# Patient Record
Sex: FEMALE | Race: WHITE | NOT HISPANIC OR LATINO | Employment: FULL TIME | URBAN - METROPOLITAN AREA
[De-identification: names, ages, dates, MRNs, and addresses within clinical notes are randomized per-mention and may not be internally consistent; named-entity substitution may affect disease eponyms.]

---

## 2017-01-05 ENCOUNTER — ALLSCRIPTS OFFICE VISIT (OUTPATIENT)
Dept: OTHER | Facility: OTHER | Age: 22
End: 2017-01-05

## 2017-01-06 ENCOUNTER — TRANSCRIBE ORDERS (OUTPATIENT)
Dept: ADMINISTRATIVE | Age: 22
End: 2017-01-06

## 2017-01-06 ENCOUNTER — APPOINTMENT (OUTPATIENT)
Dept: LAB | Age: 22
End: 2017-01-06
Attending: PREVENTIVE MEDICINE

## 2017-01-06 DIAGNOSIS — Z02.1 PRE-EMPLOYMENT DRUG SCREENING: Primary | ICD-10-CM

## 2017-01-06 DIAGNOSIS — Z02.1 PRE-EMPLOYMENT DRUG SCREENING: ICD-10-CM

## 2017-01-06 PROCEDURE — 36415 COLL VENOUS BLD VENIPUNCTURE: CPT

## 2017-01-06 PROCEDURE — 86803 HEPATITIS C AB TEST: CPT

## 2017-01-06 PROCEDURE — 86706 HEP B SURFACE ANTIBODY: CPT

## 2017-01-08 LAB
HBV SURFACE AB SER-ACNC: <3.1 MIU/ML
HCV AB SER QL: NORMAL

## 2017-01-12 ENCOUNTER — ALLSCRIPTS OFFICE VISIT (OUTPATIENT)
Dept: OTHER | Facility: OTHER | Age: 22
End: 2017-01-12

## 2017-02-02 ENCOUNTER — ALLSCRIPTS OFFICE VISIT (OUTPATIENT)
Dept: OTHER | Facility: OTHER | Age: 22
End: 2017-02-02

## 2017-02-02 DIAGNOSIS — N93.9 ABNORMAL UTERINE AND VAGINAL BLEEDING, UNSPECIFIED: ICD-10-CM

## 2017-02-02 LAB — HCG, QUALITATIVE (HISTORICAL): NEGATIVE

## 2017-03-21 ENCOUNTER — ALLSCRIPTS OFFICE VISIT (OUTPATIENT)
Dept: OTHER | Facility: OTHER | Age: 22
End: 2017-03-21

## 2017-04-03 ENCOUNTER — ALLSCRIPTS OFFICE VISIT (OUTPATIENT)
Dept: OTHER | Facility: OTHER | Age: 22
End: 2017-04-03

## 2017-04-03 DIAGNOSIS — N92.6 IRREGULAR MENSTRUATION: ICD-10-CM

## 2017-04-03 PROCEDURE — 87591 N.GONORRHOEAE DNA AMP PROB: CPT | Performed by: PHYSICIAN ASSISTANT

## 2017-04-03 PROCEDURE — 87491 CHLMYD TRACH DNA AMP PROBE: CPT | Performed by: PHYSICIAN ASSISTANT

## 2017-04-04 ENCOUNTER — LAB REQUISITION (OUTPATIENT)
Dept: LAB | Facility: HOSPITAL | Age: 22
End: 2017-04-04
Payer: COMMERCIAL

## 2017-04-04 DIAGNOSIS — Z11.3 ENCOUNTER FOR SCREENING FOR INFECTIONS WITH PREDOMINANTLY SEXUAL MODE OF TRANSMISSION: ICD-10-CM

## 2017-04-05 LAB
CHLAMYDIA DNA CVX QL NAA+PROBE: NORMAL
N GONORRHOEA DNA GENITAL QL NAA+PROBE: NORMAL

## 2017-07-31 ENCOUNTER — ALLSCRIPTS OFFICE VISIT (OUTPATIENT)
Dept: OTHER | Facility: OTHER | Age: 22
End: 2017-07-31

## 2017-07-31 DIAGNOSIS — N92.6 IRREGULAR MENSTRUATION: ICD-10-CM

## 2017-10-30 ENCOUNTER — ALLSCRIPTS OFFICE VISIT (OUTPATIENT)
Dept: OTHER | Facility: OTHER | Age: 22
End: 2017-10-30

## 2017-11-13 ENCOUNTER — ALLSCRIPTS OFFICE VISIT (OUTPATIENT)
Dept: OTHER | Facility: OTHER | Age: 22
End: 2017-11-13

## 2017-12-26 ENCOUNTER — GENERIC CONVERSION - ENCOUNTER (OUTPATIENT)
Dept: OTHER | Facility: OTHER | Age: 22
End: 2017-12-26

## 2017-12-27 ENCOUNTER — TRANSCRIBE ORDERS (OUTPATIENT)
Dept: LAB | Facility: CLINIC | Age: 22
End: 2017-12-27

## 2017-12-27 ENCOUNTER — ALLSCRIPTS OFFICE VISIT (OUTPATIENT)
Dept: OTHER | Facility: OTHER | Age: 22
End: 2017-12-27

## 2017-12-27 ENCOUNTER — APPOINTMENT (OUTPATIENT)
Dept: LAB | Facility: CLINIC | Age: 22
End: 2017-12-27
Payer: COMMERCIAL

## 2017-12-27 DIAGNOSIS — Z00.00 ROUTINE GENERAL MEDICAL EXAMINATION AT A HEALTH CARE FACILITY: ICD-10-CM

## 2017-12-27 DIAGNOSIS — F41.8 DEPRESSION WITH ANXIETY: Primary | ICD-10-CM

## 2017-12-27 PROCEDURE — 36415 COLL VENOUS BLD VENIPUNCTURE: CPT

## 2017-12-27 PROCEDURE — 86480 TB TEST CELL IMMUN MEASURE: CPT

## 2017-12-29 LAB
ANNOTATION COMMENT IMP: NORMAL
GAMMA INTERFERON BACKGROUND BLD IA-ACNC: 0.08 IU/ML
M TB IFN-G BLD-IMP: NEGATIVE
M TB IFN-G CD4+ BCKGRND COR BLD-ACNC: 0.01 IU/ML
M TB IFN-G CD4+ T-CELLS BLD-ACNC: 0.09 IU/ML
MITOGEN IGNF BLD-ACNC: 8.82 IU/ML
QUANTIFERON-TB GOLD IN TUBE: NORMAL
SERVICE CMNT-IMP: NORMAL

## 2018-01-12 VITALS
WEIGHT: 142 LBS | HEART RATE: 64 BPM | RESPIRATION RATE: 16 BRPM | HEIGHT: 65 IN | SYSTOLIC BLOOD PRESSURE: 112 MMHG | BODY MASS INDEX: 23.66 KG/M2 | DIASTOLIC BLOOD PRESSURE: 80 MMHG

## 2018-01-12 NOTE — PROGRESS NOTES
Assessment    1  Encounter for gynecological examination without abnormal finding (V72 31) (Z01 419)    Plan  Encounter for gynecological examination without abnormal finding    · Follow-up visit in 1 year Evaluation and Treatment  Follow-up  Status: Complete  Done:  30Oct2017   Ordered; For: Encounter for gynecological examination without abnormal finding; Ordered By: Rachael Núñez Performed:  Due: 79KVI0982; Last Updated By: Josh Abraham; 10/30/2017 1:39:35 PM  Irregular bleeding    · Lo Loestrin Fe 1 MG-10 MCG / 10 MCG Oral Tablet; One po daily   Rx By: Rachael Núñez; Dispense: 90 Days ; #:90 Tablet; Refill: 3; For: Irregular bleeding; DREW = N; Verified Transmission to [facility]/PHARMACY# [id number]; Last Updated By: System, [employer]; 10/30/2017 1:33:21 PM    Discussion/Summary  healthy adult female the risks and benefits of cervical cancer screening were discussed cervical cancer screening is not indicated Breast cancer screening: the risks and benefits of breast cancer screening were discussed and monthly self breast exam was advised  Advice and education were given regarding weight bearing exercise, calcium supplements and vitamin D supplements  Chief Complaint  Pt is here for her yearly exam/ pill check, she has no complaints  History of Present Illness  HPI: 23year old white female here for yearly exam and pill check  Back in July she was seen for irregular bleeding; her Nexplanon was removed and she was started on Lo Loestrin  She has had very regular, light bleeding since that time  She is having some difficulty remembering her pills on weekends and she is going to work on this  If she is still not doing well, we did discuss the options of NuvaRing or OrthoEvra patches  GYN HM, Adult Female [facility]: The patient is being seen for a health maintenance evaluation  The last health maintenance visit was 1 year(s) ago  General Health: The patient's health since the last visit is described as good  Lifestyle:  She consumes a diverse and healthy diet  She does not have any weight concerns  She exercises regularly  She exercises less than three times a week  She does not use tobacco  She consumes alcohol  She reports occasional alcohol use  She denies drug use  Reproductive health: the patient is premenopausal   she reports no menstrual problems  she uses contraception  For contraception, she uses oral contraception pills  she is sexually active  she denies prior pregnancies  Screening: Cervical cancer screening includes no previous pap smear and no previous human papilloma virus screening  Breast cancer screening includes no previous mammogram  Colorectal cancer screening includes no previous colonoscopy  Active Problems    1  Depression with anxiety (300 4) (F41 8)   2  Insomnia (780 52) (G47 00)   3   Irregular bleeding (626 4) (N92 6)    Past Medical History    · History of Allergic rhinitis due to pollen (477 0) (J30 1)   · History of Birth control counseling (V25 09) (Z30 09)   · History of Contraception management (V25 9) (Z30 9)   · History of Depression (311) (F32 9)   · History of Evaluation for contraceptive implant (V25 02) (Z30 017)   · History of Globus sensation (306 4) (F45 8)   · History of acute pharyngitis (V12 69) (Z87 09)   · History of allergic reaction (V15 09) (Z88 9)   · History of allergy (V15 09) (Z88 9)   · History of dysmenorrhea (V13 29) (Z87 42)   · History of epistaxis (V12 69) (Z29 341)   · History of influenza (V12 09) (Z87 09)   · History of low back pain (V13 59) (Z87 39)   · History of Insertion of Nexplanon (V25 5) (Z30 017)   · History of No Recent Change In Medical History   · History of Right wrist sprain (842 00) (S63 501A)   · History of Stretch marks (701 3) (L90 6)   · History of Weight gain (783 1) (R63 5)    Surgical History    · Denied: History Of Prior Surgery    Family History  Mother    · Family history of Anemia (V18 2)   · Family history of Idiopathic Thrombocytopenic Purpura  Father    · Family history of Alcohol Abuse  Maternal Grandfather    · Family history of Esophageal Cancer (V16 0)  Paternal Grandfather    · Family history of Multiple Sclerosis    Social History    · Caffeine Use   · Daily Coffee Consumption (2  Cups/Day)   · Never A Smoker   · Never Drank Alcohol   · Never Used Drugs    Current Meds   1  Claritin 10 MG Oral Capsule; Take one tablet daily; Therapy: 49SSY3120 to (Evaluate:31Fey7811)  Requested for: 28Apr2017; Last   Rx:28Apr2017 Ordered   2  Lo Loestrin Fe 1 MG-10 MCG / 10 MCG Oral Tablet; One po daily; Therapy: 56MCR9279 to (Gabby Million)  Requested for: 23Oct2017; Last   Rx:23Oct2017 Ordered    Allergies    1  No Known Drug Allergies    2  Pollen    Vitals   Recorded: 51HRO2263 02:46YG   Systolic 114, LUE, Sitting   Diastolic 82, LUE, Sitting   Height 5 ft 5 in   Weight 146 lb    BMI Calculated 24 3   BSA Calculated 1 73   LMP 13Oct2017     Physical Exam    Constitutional   General appearance: No acute distress, well appearing and well nourished  Neck   Neck: Normal, supple, trachea midline, no masses  Genitourinary   External genitalia: Normal and no lesions appreciated  Vagina: Normal, no lesions or dryness appreciated  Urethra: Normal     Urethral meatus: Normal     Bladder: Normal, soft, non-tender and no prolapse or masses appreciated  Cervix: Normal, no palpable masses  Uterus: Normal, non-tender, not enlarged, and no palpable masses  Adnexa/parametria: Normal, non-tender and no fullness or masses appreciated  Chest   Breasts: Normal and no dimpling or skin changes noted  Abdomen   Abdomen: Normal, non-tender, and no organomegaly noted         Signatures  Electronically signed by : Saurabh Godwin, [facility]; Oct 30 2017  1:31PM EST                       (Author)

## 2018-01-13 VITALS
SYSTOLIC BLOOD PRESSURE: 127 MMHG | BODY MASS INDEX: 26.66 KG/M2 | HEART RATE: 94 BPM | WEIGHT: 160 LBS | HEIGHT: 65 IN | DIASTOLIC BLOOD PRESSURE: 83 MMHG

## 2018-01-13 VITALS
DIASTOLIC BLOOD PRESSURE: 78 MMHG | HEIGHT: 65 IN | WEIGHT: 158 LBS | SYSTOLIC BLOOD PRESSURE: 122 MMHG | BODY MASS INDEX: 26.33 KG/M2

## 2018-01-13 VITALS
HEART RATE: 80 BPM | BODY MASS INDEX: 27.49 KG/M2 | RESPIRATION RATE: 18 BRPM | DIASTOLIC BLOOD PRESSURE: 70 MMHG | HEIGHT: 65 IN | SYSTOLIC BLOOD PRESSURE: 112 MMHG | WEIGHT: 165 LBS

## 2018-01-13 VITALS
WEIGHT: 159.5 LBS | SYSTOLIC BLOOD PRESSURE: 112 MMHG | RESPIRATION RATE: 16 BRPM | HEIGHT: 65 IN | DIASTOLIC BLOOD PRESSURE: 72 MMHG | HEART RATE: 68 BPM | BODY MASS INDEX: 26.57 KG/M2

## 2018-01-13 VITALS — BODY MASS INDEX: 26.09 KG/M2 | WEIGHT: 152 LBS | SYSTOLIC BLOOD PRESSURE: 120 MMHG | DIASTOLIC BLOOD PRESSURE: 78 MMHG

## 2018-01-14 VITALS
WEIGHT: 146 LBS | DIASTOLIC BLOOD PRESSURE: 82 MMHG | SYSTOLIC BLOOD PRESSURE: 140 MMHG | BODY MASS INDEX: 24.32 KG/M2 | HEIGHT: 65 IN

## 2018-01-14 NOTE — PROGRESS NOTES
Assessment    1  Depression with anxiety (300 4) (F41 8)    Plan  Depression with anxiety    · FLUoxetine HCl - 20 MG Oral Capsule (PROzac); TAKE 1 CAPSULE EVERY DAY   · Follow-up visit in 1 month Evaluation and Treatment  Follow-up  Status: Hold For -  Scheduling  Requested for: 51JKO1764    Chief Complaint  Patient here to discuss changing here Lexapro prescription      History of Present Illness  STOPPED LEXAPRO DUE TO FATIGUE   The patient is being seen for a routine clinic follow-up of depression  Symptoms:  depressed mood, crying spells, fatigue and loss of interest in activities, but no irritability, no poor concentration, no poor school performance, no school absenteeism, no guilt, no psychomotor retardation, no agitation, no anger, no appetite change, no weight loss, no insufficient weight gain, no weight gain, no poor sleep, no hypersomnia, no headaches, no parent-child conflict and no sibling conflict  Review of Systems    Constitutional: No complaints of fever or chills, feels well, no tiredness, no recent weight gain or loss  Eyes: No complaints of eye pain, no discharge, no eyesight problems, eyes do not itch, no red or dry eyes  ENT: no complaints of nasal discharge, no hoarseness, no earache, no nosebleeds, no loss of hearing, no sore throat  Cardiovascular: No complaints of chest pain, no palpitations, normal heart rate, no lower extremity edema  Respiratory: No complaints of cough, no shortness of breath, no wheezing, no leg claudication  Gastrointestinal: No complaints of abdominal pain, no nausea or vomiting, no constipation, no diarrhea or bloody stools  Genitourinary: No complaints of incontinence, no pelvic pain, no dysuria or dysmenorrhea, no abnormal vaginal bleeding or vaginal discharge  Musculoskeletal: No complaints of limb swelling or limb pain, no myalgias, no joint swelling or joint stiffness     Integumentary: No complaints of skin rash, no skin lesions or wounds, no itching, no breast pain, no breast lump  Neurological: No complaints of headache, no numbness or tingling, no confusion, no dizziness, no limb weakness, no convulsions or fainting, no difficulty walking  Psychiatric: as noted in HPI  Endocrine: No complaints of feeling weak, no muscle weakness, no deepening of voice, no hot flashes or proptosis  Hematologic/Lymphatic: No complaints of swollen glands, no neck swollen glands, does not bleed or bruise easily  Active Problems    1  Birth control counseling (V25 09) (Z30 9)   2  Contraception management (V25 9) (Z30 9)   3  Depression with anxiety (300 4) (F41 8)   4  Dysmenorrhea (625 3) (N94 6)   5  Encounter for contraceptive surveillance (V25 40) (Z30 40)   6  Encounter for management and injection of depo-Provera (V25 49) (Z30 49)   7  Evaluation for contraceptive implant (V25 02) (Z30 018)   8  History of allergy (V15 09) (Z88 9)   9  Need for HPV vaccination (V04 89) (Z23)   10  Need for influenza vaccination (V04 81) (Z23)   11  Stretch marks (701 3) (L90 6)    Past Medical History    1  History of Depression (311) (F32 9)   2  History of No Recent Change In Medical History    Surgical History    1  Denied: History Of Prior Surgery    Family History    1  Family history of Anemia (V18 2)   2  Family history of Idiopathic Thrombocytopenic Purpura    3  Family history of Alcohol Abuse    4  Family history of Esophageal Cancer (V16 0)    5  Family history of Multiple Sclerosis    Social History    · Caffeine Use   · Daily Coffee Consumption (2  Cups/Day)   · Never A Smoker   · Never Drank Alcohol   · Never Used Drugs    Current Meds   1  Escitalopram Oxalate 10 MG Oral Tablet; TAKE ONE TABLET BY MOUTH EVERY DAY; Therapy: 90GMP3637 to (Evaluate:34Lih7761)  Requested for: 01HMU3443; Last   Rx:16Nov2015 Ordered    Allergies    1  No Known Drug Allergies    2   Pollen    Vitals  Vital Signs [Data Includes: Current Encounter]    Recorded: 49LQU4395 11: 29AM   Heart Rate 80   Respiration 18   Systolic 869   Diastolic 70   Height 5 ft 5 in   2-20 Stature Percentile 61 %   Weight 156 lb    2-20 Weight Percentile 87 %   BMI Calculated 25 96   BMI Percentile 85 %   BSA Calculated 1 78     Physical Exam    Constitutional - General appearance: No acute distress, well appearing and well nourished  Pulmonary - Respiratory effort: Normal respiratory rate and rhythm, no increased work of breathing  Auscultation of lungs: Clear bilaterally  Cardiovascular - Pedal pulses: Normal, 2+ bilaterally  Examination of extremities for edema and/or varicosities: Normal    Abdomen - Abdomen: Normal bowel sounds, soft, non-tender, no masses  Liver and spleen: No hepatomegaly or splenomegaly     Psychiatric - Orientation to person, place, and time: Normal  Mood and affect: Normal       Signatures   Electronically signed by : Brianne Lan DO; Jan 22 2016 11:47AM EST                       (Author)

## 2018-01-19 ENCOUNTER — GENERIC CONVERSION - ENCOUNTER (OUTPATIENT)
Dept: OTHER | Facility: OTHER | Age: 23
End: 2018-01-19

## 2018-01-22 VITALS
HEART RATE: 68 BPM | SYSTOLIC BLOOD PRESSURE: 118 MMHG | RESPIRATION RATE: 16 BRPM | BODY MASS INDEX: 26.37 KG/M2 | HEIGHT: 65 IN | DIASTOLIC BLOOD PRESSURE: 70 MMHG | WEIGHT: 158.25 LBS

## 2018-01-23 VITALS
DIASTOLIC BLOOD PRESSURE: 70 MMHG | BODY MASS INDEX: 23.06 KG/M2 | RESPIRATION RATE: 16 BRPM | SYSTOLIC BLOOD PRESSURE: 100 MMHG | HEIGHT: 65 IN | HEART RATE: 64 BPM | WEIGHT: 138.38 LBS

## 2018-01-23 NOTE — MISCELLANEOUS
Message   Recorded as Task   Date: 12/20/2017 12:57 PM, Created By: Sandy Ravi   Task Name: Call Back   Assigned To: Vanessa Martin   Regarding Patient: Emely Badillo, Status: In Progress   Comment:    Lorena Hoff - 20 Dec 2017 12:57 PM     TASK CREATED  PT CALLED C/O SPOTTING X 1 WK  STARTED AS A NORMAL PERIOD ABOUT A WEEK AFTER HER REGULAR PERIOPD  STILL GOING ON  MISSED 2 PILLS  SHE DID START A NEW MEDICATION- ESCITALOPRAM 10MG 1 QD  NO NEW PARTNERS  [facility] Dec 2017 1:19 PM     TASK IN PROGRESS   Jacquie Thomas - 20 Dec 2017 1:19 PM     TASK EDITED  [facility] for pt to cb       ext: [id number]   Jacquie Thomas - 21 Dec 2017 9:39 AM     TASK EDITED  [facility] for pt to cb     ext: [id number]   Jacquie Thomas - 22 Dec 2017 11:09 AM     TASK EDITED  [facility] for pt to cb       ext: [place] [id number] 26 Dec 2017 7:30 AM     TASK EDITED  [facility] for pt to cb   4th call       ext: [phone]   Jacquie Thomas - 26 Dec 2017 9:51 AM     TASK EDITED  Patient returned call - she was on the Nexplanon for about a year - switched out to pill form - is now on the Lo Loestrin - states she has been on this for 6 months - currently in 6th pack  She had some breakthrough bleeding when she first started using it, but got regulated  This current pack - she had break through bleeding in 2nd week of pack - was spotting  No pain  She is due for her period next week  Did miss 2 pills when starting this current pack - just doubled up on it - has not missed any since  She has recently been put on a new medication Escitalopram 10mg  Patient wants to know if this could be the cause of the irregular bleeding  Please advise  Thanks! Bebe Cavazos - 26 Dec 2017 10:07 AM     TASK REPLIED TO: Previously Assigned To Bebe Cavazos  Escitalopram should not be causing irregular bleeding    If she missed two pills/took them late this pack, this would explain her irregular bleeding   I would observe next month and call if it is persistent    If she is having trouble remembering pills maybe should consider switch to patches/ring   Jacquie Thomas - 26 Dec 2017 10:07 AM     TASK IN PROGRESS   Jacquie Thomas - 26 Dec 2017 10:09 AM     TASK EDITED  Called pt back - explained to her that the Escitalopram should not counteract the OCP per W87  Advised her per W87 to observe next month - if it happens again to call back  Active Problems    1  Depression with anxiety (300 4) (F41 8)   2  Encounter for gynecological examination without abnormal finding (V72 31) (Z01 419)   3  Insomnia (780 52) (G47 00)   4  Irregular bleeding (626 4) (N92 6)    Current Meds   1  Claritin 10 MG Oral Capsule; Take one tablet daily; Therapy: 61MQD4620 to (Evaluate:72Uxx2042)  Requested for: 28Apr2017; Last   Rx:28Apr2017 Ordered   2  Escitalopram Oxalate 10 MG Oral Tablet; TAKE 1 TABLET DAILY; Therapy: 20OFO1976 to (Evaluate:39Lam7928)  Requested for: 20PWY1397; Last   Rx:13Nov2017 Ordered   3  Lo Loestrin Fe 1 MG-10 MCG / 10 MCG Oral Tablet; One po daily; Therapy: 71AOR4133 to ([phone])  Requested for: 29VZH3556; Last   Rx:94Ibd7019 Ordered    Allergies    1  No Known Drug Allergies    2   Pollen    Signatures   Electronically signed by : Lilian Glez, ; Dec 26 2017 10:10AM EST                       (Author)

## 2018-01-23 NOTE — RESULT NOTES
Verified Results  (1) QUANTIFERON - TB GOLD 25Ssu1980 11:22AM [address], [address]     Test Name Result Flag Reference   QUANTIFERON TB GOLD      Specimen incubated at [place], [place]    Performed at:  [address] [place] [address]  [id number]  : Dank Gonzales MD, Phone:  [phone]   QUANTIFERON TB GOLD Negative  Negative   QUANTIFERON CRITERIA Comment     To be considered positive a specimen should have a TB Ag minus Nil  value greater than or equal to 0 35 IU/mL and in addition the TB Ag  minus Nil value must be greater than or equal to 25% of the Nil  value  There may be insufficient information in these values to  differentiate between some negative and some indeterminate test  values  QUANTIFERON TB AG VALUE 0 09 IU/mL     QUANTIFERON NIL VALUE 0 08 IU/mL     QUANTIFERON MITOGEN VALUE 8 82 IU/mL     QFT TB AG MINUS NIL VALUE 0 01 IU/mL     QFT INTERPRETATION Comment     The QuantiFERON TB Gold (in Tube) assay is intended for use as an aid  in the diagnosis of TB infection  Negative results suggest that there  is no TB infection  In patients with high suspicion of exposure, a  negative test should be repeated  A positive test indicates infection  with Mycobacterium tuberculosis  Among individuals without  tuberculosis infection, a positive test may be due to exposure to  [place], MICHELE boggs or M  marieugenioum  On the Internet, go to  cdc gov/tb for further details    Performed at:  [employer]  [id number]  : Dank Gonzales MD, Phone:  [phone]

## 2018-01-23 NOTE — PROGRESS NOTES
Assessment   1  Encounter for preventive health examination (V70 0) (Z00 00)  2  Depression with anxiety (300 4) (F41 8)    Plan  Depression with anxiety, Health Maintenance    · (1) QUANTIFERON - TB GOLD; Status:Active; Requested for:59Flt6637;    · Follow-up visit in 3 months Evaluation and Treatment  Follow-up  Status: Complete   Done: 71VVK1216  Need for influenza vaccination    · Administered: Fluzone Quadrivalent 0 5 ML Intramuscular Suspension Prefilled Syringe    Discussion/Summary  health maintenance visit healthy adult female Currently, she eats a healthy diet and has an adequate exercise regimen  the risks and benefits of cervical cancer screening were discussed next cervical cancer screening is due Next year  cervical cancer screening is managed by OB/GYN  Breast cancer screening: the risks and benefits of breast cancer screening were discussed, monthly self breast exam was advised and breast cancer screening is managed by OB/GYN  Colorectal cancer screening: colorectal cancer screening is not indicated  Osteoporosis screening: bone mineral density testing is not indicated  Screening lab work includes [facility]  The risks and benefits of immunizations were discussed ,  immunizations are needed2  and Flu Vaccine and Adacel 2  given IM today, and tolerated well  2   Advice and education were given regarding nutrition, aerobic exercise and  Mood is well controlled on present management at this time  1   Patient discussion: discussed with the patient, [place] is stable on exam  She is to f/u in 3 months, or sooner PRN  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  The patient was counseled regarding instructions for management, impressions, importance of compliance with treatment  The treatment plan was reviewed with the patient/guardian  The patient/guardian understands and agrees with the treatment plan       1 Amended By: Liliana Jalloh;  Dec 28 2017 8:02 AM EST   2 Amended By: Gayle Rudolph; Dec 28 2017 8:03 AM EST    Chief Complaint  PT is being seen today for a HM visit  History of Present Illness  HM, Adult Female: The patient is being seen for a health maintenance evaluation  The last health maintenance visit was 1 year(s) ago  General Health: The patient's health since the last visit is described as good   We discussed - PAP smear due next year  She has regular dental visits  She denies vision problems  She denies hearing loss  Immunizations status: not up to date   Adacel  Lifestyle:  She consumes a diverse and healthy diet  She does not have any weight concerns  She exercises regularly  She does not use tobacco  She denies alcohol use  She denies drug use  Reproductive health:  she reports normal menses  she is sexually active  Screening: cancer screening reviewed and current  metabolic screening reviewed and current  risk screening reviewed and current  Additional History:  No CP/SOB  No abd pain , No new breast lumps  Menses regular  Mood well controlled on present management  No HI/SI  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  HPI: As above  Review of Systems    Constitutional: as noted in HPI  Eyes: as noted in HPI    ENT: as noted in HPI  Cardiovascular: as noted in HPI  Respiratory: as noted in HPI  Gastrointestinal: as noted in HPI  Genitourinary: as noted in HPI  Psychiatric: as noted in HPI  Active Problems   1  Depression with anxiety (300 4) (F41 8)  2  Encounter for gynecological examination without abnormal finding (V72 31) (Z01 419)  3  Insomnia (780 52) (G47 00)  4  Irregular bleeding (626 4) (N92 6)  5   Need for influenza vaccination (V04 81) (Z23)    Past Medical History    · History of Allergic rhinitis due to pollen (477 0) (J30 1)   · History of Birth control counseling (V25 09) (Z30 09)   · History of Contraception management (V25 9) (Z30 9)   · History of Depression (311) (F32 9)   · History of Evaluation for contraceptive implant (V25 02) (Z30 017)   · History of Globus sensation (306 4) (F45 8)   · History of acute pharyngitis (V12 69) (Z87 09)   · History of allergic reaction (V15 09) (Z88 9)   · History of allergy (V15 09) (Z88 9)   · History of dysmenorrhea (V13 29) (Z87 42)   · History of epistaxis (V12 69) (J25 971)   · History of influenza (V12 09) (Z87 09)   · History of low back pain (V13 59) (Z87 39)   · History of Insertion of Nexplanon (V25 5) (Z30 017)   · History of No Recent Change In Medical History   · History of Right wrist sprain (842 00) (S63 501A)   · History of Stretch marks (701 3) (L90 6)   · History of Weight gain (783 1) (R63 5)    Surgical History    · Denied: History Of Prior Surgery    Family History  Mother    · Family history of Anemia (V18 2)   · Family history of Idiopathic Thrombocytopenic Purpura  Father    · Family history of Alcohol Abuse  Maternal Grandfather    · Family history of Esophageal Cancer (V16 0)  Paternal Grandfather    · Family history of Multiple Sclerosis    Social History    · Caffeine Use   · Daily Coffee Consumption (2  Cups/Day)   · Never A Smoker   · Never Drank Alcohol   · Never Used Drugs    Current Meds  1  Claritin 10 MG Oral Capsule; Take one tablet daily; Therapy: 23GFX2176 to (Evaluate:67Yvb3167)  Requested for: 80Rjy4230; Last   Rx:28Apr2017 Ordered  2  Escitalopram Oxalate 10 MG Oral Tablet; TAKE 1 TABLET DAILY; Therapy: 12AKY1769 to (Evaluate:24Ppv2652)  Requested for: 09XXB5668; Last   Rx:77Tfp3285 Ordered  3  Lo Loestrin Fe 1 MG-10 MCG / 10 MCG Oral Tablet; One po daily; Therapy: 27NZY8627 to (Katherine Starr)  Requested for: 45WJO1566; Last   Rx:85Cwh8362 Ordered    Allergies   1  No Known Drug Allergies   2   Pollen    Vitals   Recorded: 08FBR4911 10:46AM   Heart Rate 64   Respiration 16   Systolic 446   Diastolic 70   Height 5 ft 5 47 in   Weight 138 lb 6 oz   BMI Calculated 22 7   BSA Calculated 1 7     Physical Exam    Constitutional General appearance: No acute distress, well appearing and well nourished  NAD; VSS; pleasant  Head and Face   Head and face: Normal     Eyes   Conjunctiva and lids: No swelling, erythema or discharge  Ears, Nose, Mouth, and Throat   External inspection of ears and nose: Normal     Otoscopic examination: Tympanic membranes translucent with normal light reflex  Canals patent without erythema  Hearing: Normal     Lips, teeth, and gums: Normal, good dentition  Oropharynx: Normal with no erythema, edema, exudate or lesions  Neck   Neck: Supple, symmetric, trachea midline, no masses  Pulmonary   Respiratory effort: No increased work of breathing or signs of respiratory distress  Auscultation of lungs: Clear to auscultation  Cardiovascular   Auscultation of heart: Normal rate and rhythm, normal S1 and S2, no murmurs  Abdomen   Abdomen: Non-tender, no masses  Liver and spleen: No hepatomegaly or splenomegaly  Lymphatic   Palpation of lymph nodes in neck: No lymphadenopathy  Musculoskeletal   Gait and station: Normal   No scoliosis  Psychiatric   Judgment and insight: Normal     Orientation to person, place, and time: Normal     Recent and remote memory: Intact      Mood and affect: Normal        Future Appointments    Date/Time Provider Specialty Site   01/08/2018 06:15 PM Isacc Serrano DO Family Medicine [address] [facility]   03/27/2018 04:45 PM Yury Bright [address]     Signatures   Electronically signed by : Chelsie Duff DO; Dec 28 2017  8:03AM EST                       (Author)
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
## 2018-02-06 ENCOUNTER — TELEPHONE (OUTPATIENT)
Dept: FAMILY MEDICINE CLINIC | Facility: CLINIC | Age: 23
End: 2018-02-06

## 2018-02-12 ENCOUNTER — OFFICE VISIT (OUTPATIENT)
Dept: FAMILY MEDICINE CLINIC | Facility: CLINIC | Age: 23
End: 2018-02-12
Payer: COMMERCIAL

## 2018-02-12 VITALS
HEART RATE: 84 BPM | RESPIRATION RATE: 16 BRPM | BODY MASS INDEX: 22.76 KG/M2 | SYSTOLIC BLOOD PRESSURE: 110 MMHG | WEIGHT: 141.6 LBS | TEMPERATURE: 98.1 F | HEIGHT: 66 IN | DIASTOLIC BLOOD PRESSURE: 68 MMHG

## 2018-02-12 DIAGNOSIS — J31.2 SORE THROAT, CHRONIC: Primary | ICD-10-CM

## 2018-02-12 PROBLEM — N92.6 IRREGULAR BLEEDING: Status: ACTIVE | Noted: 2017-04-03

## 2018-02-12 PROBLEM — T75.3XXA MOTION SICKNESS: Status: ACTIVE | Noted: 2017-12-27

## 2018-02-12 PROCEDURE — 3008F BODY MASS INDEX DOCD: CPT | Performed by: NURSE PRACTITIONER

## 2018-02-12 PROCEDURE — 99213 OFFICE O/P EST LOW 20 MIN: CPT | Performed by: NURSE PRACTITIONER

## 2018-02-12 RX ORDER — LORATADINE 10 MG/1
1 CAPSULE, LIQUID FILLED ORAL DAILY
COMMUNITY
Start: 2016-05-16 | End: 2018-04-23 | Stop reason: SDUPTHER

## 2018-02-12 RX ORDER — ESCITALOPRAM OXALATE 10 MG/1
1 TABLET ORAL DAILY
COMMUNITY
Start: 2017-11-13 | End: 2018-02-22 | Stop reason: DRUGHIGH

## 2018-02-12 NOTE — PROGRESS NOTES
Assessment/Plan:    No problem-specific Assessment & Plan notes found for this encounter  Problem List Items Addressed This Visit     None      Visit Diagnoses     Sore throat, chronic    -  Primary    Relevant Orders    Ambulatory Referral to Otolaryngology            Subjective:      Patient ID: Esdras Acosta is a 23 y o  female  Here for c/o sore throat on and off for one year  Can get white spots on tonsils  Thought tonsil stones, no halitosis  No gerd symptoms  Had white area she took picture of, not there currently  Then thought pnd          Sore Throat    This is a chronic problem  The current episode started more than 1 year ago  The problem has been unchanged  Neither side of throat is experiencing more pain than the other  There has been no fever  The pain is at a severity of 3/10  The pain is mild  Pertinent negatives include no abdominal pain, congestion, coughing, diarrhea, drooling, ear discharge, ear pain, headaches, hoarse voice, plugged ear sensation, neck pain, shortness of breath, swollen glands, trouble swallowing or vomiting  She has had no exposure to strep or mono  She has tried nothing for the symptoms  The following portions of the patient's history were reviewed and updated as appropriate: allergies, current medications, past family history, past medical history, past social history, past surgical history and problem list     Review of Systems   Constitutional: Negative for activity change, appetite change, fatigue and fever  HENT: Positive for sore throat  Negative for congestion, drooling, ear discharge, ear pain, hoarse voice, mouth sores and trouble swallowing  Respiratory: Negative for cough and shortness of breath  Cardiovascular: Negative for chest pain and palpitations  Gastrointestinal: Negative for abdominal pain, diarrhea and vomiting  Musculoskeletal: Negative for neck pain  Skin: Negative for rash  Neurological: Negative for headaches  Psychiatric/Behavioral: Negative for behavioral problems  The patient is not nervous/anxious  Family History   Problem Relation Age of Onset    Other Mother      IDIOPATHIC THROMBOCYTOPENIC PURPURA    Alcohol abuse Father     Anemia Father     Esophageal cancer Maternal Grandfather     Multiple sclerosis Paternal Grandfather      Past Medical History:   Diagnosis Date    Allergic rhinitis due to pollen     LAST ASSESSED [id number]    Depression     Dysmenorrhea     LAST ASSESSED [id number]    Globus sensation     LAST ASSESSED [id number]    Wrist sprain     LAST ASSESSED [id number]     Social History     Social History    Marital status: Single     Spouse name: N/A    Number of children: N/A    Years of education: N/A     Occupational History    Not on file  Social History Main Topics    Smoking status: Never Smoker    Smokeless tobacco: Never Used    Alcohol use No    Drug use: No    Sexual activity: Yes     Partners: Male     Birth control/ protection: Pill      Comment: Lo loestrin     Other Topics Concern    Not on file     Social History Narrative    CAFFEINE USE    DAILY COFFEE           Current Outpatient Prescriptions:     escitalopram (LEXAPRO) 10 mg tablet, Take 1 tablet by mouth daily, Disp: , Rfl:     Loratadine (CLARITIN) 10 MG CAPS, Take 1 tablet by mouth daily, Disp: , Rfl:     Norethin-Eth Estrad-Fe Biphas (LO LOESTRIN FE) 1 MG-10 MCG / 10 MCG TABS, Take by mouth daily, Disp: , Rfl:   Allergies   Allergen Reactions    Pollen Extract      Vitals:    02/12/18 1416   BP: 110/68   Pulse: 84   Resp: 16   Temp: 98 1 °F (36 7 °C)   Weight: 64 2 kg (141 lb 9 6 oz)   Height: 5' 5 98" (1 676 m)     Objective:    Vitals:    02/12/18 1416   BP: 110/68   Pulse: 84   Resp: 16   Temp: 98 1 °F (36 7 °C)        Physical Exam   Constitutional: She is oriented to person, place, and time  She appears well-developed and well-nourished  HENT:   Head: Normocephalic and atraumatic     Right Ear: External ear normal    Left Ear: External ear normal    Mouth/Throat: Oropharynx is clear and moist    Eyes: Conjunctivae are normal    Neck: Normal range of motion  Neck supple  No thyromegaly present  Cardiovascular: Normal rate, regular rhythm, normal heart sounds and intact distal pulses  Pulmonary/Chest: Effort normal and breath sounds normal    Abdominal: Soft  Bowel sounds are normal    Musculoskeletal: Normal range of motion  Neurological: She is alert and oriented to person, place, and time  Skin: Skin is warm and dry  Psychiatric: She has a normal mood and affect  Her behavior is normal  Judgment and thought content normal    Nursing note and vitals reviewed

## 2018-02-19 ENCOUNTER — TELEPHONE (OUTPATIENT)
Dept: FAMILY MEDICINE CLINIC | Facility: CLINIC | Age: 23
End: 2018-02-19

## 2018-02-19 DIAGNOSIS — F41.9 ANXIETY: Primary | ICD-10-CM

## 2018-02-19 RX ORDER — LORAZEPAM 0.5 MG/1
0.5 TABLET ORAL EVERY 8 HOURS PRN
Qty: 30 TABLET | Refills: 1 | OUTPATIENT
Start: 2018-02-19 | End: 2018-12-07 | Stop reason: ALTCHOICE

## 2018-02-19 NOTE — TELEPHONE ENCOUNTER
Spoke to pt regarding Rx sent to pharmacy  Will call back to make appt this week with Dr Mateo Rivera

## 2018-02-19 NOTE — TELEPHONE ENCOUNTER
I will give a small script for PRN Lorazepam to calm her down  Cannot be mixed with alcohol, and she cannot take if pregnant  Causes sedation  Let's get her in in the next 1 - 2 days in f/u  Call if worsening symptoms  Not sure if this is related to the Escitalopram, simple panic attacks, etc     Thanks      Dr Steve Casillas

## 2018-02-22 ENCOUNTER — OFFICE VISIT (OUTPATIENT)
Dept: FAMILY MEDICINE CLINIC | Facility: CLINIC | Age: 23
End: 2018-02-22
Payer: COMMERCIAL

## 2018-02-22 VITALS
WEIGHT: 138.4 LBS | HEART RATE: 64 BPM | SYSTOLIC BLOOD PRESSURE: 108 MMHG | DIASTOLIC BLOOD PRESSURE: 78 MMHG | BODY MASS INDEX: 22.35 KG/M2

## 2018-02-22 DIAGNOSIS — Z77.21 HISTORY OF POTENTIALLY HAZARDOUS BODY FLUID EXPOSURE: ICD-10-CM

## 2018-02-22 DIAGNOSIS — F41.8 DEPRESSION WITH ANXIETY: Primary | Chronic | ICD-10-CM

## 2018-02-22 PROCEDURE — 99213 OFFICE O/P EST LOW 20 MIN: CPT | Performed by: FAMILY MEDICINE

## 2018-02-22 RX ORDER — ESCITALOPRAM OXALATE 20 MG/1
20 TABLET ORAL DAILY
Qty: 30 TABLET | Refills: 3 | Status: SHIPPED | OUTPATIENT
Start: 2018-02-22 | End: 2018-04-23 | Stop reason: SDUPTHER

## 2018-02-22 NOTE — PROGRESS NOTES
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 - labwork ordered  Orders:  -     Hepatitis B Immunity Panel; Future          Subjective:      Patient ID: Zuleika Trejo is a 23 y o  female  Had been doing much better on the Escitalopram   Able to be at home by herself in recent times, without being anxious  No issues with the meds  No HI/SI  Did though recently have increased stress - family stressors, 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, struggling), etc   Having right now due to the stressors more bad days than good  PRN Lorazepam has helped  She is thinking still about counseling          The following portions of the patient's history were reviewed and updated as appropriate: allergies, current medications, past family history, past social history, past surgical history and problem list     Past Medical History:   Diagnosis Date    Allergic rhinitis due to pollen     LAST ASSESSED [id number]    Depression     Dysmenorrhea     LAST ASSESSED [id number]    Globus sensation     LAST ASSESSED [id number]    Wrist sprain     LAST ASSESSED [id number]       Current Outpatient Prescriptions:     Loratadine (CLARITIN) 10 MG CAPS, Take 1 tablet by mouth daily, Disp: , Rfl:     LORazepam (ATIVAN) 0 5 mg tablet, Take 1 tablet (0 5 mg total) by mouth every 8 (eight) hours as needed for anxiety for up to 10 days, Disp: 30 tablet, Rfl: 1    Norethin-Eth Estrad-Fe Biphas (LO LOESTRIN FE) 1 MG-10 MCG / 10 MCG TABS, Take by mouth daily, Disp: , Rfl:     escitalopram (LEXAPRO) 20 mg tablet, Take 1 tablet (20 mg total) by mouth daily for 30 days, Disp: 30 tablet, Rfl: 3    Allergies   Allergen Reactions    Pollen Extract          Review of Systems   Constitutional: Negative for activity change  Psychiatric/Behavioral: Negative for dysphoric mood and suicidal ideas  The patient is nervous/anxious  Objective:      /78 (BP Location: Right arm, Patient Position: Sitting, Cuff Size: Standard)   Pulse 64   Wt 62 8 kg (138 lb 6 4 oz)   BMI 22 35 kg/m²          Physical Exam   Constitutional: She is oriented to person, place, and time  She appears well-developed and well-nourished  No distress  HENT:   Head: Normocephalic and atraumatic  Neurological: She is alert and oriented to person, place, and time  Skin: She is not diaphoretic  Psychiatric: She has a normal mood and affect  Her behavior is normal  Judgment and thought content normal    Nursing note and vitals reviewed

## 2018-03-01 ENCOUNTER — APPOINTMENT (OUTPATIENT)
Dept: LAB | Age: 23
End: 2018-03-01
Payer: COMMERCIAL

## 2018-03-01 ENCOUNTER — TRANSCRIBE ORDERS (OUTPATIENT)
Dept: ADMINISTRATIVE | Age: 23
End: 2018-03-01

## 2018-03-01 DIAGNOSIS — Z77.21 HISTORY OF POTENTIALLY HAZARDOUS BODY FLUID EXPOSURE: ICD-10-CM

## 2018-03-01 DIAGNOSIS — Z77.21 PERSONAL HISTORY OF EXPOSURE TO POTENTIALLY HAZARDOUS BODY FLUIDS: Primary | ICD-10-CM

## 2018-03-01 DIAGNOSIS — Z77.21 PERSONAL HISTORY OF EXPOSURE TO POTENTIALLY HAZARDOUS BODY FLUIDS: ICD-10-CM

## 2018-03-01 PROCEDURE — 86706 HEP B SURFACE ANTIBODY: CPT

## 2018-03-01 PROCEDURE — 86704 HEP B CORE ANTIBODY TOTAL: CPT

## 2018-03-01 PROCEDURE — 36415 COLL VENOUS BLD VENIPUNCTURE: CPT

## 2018-03-02 LAB
HBV CORE AB SER QL: NORMAL
HBV SURFACE AB SER-ACNC: <3.1 MIU/ML

## 2018-03-15 DIAGNOSIS — Z23 IMMUNIZATION DUE: Primary | ICD-10-CM

## 2018-04-16 ENCOUNTER — OFFICE VISIT (OUTPATIENT)
Dept: OBGYN CLINIC | Facility: MEDICAL CENTER | Age: 23
End: 2018-04-16
Payer: COMMERCIAL

## 2018-04-16 VITALS — DIASTOLIC BLOOD PRESSURE: 80 MMHG | BODY MASS INDEX: 23.74 KG/M2 | SYSTOLIC BLOOD PRESSURE: 120 MMHG | WEIGHT: 147 LBS

## 2018-04-16 DIAGNOSIS — N92.6 IRREGULAR BLEEDING: ICD-10-CM

## 2018-04-16 PROCEDURE — 87591 N.GONORRHOEAE DNA AMP PROB: CPT | Performed by: PHYSICIAN ASSISTANT

## 2018-04-16 PROCEDURE — 87491 CHLMYD TRACH DNA AMP PROBE: CPT | Performed by: PHYSICIAN ASSISTANT

## 2018-04-16 PROCEDURE — 99213 OFFICE O/P EST LOW 20 MIN: CPT | Performed by: PHYSICIAN ASSISTANT

## 2018-04-16 RX ORDER — ESCITALOPRAM OXALATE 10 MG/1
10 TABLET ORAL DAILY
Refills: 5 | COMMUNITY
Start: 2018-03-17 | End: 2018-12-07 | Stop reason: ALTCHOICE

## 2018-04-16 RX ORDER — OMEPRAZOLE 40 MG/1
40 CAPSULE, DELAYED RELEASE ORAL DAILY
Refills: 2 | COMMUNITY
Start: 2018-03-30 | End: 2018-12-07 | Stop reason: ALTCHOICE

## 2018-04-16 NOTE — PROGRESS NOTES
[address] [facility]  1995    S:23year old white female here for irregular bleeding  We removed her Nexplanon back in July and she was placed on Lo Loestrin  She is having irregular bleeding with this - she is taking her pills every day on time  She can have a bleed during her placebo pills or 2 weeks into a pack  Her bleed will last 7 days and is very light  She is frustrated with this  She is sexually active, no new partner  Social History     Social History    Marital status: Single     Spouse name: N/A    Number of children: N/A    Years of education: N/A     Social History Main Topics    Smoking status: Never Smoker    Smokeless tobacco: Never Used    Alcohol use No    Drug use: No    Sexual activity: Yes     Partners: Male     Birth control/ protection: Pill      Comment: Lo loestrin     Other Topics Concern    None     Social History Narrative    CAFFEINE USE    DAILY COFFEE         Family History   Problem Relation Age of Onset    Other Mother      IDIOPATHIC THROMBOCYTOPENIC PURPURA    Alcohol abuse Father     Anemia Father     Esophageal cancer Maternal Grandfather     Bone cancer Maternal Grandfather     Multiple sclerosis Paternal Grandfather      O:  She is in no distress, appears well    A/P:  Irregular bleeding  Check Gc/chlamydia, TSH, free T4  Switch to [place], call in 2 months with status update

## 2018-04-18 LAB
CHLAMYDIA DNA CVX QL NAA+PROBE: NORMAL
N GONORRHOEA DNA GENITAL QL NAA+PROBE: NORMAL

## 2018-04-19 ENCOUNTER — OFFICE VISIT (OUTPATIENT)
Dept: FAMILY MEDICINE CLINIC | Facility: CLINIC | Age: 23
End: 2018-04-19
Payer: COMMERCIAL

## 2018-04-19 VITALS
DIASTOLIC BLOOD PRESSURE: 70 MMHG | HEART RATE: 68 BPM | SYSTOLIC BLOOD PRESSURE: 120 MMHG | WEIGHT: 147.8 LBS | BODY MASS INDEX: 23.87 KG/M2 | RESPIRATION RATE: 12 BRPM

## 2018-04-19 DIAGNOSIS — F41.8 DEPRESSION WITH ANXIETY: Primary | Chronic | ICD-10-CM

## 2018-04-19 PROCEDURE — 99213 OFFICE O/P EST LOW 20 MIN: CPT | Performed by: FAMILY MEDICINE

## 2018-04-19 RX ORDER — BUPROPION HYDROCHLORIDE 75 MG/1
75 TABLET ORAL 2 TIMES DAILY
Qty: 60 TABLET | Refills: 2 | Status: SHIPPED | OUTPATIENT
Start: 2018-04-19 | End: 2018-06-12 | Stop reason: SDUPTHER
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
## 2018-04-19 NOTE — ASSESSMENT & PLAN NOTE
Robert is struggling right now with her multiple stressors  She is to f/u here in 2 months, or sooner PRN  We discussed numerous options for her treatment (psychiatry consult, addition of Abilify, addition of Wellbutrin, potential risks / side effects of any added meds, etc)  For now, she will continue the Escitalopram at 20mg Qd, and add Bupropion / Wellbutrin  She was also referred to Mr Dona Armstrong LCSW, of Behavioral Health, to begin counseling

## 2018-04-23 DIAGNOSIS — F41.8 DEPRESSION WITH ANXIETY: Chronic | ICD-10-CM

## 2018-04-23 DIAGNOSIS — J30.1 ALLERGIC RHINITIS DUE TO POLLEN, UNSPECIFIED SEASONALITY: Primary | ICD-10-CM

## 2018-04-23 RX ORDER — LORATADINE 10 MG/1
10 CAPSULE, LIQUID FILLED ORAL DAILY
Qty: 90 EACH | Refills: 3 | Status: SHIPPED | OUTPATIENT
Start: 2018-04-23 | End: 2019-09-09 | Stop reason: ALTCHOICE

## 2018-04-23 RX ORDER — ESCITALOPRAM OXALATE 20 MG/1
20 TABLET ORAL DAILY
Qty: 90 TABLET | Refills: 1 | Status: SHIPPED | OUTPATIENT
Start: 2018-04-23 | End: 2018-11-02 | Stop reason: SDUPTHER

## 2018-05-01 ENCOUNTER — OFFICE VISIT (OUTPATIENT)
Dept: BEHAVIORAL/MENTAL HEALTH CLINIC | Facility: CLINIC | Age: 23
End: 2018-05-01
Payer: COMMERCIAL

## 2018-05-01 DIAGNOSIS — F41.8 DEPRESSION WITH ANXIETY: Primary | ICD-10-CM

## 2018-05-01 PROCEDURE — 90834 PSYTX W PT 45 MINUTES: CPT | Performed by: SOCIAL WORKER

## 2018-05-02 NOTE — PATIENT INSTRUCTIONS
Provided supportive therapy  Reviewed coping strategies for depression and anxiety  Reviewed appropriate boundaries, expectations and communication within health relationships  Lacking in relationship with  Fiance  Recommended couples counseling moving forward  Offered referral for this  Will assess her status in one week

## 2018-05-02 NOTE — PSYCH
Assessment/Plan:      There are no diagnoses linked to this encounter  Subjective:     Patient ID: Taya Rodriguez is a 23 y o  female  [facility] continues to struggle with depression and anxiety  Has long history of family dysfunction  However, stress in her relationship with her fiance has become primary stressor  Her mood issues have made this difficult to manage  Has hx of SI  Denies at present  Tearful at times during session  She is verbal, cooperative and well oriented  Review of Systems   Psychiatric/Behavioral: Positive for dysphoric mood  The patient is nervous/anxious  Objective:     Physical Exam   Psychiatric: Her speech is normal and behavior is normal  Judgment and thought content normal  Her mood appears anxious  Cognition and memory are normal  She exhibits a depressed mood  Denies any SI  Has limited supports given her family dysfunction

## 2018-06-12 DIAGNOSIS — F41.8 DEPRESSION WITH ANXIETY: Chronic | ICD-10-CM

## 2018-06-12 RX ORDER — BUPROPION HYDROCHLORIDE 75 MG/1
75 TABLET ORAL 2 TIMES DAILY
Qty: 180 TABLET | Refills: 1 | Status: SHIPPED | OUTPATIENT
Start: 2018-06-12 | End: 2018-12-08 | Stop reason: ALTCHOICE

## 2018-11-02 DIAGNOSIS — F41.8 DEPRESSION WITH ANXIETY: Chronic | ICD-10-CM

## 2018-11-02 RX ORDER — ESCITALOPRAM OXALATE 20 MG/1
20 TABLET ORAL DAILY
Qty: 90 TABLET | Refills: 1 | Status: SHIPPED | OUTPATIENT
Start: 2018-11-02 | End: 2018-12-12 | Stop reason: SDUPTHER

## 2018-11-15 ENCOUNTER — TELEPHONE (OUTPATIENT)
Dept: FAMILY MEDICINE CLINIC | Facility: CLINIC | Age: 23
End: 2018-11-15

## 2018-11-16 NOTE — TELEPHONE ENCOUNTER
Letter was created for the pt, and routed to Tashi Wick to be printed out, and stamped  Thanks    David

## 2018-12-07 ENCOUNTER — OFFICE VISIT (OUTPATIENT)
Dept: FAMILY MEDICINE CLINIC | Facility: CLINIC | Age: 23
End: 2018-12-07
Payer: COMMERCIAL

## 2018-12-07 VITALS
WEIGHT: 174 LBS | HEART RATE: 71 BPM | DIASTOLIC BLOOD PRESSURE: 76 MMHG | TEMPERATURE: 98 F | RESPIRATION RATE: 16 BRPM | BODY MASS INDEX: 28.1 KG/M2 | OXYGEN SATURATION: 98 % | SYSTOLIC BLOOD PRESSURE: 118 MMHG

## 2018-12-07 DIAGNOSIS — F41.8 DEPRESSION WITH ANXIETY: Primary | Chronic | ICD-10-CM

## 2018-12-07 PROCEDURE — 99213 OFFICE O/P EST LOW 20 MIN: CPT | Performed by: FAMILY MEDICINE

## 2018-12-07 RX ORDER — ARIPIPRAZOLE 2 MG/1
2 TABLET ORAL DAILY
Qty: 30 TABLET | Refills: 2 | Status: SHIPPED | OUTPATIENT
Start: 2018-12-07 | End: 2019-01-07 | Stop reason: SDUPTHER

## 2018-12-07 NOTE — PROGRESS NOTES
Assessment/Plan:    No problem-specific Assessment & Plan notes found for this encounter  Diagnoses and all orders for this visit:    Depression with anxiety  Comments:  Pt is stable on exam  She will stay on Escitalopram - we discussed a taper off of Bupropion over next 3 weeks, and we will add low dose Abilify at this time  Orders:  -     ARIPiprazole (ABILIFY) 2 mg tablet; Take 1 tablet (2 mg total) by mouth daily          Subjective:      Patient ID: Sherlyn Rivas is a 26 y o  female  Despite being both on Lexapro, and now Wellbutrin, [facility] has continued to struggle with her depression  We discussed treatment options (incl Psychiatry referral) at length today  We also discussed the potential R/SE of adding a medication like Abilify as well  She has no reported hx of manic episodes  Pt is not pregnant  No HI/SI  The following portions of the patient's history were reviewed and updated as appropriate: allergies, current medications, past medical history, past social history, past surgical history and problem list     Past Medical History:   Diagnosis Date    Allergic rhinitis due to pollen     LAST ASSESSED [id number]    Depression     Dysmenorrhea     LAST ASSESSED [id number]    Globus sensation     LAST ASSESSED [id number]    Wrist sprain     LAST ASSESSED [id number]       Review of Systems   Constitutional: Negative for activity change  Psychiatric/Behavioral: Positive for dysphoric mood  Negative for suicidal ideas  The patient is not nervous/anxious  Objective:      /76 (BP Location: Left arm, Patient Position: Sitting, Cuff Size: Standard)   Pulse 71   Temp 98 °F (36 7 °C) (Tympanic)   Resp 16   Wt 78 9 kg (174 lb)   SpO2 98%   BMI 28 10 kg/m²          Physical Exam   Constitutional: She is oriented to person, place, and time  She appears well-developed and well-nourished  No distress  HENT:   Head: Normocephalic and atraumatic     Neurological: She is alert and oriented to person, place, and time  Skin: She is not diaphoretic  Psychiatric: She has a normal mood and affect  Her behavior is normal  Judgment and thought content normal    Nursing note and vitals reviewed

## 2018-12-12 DIAGNOSIS — F41.8 DEPRESSION WITH ANXIETY: Chronic | ICD-10-CM

## 2018-12-12 DIAGNOSIS — N92.6 IRREGULAR BLEEDING: Primary | ICD-10-CM

## 2018-12-12 DIAGNOSIS — N92.6 IRREGULAR BLEEDING: ICD-10-CM

## 2018-12-12 RX ORDER — ESCITALOPRAM OXALATE 20 MG/1
20 TABLET ORAL DAILY
Qty: 90 TABLET | Refills: 1 | Status: SHIPPED | OUTPATIENT
Start: 2018-12-12 | End: 2019-03-11 | Stop reason: SDUPTHER

## 2018-12-12 RX ORDER — NORGESTREL-ETHINYL ESTRADIOL 0.3-0.03MG
TABLET ORAL
Qty: 84 TABLET | Refills: 2 | OUTPATIENT
Start: 2018-12-12

## 2018-12-12 NOTE — TELEPHONE ENCOUNTER
From: Brenda Jones  Sent: 12/12/2018 10:05 AM EST  Subject: Medication Renewal Request    Brenda Jones would like a refill of the following medications:     escitalopram (LEXAPRO) 20 mg tablet [Efren Marcos DO]    Preferred pharmacy: [facility]/PHARMACY #[id number] : [id number]
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
## 2019-01-07 DIAGNOSIS — F41.8 DEPRESSION WITH ANXIETY: Chronic | ICD-10-CM

## 2019-01-07 RX ORDER — ARIPIPRAZOLE 2 MG/1
2 TABLET ORAL DAILY
Qty: 90 TABLET | Refills: 0 | Status: SHIPPED | OUTPATIENT
Start: 2019-01-07 | End: 2019-03-18 | Stop reason: ALTCHOICE

## 2019-02-08 DIAGNOSIS — N92.6 IRREGULAR BLEEDING: Primary | ICD-10-CM

## 2019-03-07 DIAGNOSIS — N92.6 IRREGULAR BLEEDING: ICD-10-CM

## 2019-03-07 RX ORDER — NORGESTREL-ETHINYL ESTRADIOL 0.3-0.03MG
TABLET ORAL
Qty: 28 TABLET | Refills: 0 | OUTPATIENT
Start: 2019-03-07

## 2019-03-11 DIAGNOSIS — F41.8 DEPRESSION WITH ANXIETY: Chronic | ICD-10-CM

## 2019-03-11 RX ORDER — ESCITALOPRAM OXALATE 20 MG/1
20 TABLET ORAL DAILY
Qty: 90 TABLET | Refills: 1 | Status: SHIPPED | OUTPATIENT
Start: 2019-03-11 | End: 2019-09-09 | Stop reason: ALTCHOICE

## 2019-03-18 ENCOUNTER — ANNUAL EXAM (OUTPATIENT)
Dept: OBGYN CLINIC | Facility: MEDICAL CENTER | Age: 24
End: 2019-03-18
Payer: COMMERCIAL

## 2019-03-18 VITALS
SYSTOLIC BLOOD PRESSURE: 118 MMHG | WEIGHT: 181 LBS | HEIGHT: 65 IN | DIASTOLIC BLOOD PRESSURE: 72 MMHG | BODY MASS INDEX: 30.16 KG/M2

## 2019-03-18 DIAGNOSIS — N92.6 IRREGULAR BLEEDING: ICD-10-CM

## 2019-03-18 DIAGNOSIS — Z01.419 ENCNTR FOR GYN EXAM (GENERAL) (ROUTINE) W/O ABN FINDINGS: ICD-10-CM

## 2019-03-18 PROBLEM — Z77.21 HISTORY OF POTENTIALLY HAZARDOUS BODY FLUID EXPOSURE: Status: RESOLVED | Noted: 2018-02-22 | Resolved: 2019-03-18

## 2019-03-18 PROCEDURE — G0145 SCR C/V CYTO,THINLAYER,RESCR: HCPCS | Performed by: PHYSICIAN ASSISTANT

## 2019-03-18 PROCEDURE — 99395 PREV VISIT EST AGE 18-39: CPT | Performed by: PHYSICIAN ASSISTANT

## 2019-03-18 NOTE — PROGRESS NOTES
Kait Self  1995    CC:  Yearly exam    S:  26 y o  female here for yearly exam  Her cycles are regular, not heavy or crampy  She is sexually active  She uses Cryselle  for contraception  She does not request STD testing today  She is [place]       Last Pap never    Current Outpatient Medications:     escitalopram (LEXAPRO) 20 mg tablet, Take 1 tablet (20 mg total) by mouth daily for 90 days, Disp: 90 tablet, Rfl: 1    Loratadine (CLARITIN) 10 MG CAPS, Take 1 capsule (10 mg total) by mouth daily, Disp: 90 each, Rfl: 3    norgestrel-ethinyl estradiol (LO/OVRAL) 0 3 mg-30 mcg per tablet, Take 1 tablet by mouth daily (Patient not taking: Reported on 3/18/2019), Disp: 28 tablet, Rfl: 0  Social History     Socioeconomic History    Marital status: Single     Spouse name: Not on file    Number of children: Not on file    Years of education: Not on file    Highest education level: Not on file   Occupational History    Not on file   Social Needs    Financial resource strain: Not on file    Food insecurity:     Worry: Not on file     Inability: Not on file    Transportation needs:     Medical: Not on file     Non-medical: Not on file   Tobacco Use    Smoking status: Never Smoker    Smokeless tobacco: Never Used   Substance and Sexual Activity    Alcohol use: Yes     Frequency: 2-4 times a month     Drinks per session: 1 or 2     Binge frequency: Never    Drug use: No    Sexual activity: Yes     Partners: Male     Birth control/protection: Condom Male     Comment: Lo loestrin   Lifestyle    Physical activity:     Days per week: Not on file     Minutes per session: Not on file    Stress: Not on file   Relationships    Social connections:     Talks on phone: Not on file     Gets together: Not on file     Attends Worship service: Not on file     Active member of club or organization: Not on file     Attends meetings of clubs or organizations: Not on file     Relationship status: Not on file    Intimate partner violence:     Fear of current or ex partner: Not on file     Emotionally abused: Not on file     Physically abused: Not on file     Forced sexual activity: Not on file   Other Topics Concern    Not on file   Social History Narrative    CAFFEINE USE    DAILY COFFEE     Family History   Problem Relation Age of Onset    Other Mother         IDIOPATHIC THROMBOCYTOPENIC PURPURA    Alcohol abuse Father     Anemia Father     Esophageal cancer Maternal Grandfather     Bone cancer Maternal Grandfather     Multiple sclerosis Paternal Grandfather      Past Medical History:   Diagnosis Date    Allergic rhinitis due to pollen     LAST ASSESSED [id number]    Depression     Dysmenorrhea     LAST ASSESSED [id number]    Globus sensation     LAST ASSESSED [id number]    Wrist sprain     LAST ASSESSED [id number]         O:  Blood pressure 118/72, height 5' 5 35" (1 66 m), weight 82 1 kg (181 lb), last menstrual period 02/21/2019  Patient appears well and is not in distress  Neck is supple without masses  Breasts are symmetrical without mass, tenderness, nipple discharge, skin changes or adenopathy  Abdomen is soft and nontender without masses  External genitals are normal without lesions or rashes  Vagina is normal without discharge or bleeding  Cervix is normal without discharge or lesion  Uterus is normal, mobile, nontender without palpable mass  Adnexa are normal, nontender, without palpable mass  A:  Yearly exam      P:   Pap with reflex HPV today   Cryselle sent to pharmacy    RTO one year for yearly exam or sooner as needed

## 2019-03-21 LAB
LAB AP GYN PRIMARY INTERPRETATION: NORMAL
Lab: NORMAL

## 2019-04-08 DIAGNOSIS — F41.8 DEPRESSION WITH ANXIETY: Chronic | ICD-10-CM

## 2019-04-08 RX ORDER — ARIPIPRAZOLE 2 MG/1
TABLET ORAL
Qty: 90 TABLET | Refills: 0 | Status: SHIPPED | OUTPATIENT
Start: 2019-04-08 | End: 2019-07-04 | Stop reason: SDUPTHER

## 2019-07-04 DIAGNOSIS — F41.8 DEPRESSION WITH ANXIETY: Chronic | ICD-10-CM

## 2019-07-04 RX ORDER — ARIPIPRAZOLE 2 MG/1
TABLET ORAL
Qty: 90 TABLET | Refills: 1 | Status: SHIPPED | OUTPATIENT
Start: 2019-07-04 | End: 2019-09-09 | Stop reason: ALTCHOICE

## 2019-09-09 ENCOUNTER — OFFICE VISIT (OUTPATIENT)
Dept: FAMILY MEDICINE CLINIC | Facility: CLINIC | Age: 24
End: 2019-09-09
Payer: COMMERCIAL

## 2019-09-09 VITALS
TEMPERATURE: 98.4 F | HEIGHT: 66 IN | RESPIRATION RATE: 16 BRPM | WEIGHT: 173.4 LBS | BODY MASS INDEX: 27.87 KG/M2 | DIASTOLIC BLOOD PRESSURE: 94 MMHG | SYSTOLIC BLOOD PRESSURE: 134 MMHG | OXYGEN SATURATION: 98 % | HEART RATE: 67 BPM

## 2019-09-09 DIAGNOSIS — F41.8 DEPRESSION WITH ANXIETY: ICD-10-CM

## 2019-09-09 DIAGNOSIS — R63.5 WEIGHT GAIN: Primary | ICD-10-CM

## 2019-09-09 DIAGNOSIS — Z13.1 SCREENING FOR DIABETES MELLITUS: ICD-10-CM

## 2019-09-09 PROCEDURE — 3008F BODY MASS INDEX DOCD: CPT | Performed by: FAMILY MEDICINE

## 2019-09-09 PROCEDURE — 99214 OFFICE O/P EST MOD 30 MIN: CPT | Performed by: FAMILY MEDICINE

## 2019-09-09 NOTE — PROGRESS NOTES
Assessment/Plan:    No problem-specific Assessment & Plan notes found for this encounter  Diagnoses and all orders for this visit:    Weight gain  Comments:  Pt stable; unclear etiology for weight gain  Will check labs at this time with TSH incl  Pt to work on less carbs/salt in her diet & exercise  Nutrition consult  Orders:  -     CBC and differential; Future  -     TSH, 3rd generation with Free T4 reflex; Future  -     Ambulatory referral to Nutrition Services; Future    Depression with anxiety  Comments:  Stable at this time  Pt off all meds currently  Screening for diabetes mellitus  -     Lipid Panel with Direct LDL reflex; Future  -     Comprehensive metabolic panel; Future          Subjective:      Patient ID: Yaakov Giang is a 27 y o  female  [facility] presents in f/u today  She reports that she has gained weight consistently over the last 3 years - she is comparing herself in the three summers at home  Right foot fracture did not help her in recent months - limiting her exercise  Even periods where she was eating healthy, and exercising - no weight changes  Even binged and vomited at times  She has been off Escitalopram and Abilify for some time now  Her mother struggles also with weight          The following portions of the patient's history were reviewed and updated as appropriate: allergies, current medications, past family history, past social history, past surgical history and problem list     Past Medical History:   Diagnosis Date    Allergic rhinitis due to pollen     LAST ASSESSED [id number]    Depression     Dysmenorrhea     LAST ASSESSED [id number]    Globus sensation     LAST ASSESSED 57VXY3689    Wrist sprain     LAST ASSESSED 82SSX4660       Current Outpatient Medications:     norgestrel-ethinyl estradiol (LO/OVRAL) 0 3 mg-30 mcg per tablet, Take 1 tablet by mouth daily, Disp: 90 tablet, Rfl: 3    Allergies   Allergen Reactions    Fruit C [Ascorbate]      Fruit- stomach pains     Pollen Extract Allergic Rhinitis     Social History     Tobacco Use    Smoking status: Never Smoker    Smokeless tobacco: Never Used   Substance Use Topics    Alcohol use: Yes     Frequency: 2-4 times a month     Drinks per session: 1 or 2     Binge frequency: Never    Drug use: No     Family History   Problem Relation Age of Onset    Other Mother         IDIOPATHIC THROMBOCYTOPENIC PURPURA    Alcohol abuse Father     Anemia Father     Esophageal cancer Maternal Grandfather     Bone cancer Maternal Grandfather     Multiple sclerosis Paternal Grandfather        Review of Systems   Constitutional: Negative for activity change  +Weight gain  Respiratory: Negative for shortness of breath  Cardiovascular: Negative for chest pain  Gastrointestinal: Negative for blood in stool, constipation and diarrhea  Endocrine:        No hair loss  Genitourinary: Negative for menstrual problem  On OCP  Psychiatric/Behavioral: Negative for dysphoric mood  The patient is not nervous/anxious  The only stressful thing is her weight  No HI/SI  Objective:      /94 (BP Location: Left arm, Patient Position: Sitting, Cuff Size: Standard)   Pulse 67   Temp 98 4 °F (36 9 °C)   Resp 16   Ht 5' 5 87" (1 673 m)   Wt 78 7 kg (173 lb 6 4 oz)   SpO2 98%   BMI 28 10 kg/m²        Physical Exam   Constitutional: She is oriented to person, place, and time  She appears well-developed and well-nourished  No distress  HENT:   Head: Normocephalic and atraumatic  Eyes: Conjunctivae are normal    Neck: Normal range of motion  Neck supple  No thyromegaly present  Cardiovascular: Normal rate, regular rhythm and normal heart sounds  Exam reveals no gallop and no friction rub  No murmur heard  Pulmonary/Chest: Effort normal and breath sounds normal  No stridor  No respiratory distress  She has no wheezes  She has no rales     Lymphadenopathy:     She has no cervical adenopathy  Neurological: She is alert and oriented to person, place, and time  Skin: She is not diaphoretic  Psychiatric: She has a normal mood and affect  Her behavior is normal  Judgment and thought content normal    Pt tearful occasionally, when discussing her weight  Nursing note and vitals reviewed

## 2019-09-30 DIAGNOSIS — T75.3XXD MOTION SICKNESS, SUBSEQUENT ENCOUNTER: Primary | ICD-10-CM

## 2019-09-30 DIAGNOSIS — N92.6 IRREGULAR BLEEDING: ICD-10-CM

## 2019-09-30 RX ORDER — SCOLOPAMINE TRANSDERMAL SYSTEM 1 MG/1
1 PATCH, EXTENDED RELEASE TRANSDERMAL
Qty: 8 PATCH | Refills: 0 | Status: SHIPPED | OUTPATIENT
Start: 2019-09-30 | End: 2020-03-02 | Stop reason: ALTCHOICE

## 2019-12-30 DIAGNOSIS — N92.6 IRREGULAR BLEEDING: ICD-10-CM

## 2019-12-30 RX ORDER — NORGESTREL-ETHINYL ESTRADIOL 0.3-0.03MG
TABLET ORAL
Qty: 84 TABLET | Refills: 1 | Status: SHIPPED | OUTPATIENT
Start: 2019-12-30 | End: 2020-05-28 | Stop reason: SDUPTHER

## 2020-02-21 ENCOUNTER — TELEPHONE (OUTPATIENT)
Dept: OBGYN CLINIC | Facility: CLINIC | Age: 25
End: 2020-02-21

## 2020-02-21 NOTE — TELEPHONE ENCOUNTER
Patient called and said she would like to speak to someone because she is concerned about her irregular bleeding  Patient said she did not have a period for the last 2 months and then this month she got a period and has been bleeding for 2 weeks  Please advise

## 2020-02-21 NOTE — TELEPHONE ENCOUNTER
Returned pt call  Pt stated she has a normal period last week then is stopped for a day and now she is spotting, I advised pt she was placed on cryselle 12/30/2019 on 09/30/19 she was on LO/ovral even thou they have the same dosage and same hormones, they are from different manufacturers  Pt stated she took a hpt last month and it came back negative  Pt have to keep a menses calender, to give her body 4-6 months to regulate and if bleeding more than a saturated pad an hour to contact the office  Pt agreed with plan

## 2020-03-02 ENCOUNTER — OFFICE VISIT (OUTPATIENT)
Dept: OBGYN CLINIC | Facility: MEDICAL CENTER | Age: 25
End: 2020-03-02
Payer: COMMERCIAL

## 2020-03-02 VITALS — DIASTOLIC BLOOD PRESSURE: 70 MMHG | BODY MASS INDEX: 26.09 KG/M2 | WEIGHT: 161 LBS | SYSTOLIC BLOOD PRESSURE: 122 MMHG

## 2020-03-02 DIAGNOSIS — N92.6 IRREGULAR MENSES: Primary | ICD-10-CM

## 2020-03-02 PROCEDURE — 1036F TOBACCO NON-USER: CPT | Performed by: PHYSICIAN ASSISTANT

## 2020-03-02 PROCEDURE — 99213 OFFICE O/P EST LOW 20 MIN: CPT | Performed by: PHYSICIAN ASSISTANT

## 2020-03-02 NOTE — PROGRESS NOTES
Aaron Smith  1995    S:  27 y o  female here for a problem visit  She has been on Cryselle for years, and has been on the same generic for over a year  Recently she had no withdrawal bleeds for two months in a row, took negative pregnancy tests, then this past month had a longer than usual bleed  She took a negative home pregnancy test with this as well  She is concerned about premature menopause:   Mom stopped getting periods at age 32  Maternal grandmother stopped getting periods in her 35s  She and her  would like a pregnancy in the future but not soon, unless she learned that she was at risk for premature ovarian failure as well  We discussed checking her TSH and a day 5 FSH as well as referring her to [facility] for further evaluation - she is very interested in this        Past Medical History:   Diagnosis Date    Allergic rhinitis due to pollen     LAST ASSESSED [id number]    Depression     Dysmenorrhea     LAST ASSESSED 50DVH4692    Globus sensation     LAST ASSESSED [id number]    Wrist sprain     LAST ASSESSED [id number]     Family History   Problem Relation Age of Onset    Other Mother         IDIOPATHIC THROMBOCYTOPENIC PURPURA    Alcohol abuse Father     Anemia Father     Esophageal cancer Maternal Grandfather     Bone cancer Maternal Grandfather     Multiple sclerosis Paternal Grandfather      Social History     Socioeconomic History    Marital status: Single     Spouse name: None    Number of children: None    Years of education: None    Highest education level: None   Occupational History    None   Social Needs    Financial resource strain: None    Food insecurity:     Worry: None     Inability: None    Transportation needs:     Medical: None     Non-medical: None   Tobacco Use    Smoking status: Never Smoker    Smokeless tobacco: Never Used   Substance and Sexual Activity    Alcohol use: Yes     Frequency: 2-4 times a month     Drinks per session: 1 or 2 Binge frequency: Never    Drug use: No    Sexual activity: Yes     Partners: Male     Birth control/protection: Condom Male     Comment: Lo loestrin   Lifestyle    Physical activity:     Days per week: None     Minutes per session: None    Stress: None   Relationships    Social connections:     Talks on phone: None     Gets together: None     Attends Latter day service: None     Active member of club or organization: None     Attends meetings of clubs or organizations: None     Relationship status: None    Intimate partner violence:     Fear of current or ex partner: None     Emotionally abused: None     Physically abused: None     Forced sexual activity: None   Other Topics Concern    None   Social History Narrative    CAFFEINE USE    DAILY COFFEE       Review of Systems   Respiratory: Negative  Cardiovascular: Negative  Gastrointestinal: Negative for constipation and diarrhea  Genitourinary: Negative for difficulty urinating, pelvic pain, vaginal bleeding, vaginal discharge, itching or odor  O:  /70 (BP Location: Right arm, Patient Position: Sitting, Cuff Size: Standard)   Wt 73 kg (161 lb)   LMP 02/17/2020   BMI 26 09 kg/m²   She appears well and is in no distress  Abdomen is soft and nontender    A/P: Irregular menses  Check TSH, day 5 FSH   Referred to [facility])

## 2020-03-04 ENCOUNTER — TELEPHONE (OUTPATIENT)
Dept: OBGYN CLINIC | Facility: CLINIC | Age: 25
End: 2020-03-04

## 2020-03-04 DIAGNOSIS — Z31.41 FERTILITY TESTING: Primary | ICD-10-CM

## 2020-03-04 NOTE — TELEPHONE ENCOUNTER
Pt contacted and advised as directed  Pt asked if orders can be mailed to her and she will call her insurance to make sure they are covered before she goes to the [facility]'s lab to complete them on day 3 of her cycle  Orders placed and mailed to patient at home address on file

## 2020-03-04 NOTE — TELEPHONE ENCOUNTER
I cannot be sure her insurance will cover it  If she wishes to check it, please check FSH, LH, estradiol, progesterone, and AMH level on day 3 of her cycle  Thanks!

## 2020-03-04 NOTE — TELEPHONE ENCOUNTER
Pt last seen with  Jose Alfredo Zuleta 3/2 (family planning),  Pt would like to know if she can have her AMH level tested, this was not ordered by Jose Alfredo Zuleta,  pls call pt to advise [phone]

## 2020-03-14 ENCOUNTER — LAB (OUTPATIENT)
Dept: LAB | Age: 25
End: 2020-03-14
Payer: COMMERCIAL

## 2020-03-14 DIAGNOSIS — R63.5 WEIGHT GAIN: ICD-10-CM

## 2020-03-14 DIAGNOSIS — Z31.41 FERTILITY TESTING: ICD-10-CM

## 2020-03-14 DIAGNOSIS — Z13.1 SCREENING FOR DIABETES MELLITUS: ICD-10-CM

## 2020-03-14 DIAGNOSIS — N92.6 IRREGULAR MENSES: ICD-10-CM

## 2020-03-14 LAB
ALBUMIN SERPL BCP-MCNC: 4.3 G/DL (ref 3.5–5)
ALP SERPL-CCNC: 60 U/L (ref 46–116)
ALT SERPL W P-5'-P-CCNC: 35 U/L (ref 12–78)
ANION GAP SERPL CALCULATED.3IONS-SCNC: 7 MMOL/L (ref 4–13)
AST SERPL W P-5'-P-CCNC: 21 U/L (ref 5–45)
BASOPHILS # BLD AUTO: 0.02 THOUSANDS/ΜL (ref 0–0.1)
BASOPHILS NFR BLD AUTO: 0 % (ref 0–1)
BILIRUB SERPL-MCNC: 0.79 MG/DL (ref 0.2–1)
BUN SERPL-MCNC: 11 MG/DL (ref 5–25)
CALCIUM SERPL-MCNC: 9 MG/DL (ref 8.3–10.1)
CHLORIDE SERPL-SCNC: 105 MMOL/L (ref 100–108)
CHOLEST SERPL-MCNC: 167 MG/DL (ref 50–200)
CO2 SERPL-SCNC: 25 MMOL/L (ref 21–32)
CREAT SERPL-MCNC: 0.99 MG/DL (ref 0.6–1.3)
EOSINOPHIL # BLD AUTO: 0.07 THOUSAND/ΜL (ref 0–0.61)
EOSINOPHIL NFR BLD AUTO: 2 % (ref 0–6)
ERYTHROCYTE [DISTWIDTH] IN BLOOD BY AUTOMATED COUNT: 12.2 % (ref 11.6–15.1)
ESTRADIOL SERPL-MCNC: <11 PG/ML
FSH SERPL-ACNC: 5.8 MIU/ML
GFR SERPL CREATININE-BSD FRML MDRD: 81 ML/MIN/1.73SQ M
GLUCOSE P FAST SERPL-MCNC: 75 MG/DL (ref 65–99)
HCT VFR BLD AUTO: 47.4 % (ref 34.8–46.1)
HDLC SERPL-MCNC: 54 MG/DL
HGB BLD-MCNC: 15.7 G/DL (ref 11.5–15.4)
IMM GRANULOCYTES # BLD AUTO: 0.01 THOUSAND/UL (ref 0–0.2)
IMM GRANULOCYTES NFR BLD AUTO: 0 % (ref 0–2)
LDLC SERPL CALC-MCNC: 101 MG/DL (ref 0–100)
LH SERPL-ACNC: 2.4 MIU/ML
LYMPHOCYTES # BLD AUTO: 2.75 THOUSANDS/ΜL (ref 0.6–4.47)
LYMPHOCYTES NFR BLD AUTO: 58 % (ref 14–44)
MCH RBC QN AUTO: 30.4 PG (ref 26.8–34.3)
MCHC RBC AUTO-ENTMCNC: 33.1 G/DL (ref 31.4–37.4)
MCV RBC AUTO: 92 FL (ref 82–98)
MONOCYTES # BLD AUTO: 0.39 THOUSAND/ΜL (ref 0.17–1.22)
MONOCYTES NFR BLD AUTO: 8 % (ref 4–12)
NEUTROPHILS # BLD AUTO: 1.52 THOUSANDS/ΜL (ref 1.85–7.62)
NEUTS SEG NFR BLD AUTO: 32 % (ref 43–75)
NRBC BLD AUTO-RTO: 0 /100 WBCS
PLATELET # BLD AUTO: 262 THOUSANDS/UL (ref 149–390)
PMV BLD AUTO: 10.1 FL (ref 8.9–12.7)
POTASSIUM SERPL-SCNC: 4.1 MMOL/L (ref 3.5–5.3)
PROGEST SERPL-MCNC: 1.6 NG/ML
PROT SERPL-MCNC: 7.9 G/DL (ref 6.4–8.2)
RBC # BLD AUTO: 5.16 MILLION/UL (ref 3.81–5.12)
SODIUM SERPL-SCNC: 137 MMOL/L (ref 136–145)
TRIGL SERPL-MCNC: 61 MG/DL
TSH SERPL DL<=0.05 MIU/L-ACNC: 1.11 UIU/ML (ref 0.36–3.74)
WBC # BLD AUTO: 4.76 THOUSAND/UL (ref 4.31–10.16)

## 2020-03-14 PROCEDURE — 84144 ASSAY OF PROGESTERONE: CPT

## 2020-03-14 PROCEDURE — 84443 ASSAY THYROID STIM HORMONE: CPT

## 2020-03-14 PROCEDURE — 83002 ASSAY OF GONADOTROPIN (LH): CPT

## 2020-03-14 PROCEDURE — 36415 COLL VENOUS BLD VENIPUNCTURE: CPT

## 2020-03-14 PROCEDURE — 83001 ASSAY OF GONADOTROPIN (FSH): CPT

## 2020-03-14 PROCEDURE — 82670 ASSAY OF TOTAL ESTRADIOL: CPT

## 2020-03-14 PROCEDURE — 80053 COMPREHEN METABOLIC PANEL: CPT

## 2020-03-14 PROCEDURE — 80061 LIPID PANEL: CPT

## 2020-03-14 PROCEDURE — 85025 COMPLETE CBC W/AUTO DIFF WBC: CPT

## 2020-03-14 PROCEDURE — 83516 IMMUNOASSAY NONANTIBODY: CPT

## 2020-03-14 NOTE — RESULT ENCOUNTER NOTE
Please let the patient know that their bloodwork for us normal - glucose and cholesterol  Thanks     Dr Cheyenne Ramirez

## 2020-03-18 LAB — MIS SERPL-MCNC: 0.72 NG/ML

## 2020-05-28 DIAGNOSIS — N92.6 IRREGULAR BLEEDING: ICD-10-CM

## 2020-11-06 ENCOUNTER — TELEPHONE (OUTPATIENT)
Dept: OBGYN CLINIC | Facility: CLINIC | Age: 25
End: 2020-11-06

## 2020-11-06 DIAGNOSIS — N92.6 IRREGULAR BLEEDING: ICD-10-CM

## 2020-11-06 RX ORDER — NORGESTREL-ETHINYL ESTRADIOL 0.3-0.03MG
1 TABLET ORAL DAILY
Qty: 84 TABLET | Refills: 0 | Status: SHIPPED | OUTPATIENT
Start: 2020-11-06 | End: 2022-01-24 | Stop reason: SDUPTHER

## 2020-11-11 ENCOUNTER — OFFICE VISIT (OUTPATIENT)
Dept: FAMILY MEDICINE CLINIC | Facility: CLINIC | Age: 25
End: 2020-11-11
Payer: COMMERCIAL

## 2020-11-11 VITALS
TEMPERATURE: 97.9 F | BODY MASS INDEX: 23.63 KG/M2 | SYSTOLIC BLOOD PRESSURE: 118 MMHG | WEIGHT: 145.8 LBS | HEART RATE: 68 BPM | DIASTOLIC BLOOD PRESSURE: 60 MMHG | RESPIRATION RATE: 16 BRPM | OXYGEN SATURATION: 98 %

## 2020-11-11 DIAGNOSIS — F41.9 ANXIETY: Primary | ICD-10-CM

## 2020-11-11 PROCEDURE — 99213 OFFICE O/P EST LOW 20 MIN: CPT | Performed by: FAMILY MEDICINE

## 2020-11-11 RX ORDER — LORAZEPAM 0.5 MG/1
0.5 TABLET ORAL 2 TIMES DAILY PRN
Qty: 40 TABLET | Refills: 1 | Status: SHIPPED | OUTPATIENT
Start: 2020-11-11 | End: 2022-01-19 | Stop reason: ALTCHOICE

## 2020-11-11 RX ORDER — ESCITALOPRAM OXALATE 10 MG/1
10 TABLET ORAL DAILY
Qty: 90 TABLET | Refills: 1 | Status: SHIPPED | OUTPATIENT
Start: 2020-11-11 | End: 2022-01-19 | Stop reason: SDUPTHER

## 2021-10-25 ENCOUNTER — OFFICE VISIT (OUTPATIENT)
Dept: URGENT CARE | Age: 26
End: 2021-10-25
Payer: COMMERCIAL

## 2021-10-25 ENCOUNTER — APPOINTMENT (OUTPATIENT)
Dept: RADIOLOGY | Age: 26
End: 2021-10-25
Payer: COMMERCIAL

## 2021-10-25 VITALS — HEART RATE: 69 BPM | RESPIRATION RATE: 18 BRPM | TEMPERATURE: 98.6 F | OXYGEN SATURATION: 100 %

## 2021-10-25 DIAGNOSIS — Z11.52 ENCOUNTER FOR SCREENING FOR COVID-19: ICD-10-CM

## 2021-10-25 DIAGNOSIS — R06.02 SHORTNESS OF BREATH: Primary | ICD-10-CM

## 2021-10-25 DIAGNOSIS — J02.0 STREP PHARYNGITIS: ICD-10-CM

## 2021-10-25 DIAGNOSIS — R06.02 SHORTNESS OF BREATH: ICD-10-CM

## 2021-10-25 LAB — SARS-COV-2 RNA RESP QL NAA+PROBE: NEGATIVE

## 2021-10-25 PROCEDURE — U0005 INFEC AGEN DETEC AMPLI PROBE: HCPCS | Performed by: NURSE PRACTITIONER

## 2021-10-25 PROCEDURE — 71046 X-RAY EXAM CHEST 2 VIEWS: CPT

## 2021-10-25 PROCEDURE — G0382 LEV 3 HOSP TYPE B ED VISIT: HCPCS | Performed by: NURSE PRACTITIONER

## 2021-10-25 PROCEDURE — U0003 INFECTIOUS AGENT DETECTION BY NUCLEIC ACID (DNA OR RNA); SEVERE ACUTE RESPIRATORY SYNDROME CORONAVIRUS 2 (SARS-COV-2) (CORONAVIRUS DISEASE [COVID-19]), AMPLIFIED PROBE TECHNIQUE, MAKING USE OF HIGH THROUGHPUT TECHNOLOGIES AS DESCRIBED BY CMS-2020-01-R: HCPCS | Performed by: NURSE PRACTITIONER

## 2021-10-25 RX ORDER — AMOXICILLIN 500 MG/1
500 CAPSULE ORAL 2 TIMES DAILY
Qty: 14 CAPSULE | Refills: 0 | Status: SHIPPED | OUTPATIENT
Start: 2021-10-25 | End: 2021-11-01

## 2021-10-25 RX ORDER — PREDNISONE 20 MG/1
20 TABLET ORAL 2 TIMES DAILY WITH MEALS
Qty: 10 TABLET | Refills: 0 | Status: SHIPPED | OUTPATIENT
Start: 2021-10-25 | End: 2021-10-30

## 2022-01-19 ENCOUNTER — OFFICE VISIT (OUTPATIENT)
Dept: FAMILY MEDICINE CLINIC | Facility: CLINIC | Age: 27
End: 2022-01-19
Payer: COMMERCIAL

## 2022-01-19 VITALS
HEIGHT: 65 IN | SYSTOLIC BLOOD PRESSURE: 118 MMHG | HEART RATE: 68 BPM | WEIGHT: 157 LBS | RESPIRATION RATE: 12 BRPM | TEMPERATURE: 98.9 F | OXYGEN SATURATION: 99 % | DIASTOLIC BLOOD PRESSURE: 80 MMHG | BODY MASS INDEX: 26.16 KG/M2

## 2022-01-19 DIAGNOSIS — Z00.00 ANNUAL PHYSICAL EXAM: Primary | ICD-10-CM

## 2022-01-19 DIAGNOSIS — F41.8 DEPRESSION WITH ANXIETY: ICD-10-CM

## 2022-01-19 PROCEDURE — 99395 PREV VISIT EST AGE 18-39: CPT | Performed by: FAMILY MEDICINE

## 2022-01-19 RX ORDER — ESCITALOPRAM OXALATE 10 MG/1
10 TABLET ORAL DAILY
Qty: 90 TABLET | Refills: 1 | Status: SHIPPED | OUTPATIENT
Start: 2022-01-19 | End: 2022-04-25 | Stop reason: SDUPTHER

## 2022-01-19 NOTE — PROGRESS NOTES
[address]    NAME: Tamra Vazquez  AGE: 27 y o  SEX: female  : 1995     DATE: 2022     Assessment and Plan:     Problem List Items Addressed This Visit        Other    Depression with anxiety    Relevant Medications    escitalopram (LEXAPRO) 10 mg tablet      Other Visit Diagnoses     Annual physical exam    -  Primary    Pt appears well  She is to continue a lower carb diet, and to get back to regular exercise  BMI 26 0-26 9,adult        Diet and exercise as above  Immunizations and preventive care screenings were discussed with patient today  Appropriate education was printed on patient's after visit summary  Counseling:  Dental Health: discussed importance of regular tooth brushing, flossing, and dental visits  · Exercise: the importance of regular exercise/physical activity was discussed  Recommend exercise 3-5 times per week for at least 30 minutes  BMI Counseling: Body mass index is 26 13 kg/m²  The BMI is above normal  Nutrition recommendations include moderation in carbohydrate intake  Exercise recommendations include exercising 3-5 times per week  No pharmacotherapy was ordered  Patient referred to PCP  Rationale for BMI follow-up plan is due to patient being overweight or obese  Return in 3 months (on 2022) for Recheck  Chief Complaint:     Chief Complaint   Patient presents with    Physical Exam     patient being seen for physical       History of Present Illness:     Adult Annual Physical   Patient here for a comprehensive physical exam  The patient reports no problems  [place] is returning to the practice   working now for [employer]  PAP smear is UTD  Reviewed normal labs from 2021  Pt declines vaccination against COV-19 and Flu  Pt is not pregnant at this time  Diet and Physical Activity  · Diet/Nutrition: well balanced diet     · Exercise: 1-2 times a week on average  Depression Screening  PHQ-2/9 Depression Screening         General Health  · Sleep: sleeps poorly  · Hearing: normal - bilateral   · Vision: no vision problems  · Dental: regular dental visits  /GYN Health  · Last menstrual period: Menses regular  · Contraceptive method: oral contraceptives  · History of STDs?: no      Review of Systems:     Review of Systems   Constitutional: Negative for activity change  Respiratory: Negative for shortness of breath  Cardiovascular: Negative for chest pain  Gastrointestinal: Negative for abdominal pain  Genitourinary: Negative for menstrual problem  No new breast lumps on self-examination  Psychiatric/Behavioral: Positive for dysphoric mood  Negative for suicidal ideas  The patient is nervous/anxious  No HI/SI - off Lexapro for over a year now  Past Medical History:     Past Medical History:   Diagnosis Date    Allergic rhinitis due to pollen     LAST ASSESSED [id number]    Depression     Dysmenorrhea     LAST ASSESSED [id number]    Globus sensation     LAST ASSESSED [id number]    Wrist sprain     LAST ASSESSED [id number]      Past Surgical History:     Past Surgical History:   Procedure Laterality Date    NO PAST SURGERIES        Social History:     Social History     Socioeconomic History    Marital status: /Civil Union     Spouse name: None    Number of children: None    Years of education: None    Highest education level: None   Occupational History    None   Tobacco Use    Smoking status: Never Smoker    Smokeless tobacco: Never Used   Substance and Sexual Activity    Alcohol use:  Yes    Drug use: No    Sexual activity: Yes     Partners: Male     Birth control/protection: Condom Male     Comment: Lo loestrin   Other Topics Concern    None   Social History Narrative    CAFFEINE USE    DAILY COFFEE     Social Determinants of Health     Financial Resource Strain: Not on file   Food Insecurity: Not on file   Transportation Needs: Not on file   Physical Activity: Not on file   Stress: Not on file   Social Connections: Not on file   Intimate Partner Violence: Not on file   Housing Stability: Not on file      Family History:     Family History   Problem Relation Age of Onset    Other Mother         IDIOPATHIC THROMBOCYTOPENIC PURPURA    Alcohol abuse Father     Anemia Father     Esophageal cancer Maternal Grandfather     Bone cancer Maternal Grandfather     Multiple sclerosis Paternal Grandfather       Current Medications:     Current Outpatient Medications   Medication Sig Dispense Refill    norgestrel-ethinyl estradiol (Cryselle-28) 0 3 mg-30 mcg per tablet Take 1 tablet by mouth daily 84 tablet 0    escitalopram (LEXAPRO) 10 mg tablet Take 1 tablet (10 mg total) by mouth daily 90 tablet 1     No current facility-administered medications for this visit  Allergies: Allergies   Allergen Reactions    Fruit C [Ascorbate - Food Allergy]      Fruit- stomach pains     Pollen Extract Allergic Rhinitis      Physical Exam:     /80 (BP Location: Left arm, Patient Position: Sitting, Cuff Size: Standard)   Pulse 68   Temp 98 9 °F (37 2 °C) (Tympanic)   Resp 12   Ht 5' 5" (1 651 m)   Wt 71 2 kg (157 lb)   SpO2 99%   BMI 26 13 kg/m²     Physical Exam  Vitals and nursing note reviewed  Constitutional:       General: She is not in acute distress  Appearance: Normal appearance  She is not ill-appearing, toxic-appearing or diaphoretic  HENT:      Head: Normocephalic and atraumatic  Eyes:      General: No scleral icterus  Conjunctiva/sclera: Conjunctivae normal    Cardiovascular:      Rate and Rhythm: Normal rate and regular rhythm  Heart sounds: Normal heart sounds  No murmur heard  No friction rub  No gallop  Pulmonary:      Effort: Pulmonary effort is normal  No respiratory distress  Breath sounds: Normal breath sounds  No stridor   No wheezing, rhonchi or rales  Abdominal:      General: Abdomen is flat  Bowel sounds are normal  There is no distension  Palpations: Abdomen is soft  There is no mass  Tenderness: There is no abdominal tenderness  There is no guarding or rebound  Musculoskeletal:      Cervical back: Normal range of motion and neck supple  No rigidity or tenderness  Lymphadenopathy:      Cervical: No cervical adenopathy  Neurological:      Mental Status: She is alert and oriented to person, place, and time  Psychiatric:         Mood and Affect: Mood normal          Behavior: Behavior normal          Thought Content: Thought content normal          Judgment: Judgment normal           [facility] was seen today for physical exam     Diagnoses and all orders for this visit:    Annual physical exam  Comments:  Pt appears well  She is to continue a lower carb diet, and to get back to regular exercise  BMI 26 0-26 9,adult  Comments:  Diet and exercise as above  Depression with anxiety  Comments:  Pt stable - disc treatment at length today, potential R/SE, etc  Will restart pt on daily Escitalopram at this time  Precautions given  Orders:  -     escitalopram (LEXAPRO) 10 mg tablet;  Take 1 tablet (10 mg total) by mouth daily          Efren Jones, DO   [address] [facility]

## 2022-01-19 NOTE — PATIENT INSTRUCTIONS

## 2022-01-24 ENCOUNTER — OFFICE VISIT (OUTPATIENT)
Dept: OBGYN CLINIC | Facility: MEDICAL CENTER | Age: 27
End: 2022-01-24
Payer: COMMERCIAL

## 2022-01-24 VITALS — WEIGHT: 159.8 LBS | SYSTOLIC BLOOD PRESSURE: 120 MMHG | DIASTOLIC BLOOD PRESSURE: 62 MMHG | BODY MASS INDEX: 26.59 KG/M2

## 2022-01-24 DIAGNOSIS — N92.6 IRREGULAR BLEEDING: ICD-10-CM

## 2022-01-24 DIAGNOSIS — N91.4 SECONDARY OLIGOMENORRHEA: Primary | ICD-10-CM

## 2022-01-24 PROBLEM — F41.9 ANXIETY: Status: ACTIVE | Noted: 2021-05-03

## 2022-01-24 PROBLEM — Z83.49: Status: ACTIVE | Noted: 2021-01-29

## 2022-01-24 PROBLEM — F32.0 MILD MAJOR DEPRESSION (HCC): Status: ACTIVE | Noted: 2021-05-03

## 2022-01-24 PROBLEM — Z84.2: Status: ACTIVE | Noted: 2021-01-29

## 2022-01-24 PROCEDURE — 99213 OFFICE O/P EST LOW 20 MIN: CPT | Performed by: PHYSICIAN ASSISTANT

## 2022-01-24 RX ORDER — NORGESTREL-ETHINYL ESTRADIOL 0.3-0.03MG
1 TABLET ORAL DAILY
Qty: 84 TABLET | Refills: 0 | Status: SHIPPED | OUTPATIENT
Start: 2022-01-24 | End: 2022-03-21 | Stop reason: SDUPTHER

## 2022-01-24 NOTE — PROGRESS NOTES
Merry Tushar  1995    S:  27 y o  female here for follow-up of her family history of premature ovarian failure in her mother at age 32 and her maternal grandmother in her 35s  She was seen at [facility] last year due to an insurance issue; she had normal fragile X testing there  Two years ago I had recommended [facility] referral but she had difficulty getting an appointment  She would like a pregnancy in about two years  She is   She is currently on Cryselle for contraception  She gets a regular withdrawal bleed with this  She did run out of pills due to a pharmacy issue and was off it for two weeks; during this time she noticed a significant improvement in her libido  We discussed other options like a progesterone only pill, Nexplanon, or IUD  Depo would not be recommended (she had a poor experience with this in the past anyway)  Past Medical History:   Diagnosis Date    Allergic rhinitis due to pollen     LAST ASSESSED [id number]    Depression     Dysmenorrhea     LAST ASSESSED [id number]    Globus sensation     LAST ASSESSED [id number]    Wrist sprain     LAST ASSESSED [id number]     Family History   Problem Relation Age of Onset    Other Mother         IDIOPATHIC THROMBOCYTOPENIC PURPURA    Alcohol abuse Father     Anemia Father     Esophageal cancer Maternal Grandfather     Bone cancer Maternal Grandfather     Multiple sclerosis Paternal Grandfather      Social History     Socioeconomic History    Marital status: /Civil Union     Spouse name: None    Number of children: None    Years of education: None    Highest education level: None   Occupational History    None   Tobacco Use    Smoking status: Never Smoker    Smokeless tobacco: Never Used   Substance and Sexual Activity    Alcohol use:  Yes    Drug use: No    Sexual activity: Yes     Partners: Male     Birth control/protection: Condom Male     Comment: Lo loestrin   Other Topics Concern    None Social History Narrative    CAFFEINE USE    DAILY COFFEE     Social Determinants of Health     Financial Resource Strain: Not on file   Food Insecurity: Not on file   Transportation Needs: Not on file   Physical Activity: Not on file   Stress: Not on file   Social Connections: Not on file   Intimate Partner Violence: Not on file   Housing Stability: Not on file       Review of Systems   Respiratory: Negative  Cardiovascular: Negative  Gastrointestinal: Negative for constipation and diarrhea  Genitourinary: Negative for difficulty urinating, pelvic pain, vaginal bleeding, vaginal discharge, itching or odor  O:  /62 (BP Location: Right arm, Patient Position: Sitting, Cuff Size: Standard)   Wt 72 5 kg (159 lb 12 8 oz)   LMP 01/10/2022 (Approximate)   BMI 26 59 kg/m²   She appears well and is in no distress    A/P: Family history of premature ovarian failure  Check FSH, LH, estradiol, AMH on day 3 of her cycle  Low libido  Consider switch to progesterone only option   Contraception    Cryselle sent to pharmacy     RTO 3/2022 for annual exam, sooner PRN

## 2022-02-13 ENCOUNTER — APPOINTMENT (OUTPATIENT)
Dept: LAB | Age: 27
End: 2022-02-13
Payer: COMMERCIAL

## 2022-02-13 DIAGNOSIS — N91.4 SECONDARY OLIGOMENORRHEA: ICD-10-CM

## 2022-02-13 LAB
ESTRADIOL SERPL-MCNC: 18 PG/ML
FSH SERPL-ACNC: 1.7 MIU/ML
LH SERPL-ACNC: 0.9 MIU/ML

## 2022-02-13 PROCEDURE — 36415 COLL VENOUS BLD VENIPUNCTURE: CPT

## 2022-02-13 PROCEDURE — 82397 CHEMILUMINESCENT ASSAY: CPT

## 2022-02-13 PROCEDURE — 83001 ASSAY OF GONADOTROPIN (FSH): CPT

## 2022-02-13 PROCEDURE — 82670 ASSAY OF TOTAL ESTRADIOL: CPT

## 2022-02-13 PROCEDURE — 83002 ASSAY OF GONADOTROPIN (LH): CPT

## 2022-02-19 LAB — MIS SERPL-MCNC: 0.75 NG/ML

## 2022-02-22 ENCOUNTER — TELEPHONE (OUTPATIENT)
Dept: OBGYN CLINIC | Facility: CLINIC | Age: 27
End: 2022-02-22

## 2022-02-22 NOTE — TELEPHONE ENCOUNTER
----- Message from Bertin Langford PA-C sent at 2/22/2022 12:44 PM EST -----  Please let [place] know that her AMH level is low; we like to see it above 1 and hers is 0 748     I would strongly recommend referral to [facility] for evaluation (Nancie Mendoza, etc)

## 2022-02-23 ENCOUNTER — TELEPHONE (OUTPATIENT)
Dept: OBGYN CLINIC | Facility: CLINIC | Age: 27
End: 2022-02-23

## 2022-02-23 NOTE — TELEPHONE ENCOUNTER
----- Message from Saima sent at 2/23/2022  7:43 AM EST -----  Regarding: Question regarding FOLLICLE STIMULATING HORMONE  would you be able to explain my recent blood work results? The AMH is comparable to the 2020 results but the other 3 appear to be a bit of a difference

## 2022-03-21 ENCOUNTER — ANNUAL EXAM (OUTPATIENT)
Dept: OBGYN CLINIC | Facility: MEDICAL CENTER | Age: 27
End: 2022-03-21
Payer: COMMERCIAL

## 2022-03-21 VITALS
DIASTOLIC BLOOD PRESSURE: 74 MMHG | BODY MASS INDEX: 27.06 KG/M2 | HEIGHT: 65 IN | WEIGHT: 162.4 LBS | SYSTOLIC BLOOD PRESSURE: 120 MMHG

## 2022-03-21 DIAGNOSIS — Z01.419 ENCNTR FOR GYN EXAM (GENERAL) (ROUTINE) W/O ABN FINDINGS: ICD-10-CM

## 2022-03-21 DIAGNOSIS — N92.6 IRREGULAR BLEEDING: ICD-10-CM

## 2022-03-21 PROCEDURE — S0612 ANNUAL GYNECOLOGICAL EXAMINA: HCPCS | Performed by: PHYSICIAN ASSISTANT

## 2022-03-21 PROCEDURE — G0145 SCR C/V CYTO,THINLAYER,RESCR: HCPCS | Performed by: PHYSICIAN ASSISTANT

## 2022-03-21 RX ORDER — NORGESTREL-ETHINYL ESTRADIOL 0.3-0.03MG
1 TABLET ORAL DAILY
Qty: 84 TABLET | Refills: 4 | Status: SHIPPED | OUTPATIENT
Start: 2022-03-21 | End: 2022-03-25 | Stop reason: SDUPTHER

## 2022-03-21 NOTE — PROGRESS NOTES
Neeraj Max  1995    CC:  Yearly exam    S:  27 y o  female here for yearly exam  Her cycles are regular, not heavy or crampy  She reports premature ovarian failure in her mom at age 32 and her maternal grandmother in her 35s  Her AMH level this year is 0 76  I had recommended [facility] referral; she has no insurance coverage  I suggested seeing what out of pocket cost would be for a consult  Sexual activity: She is sexually active without pain, bleeding or dryness  Contraception:  She uses Cryselle for contraception  STD testing:  She does not request STD testing today  Gardasil:  She has had the Gardasil series  We reviewed ASCCP guidelines for Pap testing  Last Pap 3/18/19 neg    Family hx of breast cancer: no  Family hx of ovarian cancer: no  Family hx of colon cancer: no      Current Outpatient Medications:     escitalopram (LEXAPRO) 10 mg tablet, Take 1 tablet (10 mg total) by mouth daily, Disp: 90 tablet, Rfl: 1    norgestrel-ethinyl estradiol (Cryselle-28) 0 3 mg-30 mcg per tablet, Take 1 tablet by mouth daily, Disp: 84 tablet, Rfl: 0  Social History     Socioeconomic History    Marital status: /Civil Union     Spouse name: Not on file    Number of children: Not on file    Years of education: Not on file    Highest education level: Not on file   Occupational History    Not on file   Tobacco Use    Smoking status: Never Smoker    Smokeless tobacco: Never Used   Vaping Use    Vaping Use: Never used   Substance and Sexual Activity    Alcohol use:  Yes    Drug use: No    Sexual activity: Yes     Partners: Male     Birth control/protection: Condom Male     Comment: Lo loestrin   Other Topics Concern    Not on file   Social History Narrative    CAFFEINE USE    DAILY COFFEE     Social Determinants of Health     Financial Resource Strain: Not on file   Food Insecurity: Not on file   Transportation Needs: Not on file   Physical Activity: Not on file   Stress: Not on file   Social Connections: Not on file   Intimate Partner Violence: Not on file   Housing Stability: Not on file     Family History   Problem Relation Age of Onset    Other Mother         IDIOPATHIC THROMBOCYTOPENIC PURPURA    Alcohol abuse Father     Anemia Father     Esophageal cancer Maternal Grandfather     Bone cancer Maternal Grandfather     Multiple sclerosis Paternal Grandfather      Past Medical History:   Diagnosis Date    Allergic rhinitis due to pollen     LAST ASSESSED [id number]    Depression     Dysmenorrhea     LAST ASSESSED [id number]    Globus sensation     LAST ASSESSED [id number]    Wrist sprain     LAST ASSESSED [id number]       Review of Systems   Respiratory: Negative  Cardiovascular: Negative  Gastrointestinal: Negative for constipation and diarrhea  Genitourinary: Negative for difficulty urinating, pelvic pain, vaginal bleeding, vaginal discharge, itching or odor  O:  Blood pressure 120/74, height 5' 5" (1 651 m), weight 73 7 kg (162 lb 6 4 oz), last menstrual period 03/14/2022  Patient appears well and is not in distress  Neck is supple without masses  Breasts are symmetrical without mass, tenderness, nipple discharge, skin changes or adenopathy  Abdomen is soft and nontender without masses  External genitals are normal without lesions or rashes  Urethra and urethral meatus are normal  Bladder is normal to palpation  Vagina is normal without discharge or bleeding  Cervix is normal without discharge or lesion  Uterus is normal, mobile, nontender without palpable mass  Adnexa are normal, nontender, without palpable mass  A:   Yearly exam      P:   Pap with reflex HPV today    Cryselle sent to mail order    RTO one year for yearly exam or sooner as needed

## 2022-03-25 LAB
LAB AP GYN PRIMARY INTERPRETATION: NORMAL
Lab: NORMAL

## 2022-04-13 ENCOUNTER — HOSPITAL ENCOUNTER (OUTPATIENT)
Dept: RADIOLOGY | Facility: HOSPITAL | Age: 27
Discharge: HOME | End: 2022-04-13
Payer: COMMERCIAL

## 2022-04-13 ENCOUNTER — OFFICE VISIT (OUTPATIENT)
Dept: OBGYN CLINIC | Facility: CLINIC | Age: 27
End: 2022-04-13
Payer: COMMERCIAL

## 2022-04-13 VITALS — WEIGHT: 162.8 LBS | BODY MASS INDEX: 27.12 KG/M2 | HEIGHT: 65 IN | HEART RATE: 95 BPM | OXYGEN SATURATION: 99 %

## 2022-04-13 DIAGNOSIS — M79.671 PAIN IN RIGHT FOOT: ICD-10-CM

## 2022-04-13 DIAGNOSIS — M25.571 PAIN, JOINT, ANKLE AND FOOT, RIGHT: ICD-10-CM

## 2022-04-13 DIAGNOSIS — S89.80XA OVERUSE INJURY OF LOWER LEG: Primary | ICD-10-CM

## 2022-04-13 PROCEDURE — 73610 X-RAY EXAM OF ANKLE: CPT

## 2022-04-13 PROCEDURE — 73630 X-RAY EXAM OF FOOT: CPT

## 2022-04-13 PROCEDURE — 99203 OFFICE O/P NEW LOW 30 MIN: CPT | Performed by: PHYSICIAN ASSISTANT

## 2022-04-13 NOTE — PROGRESS NOTES
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  She had a stress fracture in the past (doesn't remember which bone) and this feels similar  The pain is dull in character, mild in severity, pain does not radiate and is not associated with numbness  The following portions of the patient's history were reviewed and updated as appropriate: allergies, current medications, past family history, past medical history, past social history, past surgical history and problem list     Review of Systems  Ortho Exam  Past Medical History:   Diagnosis Date    Allergic rhinitis due to pollen     LAST ASSESSED [id number]    Depression     Dysmenorrhea     LAST ASSESSED [id number]    Globus sensation     LAST ASSESSED [id number]    Wrist sprain     LAST ASSESSED [id number]     Past Surgical History:   Procedure Laterality Date    NO PAST SURGERIES       Family History   Problem Relation Age of Onset    Other Mother         IDIOPATHIC THROMBOCYTOPENIC PURPURA    Alcohol abuse Father     Anemia Father     Esophageal cancer Maternal Grandfather     Bone cancer Maternal Grandfather     Multiple sclerosis Paternal Grandfather      Social History     Occupational History    Not on file   Tobacco Use    Smoking status: Never Smoker    Smokeless tobacco: Never Used   Vaping Use    Vaping Use: Never used   Substance and Sexual Activity    Alcohol use:  Yes    Drug use: No    Sexual activity: Yes     Partners: Male     Birth control/protection: Condom Male     Comment: Lo loestrin       Review of Systems   Constitutional: Negative  HENT: Negative  Eyes: Negative  Respiratory: Negative  Cardiovascular: Negative  Gastrointestinal: Negative  Endocrine: Negative  Genitourinary: Negative  Musculoskeletal: As below      Allergic/Immunologic: Negative  Neurological: Negative  Hematological: Negative  Psychiatric/Behavioral: Negative  Objective   Physical Exam        I have personally reviewed pertinent films in PACS and my interpretation is no acute displaced fracture on xray  · Constitutional: Awake, Alert, Oriented  · Eyes: EOMI  · Psych: Mood and affect appropriate  · Heart: regular rate   · Lungs: No audible wheezing  · Abdomen: No guarding  · Lymph: no lymphedema             right ankle:  - Appearance   No swelling, discoloration, deformity, or ecchymosis  - Palpation   No tenderness about the medial / lateral malleoli, deltoid, proximal fibula, atf, cf, ptf, talus, achilles or 5th MT   + tenderness of the anterior ankle over the talus  - ROM   Full, active ROM    Pain with dorsiflexion  - Special Tests   Negative anterior drawer  - Motor   normal 5/5 in all planes  - NVI distally

## 2022-04-25 ENCOUNTER — OFFICE VISIT (OUTPATIENT)
Dept: FAMILY MEDICINE CLINIC | Facility: CLINIC | Age: 27
End: 2022-04-25
Payer: COMMERCIAL

## 2022-04-25 VITALS
DIASTOLIC BLOOD PRESSURE: 90 MMHG | HEIGHT: 65 IN | WEIGHT: 163.4 LBS | TEMPERATURE: 96.9 F | SYSTOLIC BLOOD PRESSURE: 140 MMHG | OXYGEN SATURATION: 100 % | RESPIRATION RATE: 12 BRPM | HEART RATE: 66 BPM | BODY MASS INDEX: 27.22 KG/M2

## 2022-04-25 DIAGNOSIS — F41.8 DEPRESSION WITH ANXIETY: Primary | ICD-10-CM

## 2022-04-25 PROCEDURE — 99213 OFFICE O/P EST LOW 20 MIN: CPT | Performed by: FAMILY MEDICINE

## 2022-04-25 RX ORDER — ESCITALOPRAM OXALATE 20 MG/1
20 TABLET ORAL DAILY
Qty: 90 TABLET | Refills: 1 | Status: SHIPPED | OUTPATIENT
Start: 2022-04-25 | End: 2022-06-29

## 2022-04-25 NOTE — PROGRESS NOTES
FAMILY PRACTICE OFFICE VISIT       NAME: Matthew Gabriel  AGE: 27 y o  SEX: female       : 1995        MRN: [id number]    DATE: 2022  TIME: 7:17 PM    Assessment and Plan   1  Depression with anxiety  Comments:  Pt stable - Increase daily Escitalopram to 20mg now  Disc considering an online counseling resource ([employer], etc), given schedule  Orders:  -     escitalopram (LEXAPRO) 20 mg tablet; Take 1 tablet (20 mg total) by mouth daily         There are no Patient Instructions on file for this visit  Chief Complaint     Chief Complaint   Patient presents with    Follow-up     patient being seen for 2 month follow up        History of Present Illness   Matthew Gabriel is a 27y o -year-old female who presents in f/u  Mood is tough for her right now - marriage struggling right now, there are worries about fertility (had been working with [facility]), etc   No HI/SI  Review of Systems   Review of Systems   Constitutional: Negative for activity change  Psychiatric/Behavioral: Positive for dysphoric mood  Negative for suicidal ideas  The patient is nervous/anxious  No HI/SI         Active Problem List     Patient Active Problem List   Diagnosis    Depression with anxiety    Insomnia    Motion sickness    Anxiety    Family history of primary ovarian failure    Mild major depression ([facility] [address] )         Past Medical History:  Past Medical History:   Diagnosis Date    Allergic rhinitis due to pollen     LAST ASSESSED [id number]    Anxiety     Depression     Dysmenorrhea     LAST ASSESSED [id number]    Globus sensation     LAST ASSESSED [id number]    Wrist sprain     LAST ASSESSED [id number]       Past Surgical History:  Past Surgical History:   Procedure Laterality Date    NO PAST SURGERIES         Family History:  Family History   Problem Relation Age of Onset    Other Mother         IDIOPATHIC THROMBOCYTOPENIC PURPURA    Alcohol abuse Father     Anemia Father    Kelby Daiana Esophageal cancer Maternal Grandfather     Bone cancer Maternal Grandfather     Cancer Maternal Grandfather     Multiple sclerosis Paternal Grandfather        Social History:  Social History     Socioeconomic History    Marital status: /Civil Union     Spouse name: Not on file    Number of children: Not on file    Years of education: Not on file    Highest education level: Not on file   Occupational History    Not on file   Tobacco Use    Smoking status: Never Smoker    Smokeless tobacco: Never Used   Vaping Use    Vaping Use: Never used   Substance and Sexual Activity    Alcohol use: Yes     Alcohol/week: 1 0 standard drink     Types: 1 Glasses of wine per week    Drug use: No    Sexual activity: Yes     Partners: Male     Birth control/protection: Condom Male     Comment: Lo loestrin   Other Topics Concern    Not on file   Social History Narrative    CAFFEINE USE    DAILY COFFEE     Social Determinants of Health     Financial Resource Strain: Not on file   Food Insecurity: Not on file   Transportation Needs: Not on file   Physical Activity: Not on file   Stress: Not on file   Social Connections: Not on file   Intimate Partner Violence: Not on file   Housing Stability: Not on file       Objective     Vitals:    04/25/22 1720   BP: 140/90   Pulse: 66   Resp: 12   Temp: (!) 96 9 °F (36 1 °C)   SpO2: 100%     Wt Readings from Last 3 Encounters:   04/25/22 74 1 kg (163 lb 6 4 oz)   04/13/22 73 8 kg (162 lb 12 8 oz)   03/21/22 73 7 kg (162 lb 6 4 oz)       Physical Exam  Vitals and nursing note reviewed  Constitutional:       General: She is not in acute distress  Appearance: Normal appearance  She is not ill-appearing, toxic-appearing or diaphoretic  HENT:      Head: Normocephalic and atraumatic  Eyes:      General: No scleral icterus  Conjunctiva/sclera: Conjunctivae normal    Neurological:      Mental Status: She is alert  Psychiatric:         Mood and Affect: Mood is depressed  Affect is tearful  Speech: Speech normal          Behavior: Behavior normal          Thought Content: Thought content normal          Judgment: Judgment normal          Pertinent Laboratory/Diagnostic Studies:  Lab Results   Component Value Date    BUN 11 03/14/2020    CREATININE 0 99 03/14/2020    CALCIUM 9 0 03/14/2020    K 4 1 03/14/2020    CO2 25 03/14/2020     03/14/2020     Lab Results   Component Value Date    ALT 35 03/14/2020    AST 21 03/14/2020    ALKPHOS 60 03/14/2020       Lab Results   Component Value Date    WBC 4 76 03/14/2020    HGB 15 7 (H) 03/14/2020    HCT 47 4 (H) 03/14/2020    MCV 92 03/14/2020     03/14/2020       No results found for: TSH    No results found for: CHOL  Lab Results   Component Value Date    TRIG 61 03/14/2020     Lab Results   Component Value Date    HDL 54 03/14/2020     Lab Results   Component Value Date    LDLCALC 101 (H) 03/14/2020     No results found for: HGBA1C    Results for orders placed or performed in visit on 03/21/22   Liquid-based pap, screening   Result Value Ref Range    Case Report       Gynecologic Cytology Report                       Case: [id number]                                  Authorizing Provider:  Sangeeta Fung PA-C         Collected:           03/21/2022 1259              Ordering Location:     [facility]      Received:            03/21/2022 1259                                     [facility]                                                                          First Screen:          Douglas Cap, CT                                                    Specimen:    LIQUID-BASED PAP, SCREENING, Cervix                                                        Primary Interpretation Negative for intraepithelial lesion or malignancy     Specimen Adequacy       Satisfactory for evaluation   Endocervical/transformation zone component present  Additional Information       [employer]'s FDA approved ,  and ThinPrep Imaging Duo System are utilized with strict adherence to the 's instruction manual to prepare gynecologic and non-gynecologic cytology specimens for the production of ThinPrep slides as well as for gynecologic ThinPrep imaging  These processes have been validated by our laboratory and/or by the   The Pap test is not a diagnostic procedure and should not be used as the sole means to detect cervical cancer  It is only a screening procedure to aid in the detection of cervical cancer and its precursors  Both false-negative and false-positive results have been experienced  Your patient's test result should be interpreted in this context together with the history and clinical findings  No orders of the defined types were placed in this encounter  ALLERGIES:  Allergies   Allergen Reactions    Fruit C [Ascorbate - Food Allergy]      Fruit- stomach pains     Pollen Extract Allergic Rhinitis       Current Medications     Current Outpatient Medications   Medication Sig Dispense Refill    escitalopram (LEXAPRO) 20 mg tablet Take 1 tablet (20 mg total) by mouth daily 90 tablet 1    norgestrel-ethinyl estradiol (Cryselle-28) 0 3 mg-30 mcg per tablet Take 1 tablet by mouth daily 84 tablet 4     No current facility-administered medications for this visit           Health Maintenance     Health Maintenance   Topic Date Due    COVID-19 Vaccine (1) Never done    HIV Screening  Never done    HPV Vaccine (3 - 3-dose series) 09/27/2014    Chlamydia Screening  04/16/2019    Influenza Vaccine (1) 09/01/2021    BMI: Followup Plan  01/19/2023    Annual Physical  01/19/2023    BMI: Adult  04/25/2023    Depression Remission PHQ  04/25/2023    Cervical Cancer Screening  03/21/2025    DTaP,Tdap,and Td Vaccines (5 - Td or Tdap) 12/27/2027    Hepatitis C Screening  Completed    HIB Vaccine Completed    Hepatitis B Vaccine  Completed    IPV Vaccine  Completed    Pneumococcal Vaccine: Pediatrics (0 to 5 Years) and At-Risk Patients (6 to 59 Years)  Aged Out    Hepatitis A Vaccine  Aged Out    Meningococcal ACWY Vaccine  Aged [employer] History   Administered Date(s) Administered    DTaP 5 1997, 1997, 1997, 07/16/1998, 03/12/2002    HPV Quadrivalent 02/10/2014, 04/09/2014, 05/27/2014    Hep B, adult 1997, 1997, 1997, 03/15/2018    Hib (PRP-OMP) 1997, 1997, 1997, 07/16/1998    INFLUENZA 12/27/2017    IPV 1997, 1997, 06/19/1998, 03/12/2002    Influenza Quadrivalent Preservative Free 3 years and older IM 12/27/2017    MMR 06/19/1998, 03/12/2002    Meningococcal, Unknown Serogroups 09/09/2008    Tdap 09/09/2008, 12/27/2017    Varicella 07/16/1998, 09/09/2008          Efren Jones DO

## 2022-05-12 ENCOUNTER — HOSPITAL ENCOUNTER (OUTPATIENT)
Dept: RADIOLOGY | Age: 27
Discharge: HOME | End: 2022-05-12
Payer: COMMERCIAL

## 2022-05-12 DIAGNOSIS — M79.671 PAIN IN RIGHT FOOT: ICD-10-CM

## 2022-05-12 DIAGNOSIS — S89.80XA OVERUSE INJURY OF LOWER LEG: ICD-10-CM

## 2022-05-12 PROCEDURE — G1004 CDSM NDSC: HCPCS

## 2022-05-12 PROCEDURE — 73721 MRI JNT OF LWR EXTRE W/O DYE: CPT

## 2022-06-01 ENCOUNTER — TELEPHONE (OUTPATIENT)
Dept: PSYCHIATRY | Facility: CLINIC | Age: 27
End: 2022-06-01

## 2022-06-01 NOTE — TELEPHONE ENCOUNTER
Behavorial Health Outpatient Intake Questions    Referred by: Self    Please advised interviewee that they need to answer all questions truthfully to allow for best care and any misrepresentations of information may affect their ability to be seen at this clinic   => Was this discussed? Yes     Behavorial Health Outpatient Intake History -     Presenting Problem (in patient's words):     Been having life issues  Recently founf out she can't have kids  Is cauing     Are there any developmental disabilities? ? If yes, can they speak to you on the phone? If they are too limited to speak to you on phone, refer out No    Are you taking any psychiatric medications? Yes    => If yes, who prescribes? If yes, are they injectable medications? Lexapro=PCP    Does the patient have a language barrier or hearing impairment? No    Have you been treated at [address] [place] [address] by a therapist or a doctor in the past? If yes, who? No    Has the patient been hospitalized for mental health? No   If yes, how long ago was last hospitalization and where was it? Do you actively use alcohol or marijuana or illegal substances? If yes, what and how much - refer out to Drug and alcohol treatment if use is excessive or daily use of illegal substances No concerns of substance abuse are reported  Do you have a community treatment team or ? No    Legal History-     Does the patient have any history of arrests, penitentiary/group home time, or DUIs? No  If Yes-  1) What types of charges? 2) When were they last incarcerated? 3) Are they currently on parole or probation? Minor Child-    Who has custody of the child? Is there a custody agreement? If there is a custody agreement remind parent that they must bring a copy to the first appt or they will not be seen       Intake Team, please check with provider before scheduling if flags come up such as:  - complex case  - legal history (other than DUI)  - communication barrier concerns are present  - if, in your judgment, this needs further review    ACCEPTED as a patient Yes  => Appointment Date: 6/8/2022 with Clau Brown    Referred Elsewhere? No    Name of [facility] Co[id number]  Insurance Phone #  If ins is primary or secondary  If patient is a minor, parents information such as Name, D  O B of guarantor

## 2022-06-06 ENCOUNTER — OFFICE VISIT (OUTPATIENT)
Dept: URGENT CARE | Age: 27
End: 2022-06-06
Payer: COMMERCIAL

## 2022-06-06 VITALS
RESPIRATION RATE: 16 BRPM | HEART RATE: 73 BPM | HEIGHT: 65 IN | DIASTOLIC BLOOD PRESSURE: 67 MMHG | OXYGEN SATURATION: 98 % | SYSTOLIC BLOOD PRESSURE: 153 MMHG | BODY MASS INDEX: 27.32 KG/M2 | TEMPERATURE: 98 F | WEIGHT: 164 LBS

## 2022-06-06 DIAGNOSIS — H66.93 BILATERAL OTITIS MEDIA, UNSPECIFIED OTITIS MEDIA TYPE: Primary | ICD-10-CM

## 2022-06-06 PROCEDURE — 99213 OFFICE O/P EST LOW 20 MIN: CPT | Performed by: NURSE PRACTITIONER

## 2022-06-06 RX ORDER — AMOXICILLIN 875 MG/1
875 TABLET, COATED ORAL 2 TIMES DAILY
Qty: 14 TABLET | Refills: 0 | Status: SHIPPED | OUTPATIENT
Start: 2022-06-06 | End: 2022-06-13

## 2022-06-06 NOTE — PROGRESS NOTES
[facility]        NAME: Jimbo Pate is a 24 y o  female  : 1995    MRN: [id number]  DATE: 2022  TIME: 9:27 AM    Assessment and Plan   Bilateral otitis media, unspecified otitis media type [H66 93]  1  Bilateral otitis media, unspecified otitis media type  amoxicillin (AMOXIL) 875 mg tablet         Patient Instructions     Take med as directed  Hearing should improve with resolution of infection   Follow up with PCP in 3-5 days  Proceed to  ER if symptoms worsen  Chief Complaint     Chief Complaint   Patient presents with    Earache         History of Present Illness       HPI   Reports bilateral ears feel clogged and there is loss of hearing  Started yesterday and has gotten worse with time  No trauma in the ear  No drainage  Review of Systems   Review of Systems   Constitutional: Negative for chills and fever  HENT: Positive for hearing loss and rhinorrhea  Negative for facial swelling and sore throat  Respiratory: Negative for shortness of breath  Cardiovascular: Negative for chest pain  Gastrointestinal: Negative for diarrhea and vomiting  Neurological: Negative for light-headedness and headaches           Current Medications       Current Outpatient Medications:     amoxicillin (AMOXIL) 875 mg tablet, Take 1 tablet (875 mg total) by mouth 2 (two) times a day for 7 days, Disp: 14 tablet, Rfl: 0    escitalopram (LEXAPRO) 20 mg tablet, Take 1 tablet (20 mg total) by mouth daily, Disp: 90 tablet, Rfl: 1    norgestrel-ethinyl estradiol (Cryselle-28) 0 3 mg-30 mcg per tablet, Take 1 tablet by mouth daily, Disp: 84 tablet, Rfl: 4    Current Allergies     Allergies as of 2022 - Reviewed 2022   Allergen Reaction Noted    Fruit c [ascorbate - food allergy]  2019    Pollen extract Allergic Rhinitis 2015            The following portions of the patient's history were reviewed and updated as appropriate: allergies, current medications, past family history, past medical history, past social history, past surgical history and problem list      Past Medical History:   Diagnosis Date    Allergic rhinitis due to pollen     LAST ASSESSED [id number]    Anxiety     Depression     Dysmenorrhea     LAST ASSESSED [id number]    Globus sensation     LAST ASSESSED [id number]    Wrist sprain     LAST ASSESSED [id number]       Past Surgical History:   Procedure Laterality Date    NO PAST SURGERIES         Family History   Problem Relation Age of Onset    Other Mother         IDIOPATHIC THROMBOCYTOPENIC PURPURA    Alcohol abuse Father     Anemia Father     Esophageal cancer Maternal Grandfather     Bone cancer Maternal Grandfather     Cancer Maternal Grandfather     Multiple sclerosis Paternal Grandfather          Medications have been verified  Objective   /67   Pulse 73   Temp 98 °F (36 7 °C)   Resp 16   Ht 5' 5" (1 651 m)   Wt 74 4 kg (164 lb)   SpO2 98%   BMI 27 29 kg/m²   No LMP recorded  Physical Exam     Physical Exam  HENT:      Right Ear: Tympanic membrane is erythematous and bulging  Left Ear: Swelling (mild, proximal ear canal) present  A middle ear effusion is present  Tympanic membrane is erythematous and bulging  Cardiovascular:      Rate and Rhythm: Regular rhythm  Heart sounds: Normal heart sounds  Pulmonary:      Effort: Pulmonary effort is normal       Breath sounds: Normal breath sounds

## 2022-06-15 ENCOUNTER — OFFICE VISIT (OUTPATIENT)
Dept: FAMILY MEDICINE CLINIC | Facility: CLINIC | Age: 27
End: 2022-06-15
Payer: COMMERCIAL

## 2022-06-15 VITALS
DIASTOLIC BLOOD PRESSURE: 90 MMHG | HEART RATE: 67 BPM | RESPIRATION RATE: 12 BRPM | SYSTOLIC BLOOD PRESSURE: 128 MMHG | OXYGEN SATURATION: 100 % | HEIGHT: 65 IN | BODY MASS INDEX: 27.86 KG/M2 | WEIGHT: 167.2 LBS | TEMPERATURE: 97.5 F

## 2022-06-15 DIAGNOSIS — H66.93 BILATERAL OTITIS MEDIA, UNSPECIFIED OTITIS MEDIA TYPE: Primary | ICD-10-CM

## 2022-06-15 DIAGNOSIS — H69.83 EUSTACHIAN TUBE DYSFUNCTION, BILATERAL: ICD-10-CM

## 2022-06-15 PROCEDURE — 99213 OFFICE O/P EST LOW 20 MIN: CPT | Performed by: FAMILY MEDICINE

## 2022-06-15 NOTE — PROGRESS NOTES
FAMILY PRACTICE OFFICE VISIT       NAME: Nazario Singletary  AGE: 24 y o  SEX: female       : 1995        MRN: [id number]    DATE: 6/15/2022  TIME: 12:31 PM    Assessment and Plan   1  Bilateral otitis media, unspecified otitis media type  Comments:  Pt stable on exam - I did not see pt originally, but it does appear that any sign of AOM b/l has cleared after the Amoxicillin  2  Eustachian tube dysfunction, bilateral  Comments:  Likely causing her decreased hearing b/l -> treat with at least 2 weeks of OTC Flonase nasal spray QD, with OTC decongestants PRN  There are no Patient Instructions on file for this visit  Chief Complaint     Chief Complaint   Patient presents with    Follow-up     Patient being seen for UC follow up- Double ear infection, not getting better       History of Present Illness   Nazario Singletary is a 24y o -year-old female who presents in f/u from trip to [facility] for bilateral ear pain and hearing loss  Records reviewed  Was treated with Amoxicillin for b/l Otitis media  Ear pain is better, but still cannot hear -> L better than R  Review of Systems   Review of Systems   Constitutional: Negative for activity change and fever  HENT: Positive for congestion and hearing loss  Negative for ear pain and sinus pain  Congestion is better  Respiratory: Negative for cough          Active Problem List     Patient Active Problem List   Diagnosis    Depression with anxiety    Insomnia    Motion sickness    Anxiety    Family history of primary ovarian failure    Mild major depression ([address] )         Past Medical History:  Past Medical History:   Diagnosis Date    Allergic rhinitis due to pollen     LAST ASSESSED [id number]    Anxiety     Depression     Dysmenorrhea     LAST ASSESSED [id number]    Globus sensation     LAST ASSESSED [id number]    Wrist sprain     LAST ASSESSED [id number]       Past Surgical History:  Past Surgical History:   Procedure Laterality Date    NO PAST SURGERIES         Family History:  Family History   Problem Relation Age of Onset    Other Mother         IDIOPATHIC THROMBOCYTOPENIC PURPURA    Alcohol abuse Father     Anemia Father     Esophageal cancer Maternal Grandfather     Bone cancer Maternal Grandfather     Cancer Maternal Grandfather     Multiple sclerosis Paternal Grandfather        Social History:  Social History     Socioeconomic History    Marital status: /Civil Union     Spouse name: Not on file    Number of children: Not on file    Years of education: Not on file    Highest education level: Not on file   Occupational History    Not on file   Tobacco Use    Smoking status: Never Smoker    Smokeless tobacco: Never Used   Vaping Use    Vaping Use: Never used   Substance and Sexual Activity    Alcohol use: Yes     Alcohol/week: 1 0 standard drink     Types: 1 Glasses of wine per week    Drug use: No    Sexual activity: Yes     Partners: Male     Birth control/protection: Condom Male     Comment: Lo loestrin   Other Topics Concern    Not on file   Social History Narrative    CAFFEINE USE    DAILY COFFEE     Social Determinants of Health     Financial Resource Strain: Not on file   Food Insecurity: Not on file   Transportation Needs: Not on file   Physical Activity: Not on file   Stress: Not on file   Social Connections: Not on file   Intimate Partner Violence: Not on file   Housing Stability: Not on file       Objective     Vitals:    06/15/22 1023   BP: 128/90   Pulse: 67   Resp: 12   Temp: 97 5 °F (36 4 °C)   SpO2: 100%     Wt Readings from Last 3 Encounters:   06/15/22 75 8 kg (167 lb 3 2 oz)   06/06/22 74 4 kg (164 lb)   04/25/22 74 1 kg (163 lb 6 4 oz)       Physical Exam  Vitals reviewed  Constitutional:       General: She is not in acute distress  Appearance: Normal appearance  She is not ill-appearing, toxic-appearing or diaphoretic  HENT:      Head: Normocephalic and atraumatic  Right Ear: Tympanic membrane, ear canal and external ear normal  There is no impacted cerumen  Left Ear: Tympanic membrane, ear canal and external ear normal  There is no impacted cerumen  Eyes:      General: No scleral icterus  Conjunctiva/sclera: Conjunctivae normal    Musculoskeletal:      Cervical back: Normal range of motion and neck supple  No rigidity or tenderness  Lymphadenopathy:      Cervical: No cervical adenopathy  Neurological:      Mental Status: She is alert and oriented to person, place, and time  Psychiatric:         Mood and Affect: Mood normal          Behavior: Behavior normal          Thought Content:  Thought content normal          Judgment: Judgment normal          Pertinent Laboratory/Diagnostic Studies:  Lab Results   Component Value Date    BUN 11 03/14/2020    CREATININE 0 99 03/14/2020    CALCIUM 9 0 03/14/2020    K 4 1 03/14/2020    CO2 25 03/14/2020     03/14/2020     Lab Results   Component Value Date    ALT 35 03/14/2020    AST 21 03/14/2020    ALKPHOS 60 03/14/2020       Lab Results   Component Value Date    WBC 4 76 03/14/2020    HGB 15 7 (H) 03/14/2020    HCT 47 4 (H) 03/14/2020    MCV 92 03/14/2020     03/14/2020       No results found for: TSH    No results found for: CHOL  Lab Results   Component Value Date    TRIG 61 03/14/2020     Lab Results   Component Value Date    HDL 54 03/14/2020     Lab Results   Component Value Date    LDLCALC 101 (H) 03/14/2020     No results found for: HGBA1C    Results for orders placed or performed in visit on 03/21/22   Liquid-based pap, screening   Result Value Ref Range    Case Report       Gynecologic Cytology Report                       Case: [id number]                                  Authorizing Provider:  Raffi Calvo PA-C         Collected:           03/21/2022 1259              Ordering Location:     [facility]      Received:            03/21/2022 1259 [facility]                                                                          First Screen:          IRINA Hoffman                                                    Specimen:    LIQUID-BASED PAP, SCREENING, Cervix                                                        Primary Interpretation Negative for intraepithelial lesion or malignancy     Specimen Adequacy       Satisfactory for evaluation  Endocervical/transformation zone component present  Additional Information       [employer]'s FDA approved ,  and ThinPrep Imaging Duo System are utilized with strict adherence to the 's instruction manual to prepare gynecologic and non-gynecologic cytology specimens for the production of ThinPrep slides as well as for gynecologic ThinPrep imaging  These processes have been validated by our laboratory and/or by the   The Pap test is not a diagnostic procedure and should not be used as the sole means to detect cervical cancer  It is only a screening procedure to aid in the detection of cervical cancer and its precursors  Both false-negative and false-positive results have been experienced  Your patient's test result should be interpreted in this context together with the history and clinical findings  No orders of the defined types were placed in this encounter  ALLERGIES:  Allergies   Allergen Reactions    Fruit C [Ascorbate - Food Allergy]      Fruit- stomach pains     Pollen Extract Allergic Rhinitis       Current Medications     Current Outpatient Medications   Medication Sig Dispense Refill    escitalopram (LEXAPRO) 20 mg tablet Take 1 tablet (20 mg total) by mouth daily 90 tablet 1    norgestrel-ethinyl estradiol (Cryselle-28) 0 3 mg-30 mcg per tablet Take 1 tablet by mouth daily 84 tablet 4     No current facility-administered medications for this visit  Health Maintenance     Health Maintenance   Topic Date Due    COVID-19 Vaccine (1) Never done    HIV Screening  Never done    HPV Vaccine (3 - 3-dose series) 09/27/2014    Influenza Vaccine (Season Ended) 09/01/2022    BMI: Followup Plan  01/19/2023    Annual Physical  01/19/2023    Depression Remission PHQ  04/25/2023    BMI: Adult  06/15/2023    Cervical Cancer Screening  03/21/2025    DTaP,Tdap,and Td Vaccines (5 - Td or Tdap) 12/27/2027    Hepatitis C Screening  Completed    HIB Vaccine  Completed    Hepatitis B Vaccine  Completed    IPV Vaccine  Completed    Pneumococcal Vaccine: Pediatrics (0 to 5 Years) and At-Risk Patients (6 to 59 Years)  Aged Out    Hepatitis A Vaccine  Aged Out    Meningococcal ACWY Vaccine  Aged [employer] History   Administered Date(s) Administered    DTaP 5 1997, 1997, 1997, 07/16/1998, 03/12/2002    HPV Quadrivalent 02/10/2014, 04/09/2014, 05/27/2014    Hep B, adult 1997, 1997, 1997, 03/15/2018    Hib (PRP-OMP) 1997, 1997, 1997, 07/16/1998    INFLUENZA 12/27/2017    IPV 1997, 1997, 06/19/1998, 03/12/2002    Influenza Quadrivalent Preservative Free 3 years and older IM 12/27/2017    MMR 06/19/1998, 03/12/2002    Meningococcal, Unknown Serogroups 09/09/2008    Tdap 09/09/2008, 12/27/2017    Varicella 07/16/1998, 09/09/2008          Efren Jones DO

## 2022-06-29 ENCOUNTER — OFFICE VISIT (OUTPATIENT)
Dept: FAMILY MEDICINE CLINIC | Facility: CLINIC | Age: 27
End: 2022-06-29
Payer: COMMERCIAL

## 2022-06-29 ENCOUNTER — SOCIAL WORK (OUTPATIENT)
Dept: BEHAVIORAL/MENTAL HEALTH CLINIC | Facility: CLINIC | Age: 27
End: 2022-06-29
Payer: COMMERCIAL

## 2022-06-29 VITALS
HEIGHT: 65 IN | TEMPERATURE: 99.6 F | DIASTOLIC BLOOD PRESSURE: 82 MMHG | WEIGHT: 170.2 LBS | HEART RATE: 64 BPM | BODY MASS INDEX: 28.36 KG/M2 | RESPIRATION RATE: 12 BRPM | SYSTOLIC BLOOD PRESSURE: 124 MMHG | OXYGEN SATURATION: 100 %

## 2022-06-29 DIAGNOSIS — F32.0 MILD MAJOR DEPRESSION (HCC): Primary | ICD-10-CM

## 2022-06-29 DIAGNOSIS — F41.8 DEPRESSION WITH ANXIETY: Primary | ICD-10-CM

## 2022-06-29 PROCEDURE — 90791 PSYCH DIAGNOSTIC EVALUATION: CPT | Performed by: COUNSELOR

## 2022-06-29 PROCEDURE — 99213 OFFICE O/P EST LOW 20 MIN: CPT | Performed by: FAMILY MEDICINE

## 2022-06-29 RX ORDER — ESCITALOPRAM OXALATE 10 MG/1
10 TABLET ORAL DAILY
Qty: 90 TABLET | Refills: 1 | Status: SHIPPED | OUTPATIENT
Start: 2022-06-29

## 2022-06-29 NOTE — PROGRESS NOTES
FAMILY PRACTICE OFFICE VISIT       NAME: Rosa Isela Ambriz  AGE: 24 y o  SEX: female       : 1995        MRN: [id number]    DATE: 2022  TIME: 5:10 PM    Assessment and Plan   1  Depression with anxiety  Comments:  Pt stable  Discussed - drop Escitalopram to 10mg again; add Rexulti - disc potential R/SE of med  Has started seeing Behavioral Health  Orders:  -     escitalopram (LEXAPRO) 10 mg tablet; Take 1 tablet (10 mg total) by mouth daily  -     Brexpiprazole (Rexulti) 1 MG tablet; Take 1 tablet (1 mg total) by mouth daily         There are no Patient Instructions on file for this visit  Chief Complaint     Chief Complaint   Patient presents with    Follow-up     Patient being seen for follow up on medication       History of Present Illness   Rosa Isela Ambriz is a 24y o -year-old female who presents in f/u today  At her last OV in 2022, her Escitalopram dose was increased to 20mg QD  Sleep may have worsened with the new dose, mood is more variable, and still struggling to lose weight  Pt is hesitant to stop Escitalopram completely, as overall, it has helped her over the years  The addition of Wellbutrin in the past she did not tolerate, and did see wt gain with adding Abilify prior  Review of Systems   Review of Systems   Constitutional: Negative for activity change  Psychiatric/Behavioral: Positive for dysphoric mood  Negative for suicidal ideas  The patient is nervous/anxious  No HI/SI         Active Problem List     Patient Active Problem List   Diagnosis    Depression with anxiety    Insomnia    Motion sickness    Anxiety    Family history of primary ovarian failure    Mild major depression ([address] )         Past Medical History:  Past Medical History:   Diagnosis Date    Allergic rhinitis due to pollen     LAST ASSESSED [id number]    Anxiety     Depression     Dysmenorrhea     LAST ASSESSED [id number]    Globus sensation     LAST ASSESSED [id number]    Wrist sprain     LAST ASSESSED [id number]       Past Surgical History:  Past Surgical History:   Procedure Laterality Date    NO PAST SURGERIES         Family History:  Family History   Problem Relation Age of Onset    Other Mother         IDIOPATHIC THROMBOCYTOPENIC PURPURA    Alcohol abuse Father     Anemia Father     Esophageal cancer Maternal Grandfather     Bone cancer Maternal Grandfather     Cancer Maternal Grandfather     Multiple sclerosis Paternal Grandfather        Social History:  Social History     Socioeconomic History    Marital status: /Civil Union     Spouse name: Not on file    Number of children: Not on file    Years of education: Not on file    Highest education level: Not on file   Occupational History    Not on file   Tobacco Use    Smoking status: Never Smoker    Smokeless tobacco: Never Used   Vaping Use    Vaping Use: Never used   Substance and Sexual Activity    Alcohol use: Yes     Alcohol/week: 1 0 standard drink     Types: 1 Glasses of wine per week    Drug use: No    Sexual activity: Yes     Partners: Male     Birth control/protection: Condom Male     Comment: Lo loestrin   Other Topics Concern    Not on file   Social History Narrative    CAFFEINE USE    DAILY COFFEE     Social Determinants of Health     Financial Resource Strain: Not on file   Food Insecurity: Not on file   Transportation Needs: Not on file   Physical Activity: Not on file   Stress: Not on file   Social Connections: Not on file   Intimate Partner Violence: Not on file   Housing Stability: Not on file       Objective     Vitals:    06/29/22 1632   BP: 124/82   Pulse: 64   Resp: 12   Temp: 99 6 °F (37 6 °C)   SpO2: 100%     Wt Readings from Last 3 Encounters:   06/29/22 77 2 kg (170 lb 3 2 oz)   06/15/22 75 8 kg (167 lb 3 2 oz)   06/06/22 74 4 kg (164 lb)       Physical Exam  Vitals and nursing note reviewed  Constitutional:       General: She is not in acute distress       Appearance: Normal appearance  She is not ill-appearing, toxic-appearing or diaphoretic  HENT:      Head: Normocephalic and atraumatic  Eyes:      General: No scleral icterus  Conjunctiva/sclera: Conjunctivae normal    Neurological:      Mental Status: She is alert and oriented to person, place, and time  Psychiatric:         Mood and Affect: Mood normal          Behavior: Behavior normal          Thought Content:  Thought content normal          Judgment: Judgment normal          Pertinent Laboratory/Diagnostic Studies:  Lab Results   Component Value Date    BUN 11 03/14/2020    CREATININE 0 99 03/14/2020    CALCIUM 9 0 03/14/2020    K 4 1 03/14/2020    CO2 25 03/14/2020     03/14/2020     Lab Results   Component Value Date    ALT 35 03/14/2020    AST 21 03/14/2020    ALKPHOS 60 03/14/2020       Lab Results   Component Value Date    WBC 4 76 03/14/2020    HGB 15 7 (H) 03/14/2020    HCT 47 4 (H) 03/14/2020    MCV 92 03/14/2020     03/14/2020       No results found for: TSH    No results found for: CHOL  Lab Results   Component Value Date    TRIG 61 03/14/2020     Lab Results   Component Value Date    HDL 54 03/14/2020     Lab Results   Component Value Date    LDLCALC 101 (H) 03/14/2020     No results found for: HGBA1C    Results for orders placed or performed in visit on 03/21/22   Liquid-based pap, screening   Result Value Ref Range    Case Report       Gynecologic Cytology Report                       Case: [id number]                                  Authorizing Provider:  Juanita Gomez PA-C         Collected:           03/21/2022 1259              Ordering Location:     [facility]      Received:            03/21/2022 [address] [place] [address]                                     [facility]                                                                          First Screen:          Peyton Bryan, Zinkia[employer] Multimedia Plus | QuizScore Specimen:    LIQUID-BASED PAP, SCREENING, Cervix                                                        Primary Interpretation Negative for intraepithelial lesion or malignancy     Specimen Adequacy       Satisfactory for evaluation  Endocervical/transformation zone component present  Additional Information       SNTMNT's FDA approved ,  and ThinPrep Imaging Duo System are utilized with strict adherence to the 's instruction manual to prepare gynecologic and non-gynecologic cytology specimens for the production of ThinPrep slides as well as for gynecologic ThinPrep imaging  These processes have been validated by our laboratory and/or by the   The Pap test is not a diagnostic procedure and should not be used as the sole means to detect cervical cancer  It is only a screening procedure to aid in the detection of cervical cancer and its precursors  Both false-negative and false-positive results have been experienced  Your patient's test result should be interpreted in this context together with the history and clinical findings  No orders of the defined types were placed in this encounter  ALLERGIES:  Allergies   Allergen Reactions    Fruit C [Ascorbate - Food Allergy]      Fruit- stomach pains     Pollen Extract Allergic Rhinitis       Current Medications     Current Outpatient Medications   Medication Sig Dispense Refill    Brexpiprazole (Rexulti) 1 MG tablet Take 1 tablet (1 mg total) by mouth daily 90 tablet 1    escitalopram (LEXAPRO) 10 mg tablet Take 1 tablet (10 mg total) by mouth daily 90 tablet 1    norgestrel-ethinyl estradiol (Cryselle-28) 0 3 mg-30 mcg per tablet Take 1 tablet by mouth daily 84 tablet 4     No current facility-administered medications for this visit           Health Maintenance     Health Maintenance   Topic Date Due    COVID-19 Vaccine (1) Never done    HIV Screening  Never done    HPV Vaccine (3 - 3-dose series) 09/27/2014    Influenza Vaccine (Season Ended) 09/01/2022    BMI: Followup Plan  01/19/2023    Annual Physical  01/19/2023    BMI: Adult  06/29/2023    Depression Remission PHQ  06/29/2023    Cervical Cancer Screening  03/21/2025    DTaP,Tdap,and Td Vaccines (5 - Td or Tdap) 12/27/2027    Hepatitis C Screening  Completed    HIB Vaccine  Completed    Hepatitis B Vaccine  Completed    IPV Vaccine  Completed    Pneumococcal Vaccine: Pediatrics (0 to 5 Years) and At-Risk Patients (6 to 59 Years)  Aged Out    Hepatitis A Vaccine  Aged Out    Meningococcal ACWY Vaccine  Aged [employer] History   Administered Date(s) Administered    DTaP 5 1997, 1997, 1997, 07/16/1998, 03/12/2002    HPV Quadrivalent 02/10/2014, 04/09/2014, 05/27/2014    Hep B, adult 1997, 1997, 1997, 03/15/2018    Hib (PRP-OMP) 1997, 1997, 1997, 07/16/1998    INFLUENZA 12/27/2017    IPV 1997, 1997, 06/19/1998, 03/12/2002    Influenza Quadrivalent Preservative Free 3 years and older IM 12/27/2017    MMR 06/19/1998, 03/12/2002    Meningococcal, Unknown Serogroups 09/09/2008    Tdap 09/09/2008, 12/27/2017    Varicella 07/16/1998, 09/09/2008          Efren Jones DO

## 2022-06-29 NOTE — BH TREATMENT PLAN
[facility]  1995       Date of Initial Treatment Plan: 6/29/2022  Date of Current Treatment Plan: 06/30/22    Treatment Plan Number 1    Strengths/Personal Resources for Self Care: wineries, camping, fishing, spend time with dogs  Diagnosis:   1  Mild major depression ([address] )         Area of Needs: process through her past and improve herself      Long Term Goal 1: Robert will process through her past and move forward     Target Date: 12/26/2022  Completion Date: TBD         Short Term Objectives for Goal 1:    A: Robert will discuss her past through talk therapist and CBT   B: Robert will identify her feelings regarding her past   C: Robert will develop cognitive restructuring skills in order to move on from her past    Long Term Goal 2: Robert will improve herself and how she acts towards others    Target Date: 12/26/2022  Completion Date: TBD    Short Term Objectives for Goal 2:    A: Robert will utilize talk therapy and CBT   B: Robert will identify what she likes about herself   C: Robert will identify how she would like to treat others   D: Robert will work on empowering herself    GOAL 1: Modality: Individual 2x per month   Completion Date TBD and The person(s) responsible for carrying out the plan is  Ayla    GOAL 2: Modality: Individual 2x per month   Completion Date TBD and The person(s) responsible for carrying out the plan is  [place] [facility]: Diagnosis and Treatment Plan explained to Robert Jones relates understanding diagnosis and is agreeable to Treatment Plan  Client Comments : Please share your thoughts, feelings, need and/or experiences regarding your treatment plan: She would like to be able to move on from her past, she thinks about it every day  She would like to be a better person, she can be mean and heartless
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
## 2022-07-14 ENCOUNTER — TELEPHONE (OUTPATIENT)
Dept: FAMILY MEDICINE CLINIC | Facility: CLINIC | Age: 27
End: 2022-07-14

## 2022-07-14 NOTE — TELEPHONE ENCOUNTER
LM for patient to call office to inform her prior auth  For Rexulti was approved  I also called the pharmacy to inform them and to resubmit it  Per pharmacy staff it will still cost the patient over $700 out of pocket  Pharmacy staff informed me to tell patient to go to the [employer] web site and sign up for the Savings Card   After she receives her card she is to call the pharmacy at [phone] to inform them she has the card so they can run the medication through again

## 2022-07-19 ENCOUNTER — SOCIAL WORK (OUTPATIENT)
Dept: BEHAVIORAL/MENTAL HEALTH CLINIC | Facility: CLINIC | Age: 27
End: 2022-07-19
Payer: COMMERCIAL

## 2022-07-19 DIAGNOSIS — F41.9 ANXIETY: ICD-10-CM

## 2022-07-19 DIAGNOSIS — F32.0 MILD MAJOR DEPRESSION (HCC): Primary | ICD-10-CM

## 2022-07-19 PROCEDURE — 90834 PSYTX W PT 45 MINUTES: CPT | Performed by: COUNSELOR
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
## 2022-08-04 ENCOUNTER — SOCIAL WORK (OUTPATIENT)
Dept: BEHAVIORAL/MENTAL HEALTH CLINIC | Facility: CLINIC | Age: 27
End: 2022-08-04
Payer: COMMERCIAL

## 2022-08-04 DIAGNOSIS — F41.9 ANXIETY: ICD-10-CM

## 2022-08-04 DIAGNOSIS — F32.0 MILD MAJOR DEPRESSION (HCC): Primary | ICD-10-CM

## 2022-08-04 PROCEDURE — 90834 PSYTX W PT 45 MINUTES: CPT | Performed by: COUNSELOR
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
## 2022-08-09 ENCOUNTER — TELEPHONE (OUTPATIENT)
Dept: PSYCHIATRY | Facility: CLINIC | Age: 27
End: 2022-08-09

## 2022-08-09 NOTE — TELEPHONE ENCOUNTER
Behavorial Health Outpatient Intake Questions    Referred by: Self     Please advised interviewee that they need to answer all questions truthfully to allow for best care and any misrepresentations of information may affect their ability to be seen at this clinic   => Was this discussed? Yes     [facility] -     Presenting Problem (in patient's words): Anxiety, depression   Are there any developmental disabilities? ? If yes, can they speak to you on the phone? If they are too limited to speak to you on phone, refer out No    Are you taking any psychiatric medications? No    => If yes, who prescribes? If yes, are they injectable medications? Does the patient have a language barrier or hearing impairment? No    Have you been treated at [facility] by a therapist or a doctor in the past? If yes, who? Yes, PCP    Has the patient been hospitalized for mental health? No   If yes, how long ago was last hospitalization and where was it? Do you actively use alcohol or marijuana or illegal substances? If yes, what and how much - refer out to Drug and alcohol treatment if use is excessive or daily use of illegal substances No concerns of substance abuse are reported  Do you have a community treatment team or ? No    Legal History-     Does the patient have any history of arrests, halfway/FPC time, or DUIs? No  If Yes-  1) What types of charges? 2) When were they last incarcerated? 3) Are they currently on parole or probation? Minor Child-    Who has custody of the child? Is there a custody agreement? If there is a custody agreement remind parent that they must bring a copy to the first appt or they will not be seen       Intake Team, please check with provider before scheduling if flags come up such as:  - complex case  - legal history (other than DUI)  - communication barrier concerns are present  - if, in your judgment, this needs further review    ACCEPTED as a patient Yes  => Appointment Date: August 25, 2022 at 4:00pm with Dr Katie Nelson? No    Name of Insurance Co: [address] [place] [address] ID# [id number]  Insurance Phone #  If ins is primary or secondary  If patient is a minor, parents information such as Name, D  O B of guarantor

## 2022-08-18 ENCOUNTER — SOCIAL WORK (OUTPATIENT)
Dept: BEHAVIORAL/MENTAL HEALTH CLINIC | Facility: CLINIC | Age: 27
End: 2022-08-18
Payer: COMMERCIAL

## 2022-08-18 DIAGNOSIS — F41.9 ANXIETY: ICD-10-CM

## 2022-08-18 DIAGNOSIS — F32.0 MILD MAJOR DEPRESSION (HCC): Primary | ICD-10-CM

## 2022-08-18 PROCEDURE — 90834 PSYTX W PT 45 MINUTES: CPT | Performed by: COUNSELOR

## 2022-08-18 NOTE — PSYCH
Psychotherapy Provided: Individual Psychotherapy 45 minutes     Length of time in session: 45 minutes, follow up in 2 week    Encounter Diagnosis     ICD-10-CM    1  Mild major depression ([address] )  F32 0    2  Anxiety  F41 9        Goals addressed in session: Goal 1     Pain:      none    0    Current suicide risk : Low     D: Robert expressed that she's been thinking a lot about having children with her   She stated that since finding out the issues regarding her family's history of premenopause she is at risk for hitting it sooner then later  She reported that she doesn't want to have kids at this moment as she's starting out in her career  She enjoys her job and is hoping to get promoted within the next few years  Robert expressed that she doesn't have a degree and was able to learn everything that she has with this job  She identified that her  wants them to figure things out after the baby is here, but she would like to plan things  She has been thinking about  as well and how they will be able to afford that  This provider encouraged her to make a pros and cons list with her  in regards to having children vs  Not    A: Robert was open discussing her struggles in deciding if she wants to have kids or not  It appears there is a lot of factors playing a part and 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

## 2022-08-30 ENCOUNTER — OFFICE VISIT (OUTPATIENT)
Dept: FAMILY MEDICINE CLINIC | Facility: CLINIC | Age: 27
End: 2022-08-30
Payer: COMMERCIAL

## 2022-08-30 VITALS
HEART RATE: 66 BPM | BODY MASS INDEX: 31.39 KG/M2 | TEMPERATURE: 99.1 F | HEIGHT: 65 IN | DIASTOLIC BLOOD PRESSURE: 82 MMHG | RESPIRATION RATE: 14 BRPM | WEIGHT: 188.4 LBS | OXYGEN SATURATION: 100 % | SYSTOLIC BLOOD PRESSURE: 120 MMHG

## 2022-08-30 DIAGNOSIS — F41.8 DEPRESSION WITH ANXIETY: Primary | ICD-10-CM

## 2022-08-30 PROCEDURE — 99213 OFFICE O/P EST LOW 20 MIN: CPT | Performed by: FAMILY MEDICINE

## 2022-08-30 NOTE — PROGRESS NOTES
FAMILY PRACTICE OFFICE VISIT       NAME: Shahla Oleary  AGE: 24 y o  SEX: female       : 1995        MRN: [id number]    DATE: 2022  TIME: 5:09 PM    Assessment and Plan   1  Depression with anxiety  Comments:  Pt stable on present management - will continue for now Escitalopram with low dose Rexulti  Pt to continue a healthy, lower carb diet, & regular exercise  There are no Patient Instructions on file for this visit  Chief Complaint     Chief Complaint   Patient presents with    Follow-up     Patient being seen for 2 month follow up        History of Present Illness   Shahla Oleary is a 24y o -year-old female who presents in f/u  She likes Rexulti - feeling "more stable on it in a long time "  Emotions more in check  Is doing counseling  So is   Communicating better  No HI/SI  Has noticed some wt gain - wants to give it more time though on the meds  Plans to go to gym more  Review of Systems   Review of Systems   Constitutional: Negative for activity change  Respiratory: Negative for shortness of breath  Cardiovascular: Negative for chest pain  Psychiatric/Behavioral: Negative for dysphoric mood and suicidal ideas  The patient is not nervous/anxious          Active Problem List     Patient Active Problem List   Diagnosis    Depression with anxiety    Insomnia    Motion sickness    Anxiety    Family history of primary ovarian failure    Mild major depression ([address] )         Past Medical History:  Past Medical History:   Diagnosis Date    Allergic rhinitis due to pollen     LAST ASSESSED [id number]    Anxiety     Depression     Dysmenorrhea     LAST ASSESSED [id number]    Globus sensation     LAST ASSESSED [id number]    Wrist sprain     LAST ASSESSED [id number]       Past Surgical History:  Past Surgical History:   Procedure Laterality Date    NO PAST SURGERIES         Family History:  Family History   Problem Relation Age of Onset    Other Mother IDIOPATHIC THROMBOCYTOPENIC PURPURA    Alcohol abuse Father     Anemia Father     Esophageal cancer Maternal Grandfather     Bone cancer Maternal Grandfather     Cancer Maternal Grandfather     Multiple sclerosis Paternal Grandfather        Social History:  Social History     Socioeconomic History    Marital status: /Civil Union     Spouse name: Not on file    Number of children: Not on file    Years of education: Not on file    Highest education level: Not on file   Occupational History    Not on file   Tobacco Use    Smoking status: Never Smoker    Smokeless tobacco: Never Used   Vaping Use    Vaping Use: Never used   Substance and Sexual Activity    Alcohol use: Yes     Alcohol/week: 1 0 standard drink     Types: 1 Glasses of wine per week    Drug use: No    Sexual activity: Yes     Partners: Male     Birth control/protection: Condom Male     Comment: Lo loestrin   Other Topics Concern    Not on file   Social History Narrative    CAFFEINE USE    DAILY COFFEE     Social Determinants of Health     Financial Resource Strain: Not on file   Food Insecurity: Not on file   Transportation Needs: Not on file   Physical Activity: Not on file   Stress: Not on file   Social Connections: Not on file   Intimate Partner Violence: Not on file   Housing Stability: Not on file       Objective     Vitals:    08/30/22 1620   BP: 120/82   Pulse: 66   Resp: 14   Temp: 99 1 °F (37 3 °C)   SpO2: 100%     Wt Readings from Last 3 Encounters:   08/30/22 85 5 kg (188 lb 6 4 oz)   06/29/22 77 2 kg (170 lb 3 2 oz)   06/15/22 75 8 kg (167 lb 3 2 oz)       Physical Exam  Vitals and nursing note reviewed  Constitutional:       General: She is not in acute distress  Appearance: Normal appearance  She is not ill-appearing, toxic-appearing or diaphoretic  HENT:      Head: Normocephalic and atraumatic  Eyes:      General: No scleral icterus       Conjunctiva/sclera: Conjunctivae normal    Neurological: Mental Status: She is alert and oriented to person, place, and time  Psychiatric:         Mood and Affect: Mood normal          Behavior: Behavior normal          Thought Content:  Thought content normal          Judgment: Judgment normal          Pertinent Laboratory/Diagnostic Studies:  Lab Results   Component Value Date    BUN 11 03/14/2020    CREATININE 0 99 03/14/2020    CALCIUM 9 0 03/14/2020    K 4 1 03/14/2020    CO2 25 03/14/2020     03/14/2020     Lab Results   Component Value Date    ALT 35 03/14/2020    AST 21 03/14/2020    ALKPHOS 60 03/14/2020       Lab Results   Component Value Date    WBC 4 76 03/14/2020    HGB 15 7 (H) 03/14/2020    HCT 47 4 (H) 03/14/2020    MCV 92 03/14/2020     03/14/2020       No results found for: TSH    No results found for: CHOL  Lab Results   Component Value Date    TRIG 61 03/14/2020     Lab Results   Component Value Date    HDL 54 03/14/2020     Lab Results   Component Value Date    LDLCALC 101 (H) 03/14/2020     No results found for: HGBA1C    Results for orders placed or performed in visit on 03/21/22   Liquid-based pap, screening   Result Value Ref Range    Case Report       Gynecologic Cytology Report                       Case: [id number]                                  Authorizing Provider:  Tom Guardado PA-C         Collected:           03/21/2022 1259              Ordering Location:     [facility]      Received:            03/21/2022 1259                                     [facility]                                                                          First Screen:          Lawrnce Manner, [place]                                                    Specimen:    LIQUID-BASED PAP, SCREENING, Cervix                                                        Primary Interpretation Negative for intraepithelial lesion or malignancy     Specimen Adequacy       Satisfactory for evaluation  Endocervical/transformation zone component present  Additional Information       Reocar's FDA approved ,  and ThinPrep Imaging Duo System are utilized with strict adherence to the 's instruction manual to prepare gynecologic and non-gynecologic cytology specimens for the production of ThinPrep slides as well as for gynecologic ThinPrep imaging  These processes have been validated by our laboratory and/or by the   The Pap test is not a diagnostic procedure and should not be used as the sole means to detect cervical cancer  It is only a screening procedure to aid in the detection of cervical cancer and its precursors  Both false-negative and false-positive results have been experienced  Your patient's test result should be interpreted in this context together with the history and clinical findings  No orders of the defined types were placed in this encounter  ALLERGIES:  Allergies   Allergen Reactions    Fruit C [Ascorbate - Food Allergy]      Fruit- stomach pains     Pollen Extract Allergic Rhinitis       Current Medications     Current Outpatient Medications   Medication Sig Dispense Refill    Brexpiprazole (Rexulti) 1 MG tablet Take 1 tablet (1 mg total) by mouth daily 90 tablet 1    escitalopram (LEXAPRO) 10 mg tablet Take 1 tablet (10 mg total) by mouth daily 90 tablet 1    norgestrel-ethinyl estradiol (Cryselle-28) 0 3 mg-30 mcg per tablet Take 1 tablet by mouth daily 84 tablet 4     No current facility-administered medications for this visit           Health Maintenance     Health Maintenance   Topic Date Due    COVID-19 Vaccine (1) Never done    HIV Screening  Never done    HPV Vaccine (3 - 3-dose series) 09/27/2014    Influenza Vaccine (1) 09/01/2022    BMI: Followup Plan  01/19/2023    Annual Physical  01/19/2023    Depression Remission PHQ  06/29/2023    BMI: Adult  08/30/2023    Cervical Cancer Screening  03/21/2025    DTaP,Tdap,and Td Vaccines (5 - Td or Tdap) 12/27/2027    Hepatitis C Screening  Completed    HIB Vaccine  Completed    Hepatitis B Vaccine  Completed    IPV Vaccine  Completed    Pneumococcal Vaccine: Pediatrics (0 to 5 Years) and At-Risk Patients (6 to 59 Years)  Aged Out    Hepatitis A Vaccine  Aged Out    Meningococcal ACWY Vaccine  Aged [employer] History   Administered Date(s) Administered    DTaP 5 1995, 1997, 1997, 07/16/1998, 03/12/2002    HPV Quadrivalent 02/10/2014, 04/09/2014, 05/27/2014    Hep B, adult 1995, 1997, 1997, 03/15/2018    Hib (PRP-OMP) 1997, 1997, 1997, 07/16/1998    INFLUENZA 12/27/2017    IPV 1997, 1997, 06/19/1998, 03/12/2002    Influenza Quadrivalent Preservative Free 3 years and older IM 12/27/2017    MMR 06/19/1998, 03/12/2002    Meningococcal, Unknown Serogroups 09/09/2008    Tdap 09/09/2008, 12/27/2017    Varicella 07/16/1998, 09/09/2008          Efren Jones DO

## 2022-08-31 ENCOUNTER — TELEMEDICINE (OUTPATIENT)
Dept: BEHAVIORAL/MENTAL HEALTH CLINIC | Facility: CLINIC | Age: 27
End: 2022-08-31
Payer: COMMERCIAL

## 2022-08-31 DIAGNOSIS — F41.8 DEPRESSION WITH ANXIETY: Primary | ICD-10-CM

## 2022-08-31 PROCEDURE — 90834 PSYTX W PT 45 MINUTES: CPT | Performed by: COUNSELOR

## 2022-08-31 NOTE — PSYCH
Psychotherapy Provided: Individual Psychotherapy 45 minutes     Length of time in session: 45 minutes, follow up in 2 week    Encounter Diagnosis     ICD-10-CM    1  Depression with anxiety  F41 8        Goals addressed in session: Goal 1     Pain:      none    0    Current suicide risk : Low     D: Robert expressed that she was able to have a conversation with her boss regarding maternity leave and putting something into place  She reported that she didn't want to have the conversation with him, but did it anymore  She stated that he was supportive in helping her achieve her goals at work, but also trying to create that work/life balance  She expressed that this made her cry  She had a discussion with her  regarding having kids and they decided to wait until after their vacations and will begin trying the start of summer  We processed through a conflict that was happening with her sister-in-law more over these past few weeks  Robert identified that she apologized to her by texting her and that her sister-in-law wasn't nice about it  She reported that she's not sure what she can do as she's trying to change and not shut people out  She was trying to be nice and doesn't feel it helped her  We dicussed focusing on what she can control vs  What she can't  A: Robert was mood congruent, oriented x3, denied SI, SIB, HI  She recognizes her own faults and has been making changes to her own behavior  She is struggling with her angry when things are not going the way she had hoped  This is typically when it's beyond her control  P: This provider will continue working on the identified goals  Behavioral Health Treatment Plan [facility]: Diagnosis and Treatment Plan explained to Robert Jones relates understanding diagnosis and is agreeable to Treatment Plan   Yes

## 2022-09-13 DIAGNOSIS — F41.8 DEPRESSION WITH ANXIETY: ICD-10-CM

## 2022-09-13 RX ORDER — ESCITALOPRAM OXALATE 10 MG/1
10 TABLET ORAL DAILY
Qty: 90 TABLET | Refills: 0 | Status: SHIPPED | OUTPATIENT
Start: 2022-09-13

## 2022-09-15 ENCOUNTER — SOCIAL WORK (OUTPATIENT)
Dept: BEHAVIORAL/MENTAL HEALTH CLINIC | Facility: CLINIC | Age: 27
End: 2022-09-15
Payer: COMMERCIAL

## 2022-09-15 DIAGNOSIS — F41.9 ANXIETY: ICD-10-CM

## 2022-09-15 DIAGNOSIS — F32.0 MILD MAJOR DEPRESSION (HCC): Primary | ICD-10-CM

## 2022-09-15 PROCEDURE — 90834 PSYTX W PT 45 MINUTES: CPT | Performed by: COUNSELOR

## 2022-09-15 NOTE — PSYCH
Psychotherapy Provided: Individual Psychotherapy 45 minutes     Length of time in session: 45 minutes, follow up in 2 week    Encounter Diagnosis     ICD-10-CM    1  Mild major depression ([address] )  F32 0    2  Anxiety  F41 9        Goals addressed in session: Goal 1     Pain:      none    0    Current suicide risk : Low     D: Robert expressed that she's been dealing with some rejection lately  She identified that her  and her were not invited to a family function, which caused her to feel upset, even though she doesn't care  She explained that her 's ex was invited, but they were not which didn't sit well with her  She explained a few other situations that have happened and were upsetting her  This provider challenged her thoughts, but Robert was persistent in how things were going  She stated that her  has been getting mad at her and has been selfish not listening  This provider encouraged her to share what she needs from him and complete a self-care activity for herself this weekend  A: Robert appeared depressed in session today, expressing that she wishes she wasn't here anymore, she denied SA or SI  She teared up a bit in session today when discussing about all the rejection she's enduring  She appears to be re-traumatized by her past when being rejected and would rather shut people out before they do it to her  P: This provider will continue working on the identified goals  Behavioral Health Treatment Plan [facility]: Diagnosis and Treatment Plan explained to Robert, Robert relates understanding diagnosis and is agreeable to Treatment Plan   Yes

## 2022-09-29 ENCOUNTER — SOCIAL WORK (OUTPATIENT)
Dept: BEHAVIORAL/MENTAL HEALTH CLINIC | Facility: CLINIC | Age: 27
End: 2022-09-29

## 2022-09-29 DIAGNOSIS — F41.9 ANXIETY: ICD-10-CM

## 2022-09-29 DIAGNOSIS — F32.0 MILD MAJOR DEPRESSION (HCC): Primary | ICD-10-CM

## 2022-09-29 NOTE — PSYCH
Psychotherapy Provided: Individual Psychotherapy 45 minutes     Length of time in session: 45 minutes, follow up in 2 week    Encounter Diagnosis     ICD-10-CM    1  Mild major depression ([facility] [address] )  F32 0    2  Anxiety  F41 9         Time In: 4:45PM  Time Out: 5:30PM    Goals addressed in session: Goal 1     Pain:      none    0    Current suicide risk : Low     D: Robert expressed that things have been strained between herself and her   She stated that she will be good for a few weeks working on the issues they have, but then goes back to his old self  She recognizes that she has things to work on, but during their couple's session he didn't identify anything that she needs to work on and that she wanted him there  She stated that he's very selfish and their sexual desires don't always add up  She recently after they  found out about a sexual fantasy that her  has been thinking about since he was 8years old  She identified to him that she could never fill that void and asking him why he didn't let her know sooner  We discussed feeling comfortable in the relationship and how they can do different things to spice it up  She reported that she wasn't sure and recognizes that he's comfortable with the way things are  This provider encouraged her to be assertive in her couples counseling in order to create SMART goals for them to improve their marriage with a deadline as her  will report that he wants to make things better, but doesn't change  We discussed the possibly of a divorce and her thoughts regarding that  A: Robert was mood congruent, oriented x3, denied SI, SIB, HI  She seems to be struggling with making a decision regarding her marriage and is determined to make it work  She's experiencing anxiety due to trying to work on things when a lot of the issues present in her marriage are due to her spouse  They have begun seeing a couple's counselor   This provider offered her validation and feedback to support her  P: This provider will continue working on the identified goals  Behavioral Health Treatment Plan [facility]: Diagnosis and Treatment Plan explained to Robert, Robert relates understanding diagnosis and is agreeable to Treatment Plan   Yes

## 2022-10-13 ENCOUNTER — SOCIAL WORK (OUTPATIENT)
Dept: BEHAVIORAL/MENTAL HEALTH CLINIC | Facility: CLINIC | Age: 27
End: 2022-10-13

## 2022-10-13 DIAGNOSIS — F41.9 ANXIETY: ICD-10-CM

## 2022-10-13 DIAGNOSIS — F32.0 MILD MAJOR DEPRESSION (HCC): Primary | ICD-10-CM

## 2022-10-13 NOTE — PSYCH
Psychotherapy Provided: Individual Psychotherapy 50 minutes     Length of time in session: 50 minutes, follow up in 3 week    Encounter Diagnosis     ICD-10-CM    1  Mild major depression ([address] )  F32 0    2  Anxiety  F41 9        Goals addressed in session: Goal 1     Pain:      none    0    Current suicide risk : Low     D: Robert expressed that she's been getting into arguments with her  which has been causing her to feel guilty  She explained that she is consistently making sacrifices for her relationship and being uncomfortable doing things, but doesn't see her  changing  She identified that her  has expressed that he will change for the past 4 years, but hasn't seen any progress  This provider offered her validation and support, recognizing that she is growing as a person  Robert shared that she has spoken to her mom a few months ago about having her move in if she does get , but she is hopeful that Del will change  We discussed the timeline and they discussed having to make a decision by May  A: Robert was mood congruent, oriented x3, denied SI, SIB, HI  Robert is motivated to work through the issues in her marriage and is making sacrifices to get herself out of the comfort zone  Robert has recognized that her  is exactly like her father and that she left him for a reason  She consistently feels guilt and is trying to make it work, but struggling to make a decision for what she wants for herself  P: This provider will continue working on the identified goals  Behavioral Health Treatment Plan [facility]: Diagnosis and Treatment Plan explained to Robert Robert relates understanding diagnosis and is agreeable to Treatment Plan   Yes     Visit Time    Visit Start Time: 5:00 PM  Visit Stop Time: 5:50PM  Total Visit Duration: 50 minutes

## 2022-10-25 ENCOUNTER — OFFICE VISIT (OUTPATIENT)
Dept: OBGYN CLINIC | Facility: MEDICAL CENTER | Age: 27
End: 2022-10-25

## 2022-10-25 VITALS — SYSTOLIC BLOOD PRESSURE: 130 MMHG | DIASTOLIC BLOOD PRESSURE: 84 MMHG | WEIGHT: 192.4 LBS | BODY MASS INDEX: 32.02 KG/M2

## 2022-10-25 DIAGNOSIS — Z31.9 DESIRE FOR PREGNANCY: ICD-10-CM

## 2022-10-25 DIAGNOSIS — R79.89 LOW ANTI-MULLERIAN HORMONE: Primary | ICD-10-CM

## 2022-10-26 NOTE — PROGRESS NOTES
Lauren Alonso  1995    S:  24 y o  female here with request for blood work  She has a family hx significant for primary ovarian failure in her mother at age 32 and grandmother in her 35s  She had AMH levels drawn in 2/13/22, which was 0 748 and in 3/14/2020, of 0 720  Was recommended to be seen by KATJA for consult  Saw Dr Natasha Mckeon nearly 1 year ago and was recommend that if she desired pregnancy, she should "TTC w/in the next 6 months " She is presenting today requesting her AMH levels to be redrawn  She and her  desire children  She is currently on Cryselle and is planning to go off of pill next summer when she feels she will be ready for pregnancy  Is unsure if she is ready for pregnancy currently  Admits some apprehension regarding her job, and whether of not she will be given time off for a pregnancy/delivery  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  Is also unsure if she is prepared for this life change  Review of Systems   Respiratory: Negative  Cardiovascular: Negative  Gastrointestinal: Negative  Genitourinary: Negative  O:  /84 (BP Location: Left arm, Patient Position: Sitting, Cuff Size: Standard)   Wt 87 3 kg (192 lb 6 4 oz)   LMP 10/20/2022   BMI 32 02 kg/m²     She appears well and is in no distress  Normocephalic, atraumatic  Normal respiratory effort  Abdomen is soft and nontender  No focal neurological deficits  Normal mood, affect, and behavior  A/P:  1  Low anti-Mullerian hormone    - Ambulatory Referral to Infertility; Future  - Antimullerian hormone (AMH); Future    2  Desire for pregnancy    - Antimullerian hormone (AMH); Future    We reviewed definition of primary ovarian failure and implications on fertility  Advised to heed recommendations by fertility specialist given previously documented low AMH of 0 7  She feels that if she sees declining level she may be more likely to move up her timeframe for pregnancy  Discussed how declining level also has implications on predictability of egg retrieval and how waiting could also decrease available options for conception  Expressed understanding regarding the significant life change that occurs with having children, and the importance of readiness for this  Discussed FMLA regarding work and time off concerns  AMH ordered  Advised to continue OCP until ready to TTC  To begin a PNV  Encouraged f/u with KATJA or secondary consult by another reproductive endocrinologist in near future  She verbalizes understanding and agreement with plan  All questions answered

## 2022-11-03 ENCOUNTER — SOCIAL WORK (OUTPATIENT)
Dept: BEHAVIORAL/MENTAL HEALTH CLINIC | Facility: CLINIC | Age: 27
End: 2022-11-03

## 2022-11-03 DIAGNOSIS — F32.0 MILD MAJOR DEPRESSION (HCC): Primary | ICD-10-CM

## 2022-11-03 DIAGNOSIS — F41.9 ANXIETY: ICD-10-CM

## 2022-11-03 NOTE — PSYCH
Psychotherapy Provided: Individual Psychotherapy 52 minutes     Length of time in session: 52 minutes, follow up in 2 week    Encounter Diagnosis     ICD-10-CM    1  Mild major depression ([address] )  F32 0    2  Anxiety  F41 9        Goals addressed in session: Goal 1     Pain:      none    0    Current suicide risk : Low     D: Robert stated that she had a good time on vacation with her   She reported this past week was a little difficult as when she wanted to sit down and talk about things with her  he mentioned that she was distant and this was the first time he was thinking about leaving her  She processed through this of how difficult it was to hear this as her  has been the only person who has fought for her  She stated that since coming to therapy and identifying problems within his family there has been distance between the two of them  She recognizes that he's more family oriented then he leads on to believe  She is stuck and unsure what to do  She feels as though she's doing everything she can to make it work  Robert and this provider explored her 's relationship with his family and how that's important to him  Robert expressed not wanting to make up with his family members because she feels very hurt by them  This provider validated her feelings and provided feedback  A: Robert appeared sad today in session and was tearful when discussing the possible divorce with her   It seems that she's struggling to identify what she wants  She is recognizing past trauma of cutting others out and how she feels her  is doing the same thing  She denied SI, SIB, HI  She was oriented x3  P: This provider will continue working on the identified goals  Behavioral Health Treatment Plan [facility]: Diagnosis and Treatment Plan explained to Robert Jones relates understanding diagnosis and is agreeable to Treatment Plan   Yes     Visit start and stop times:    11/03/22  Start Time: 1656  Stop Time: 6997  Total Visit Time: 52 minutes

## 2022-11-09 ENCOUNTER — APPOINTMENT (OUTPATIENT)
Dept: LAB | Facility: MEDICAL CENTER | Age: 27
End: 2022-11-09

## 2022-11-09 DIAGNOSIS — R79.89 LOW ANTI-MULLERIAN HORMONE: ICD-10-CM

## 2022-11-09 DIAGNOSIS — Z31.9 DESIRE FOR PREGNANCY: ICD-10-CM

## 2022-11-13 LAB — MIS SERPL-MCNC: 0.18 NG/ML

## 2022-11-17 ENCOUNTER — SOCIAL WORK (OUTPATIENT)
Dept: BEHAVIORAL/MENTAL HEALTH CLINIC | Facility: CLINIC | Age: 27
End: 2022-11-17

## 2022-11-17 DIAGNOSIS — F41.9 ANXIETY: ICD-10-CM

## 2022-11-17 DIAGNOSIS — F32.0 MILD MAJOR DEPRESSION (HCC): Primary | ICD-10-CM

## 2022-12-15 ENCOUNTER — TELEMEDICINE (OUTPATIENT)
Dept: BEHAVIORAL/MENTAL HEALTH CLINIC | Facility: CLINIC | Age: 27
End: 2022-12-15

## 2022-12-15 ENCOUNTER — TELEPHONE (OUTPATIENT)
Dept: PSYCHIATRY | Facility: CLINIC | Age: 27
End: 2022-12-15

## 2022-12-15 DIAGNOSIS — F32.0 MILD MAJOR DEPRESSION (HCC): Primary | ICD-10-CM

## 2022-12-15 DIAGNOSIS — F41.9 ANXIETY: ICD-10-CM

## 2022-12-15 NOTE — BH TREATMENT PLAN
Tessie Michael  1995         Date of Initial Treatment Plan: 6/29/2022  Date of Current Treatment Plan: 12/15/2022     Treatment Plan Number 2     Strengths/Personal Resources for Self Care: wineries, camping, fishing, spend time with dogs      Diagnosis:   1  Mild major depression ([address] )            Area of Needs: process through her past and improve herself        Long Term Goal 1: Robert will process through her past and move forward      Target Date: 6/12/2023  Completion Date: TBD         Short Term Objectives for Goal 1:               A: Robert will discuss her past through talk therapist and CBT              B: Robert will identify her feelings regarding her past              C: Robert will develop cognitive restructuring skills in order to move on from her past     Long Term Goal 2: Robert will improve herself and how she acts towards others     Target Date: 6/12/2023  Completion Date: TBD     Short Term Objectives for Goal 2:           A: Robert will utilize talk therapy and CBT          B: Robert will identify what she likes about herself          C: Robert will identify how she would like to treat others          D: Robert will work on empowering herself     GOAL 1: Modality: Individual 2x per month   Completion Date TBD and The person(s) responsible for carrying out the plan is  Ayla     GOAL 2: Modality: Individual 2x per month   Completion Date TBD and The person(s) responsible for carrying out the plan is  Marcos Anderson [facility],  [address]: Diagnosis and Treatment Plan explained to Robert Robert relates understanding diagnosis and is agreeable to Treatment Plan          Client Comments : Please share your thoughts, feelings, need and/or experiences regarding your treatment plan: Robert expressed that she's improving with her goals   She is able to process through her past traumas and relationships and will continue to work through these difficulties  Jama Alvarado, 1995, actively participated in the review and update of this treatment plan during a virtual session, using the [employer] platform     Jama Alvarado  provided verbal consent on 12/15/2022 at 3:48PM  The treatment plan was transcribed into the [employer] Record at a later time

## 2022-12-15 NOTE — PSYCH
Virtual Regular Visit    Verification of patient location:    Patient is located in the following state in which I hold an active license [place]      Assessment/Plan:    Problem List Items Addressed This Visit    None      Goals addressed in session: Goal 1          Reason for visit is No chief complaint on file  Encounter provider Odilon Wilson    Provider located at [address] [place] [id number] [phone]      Recent Visits  No visits were found meeting these conditions  Showing recent visits within past 7 days and meeting all other requirements  Future Appointments  No visits were found meeting these conditions  Showing future appointments within next 150 days and meeting all other requirements       The patient was identified by name and date of birth  Rosangela Alvarez was informed that this is a telemedicine visit and that the visit is being conducted throughthe [employer] platform  She agrees to proceed     My office door was closed  No one else was in the room  She acknowledged consent and understanding of privacy and security of the video platform  The patient has agreed to participate and understands they can discontinue the visit at any time  Patient is aware this is a billable service  Cata Doty is a 24 y o  female attended a virtual session   D: Robert expressed that things have been going okay  She reported that her  and her are not taking precaution to prevent having a baby  She identified that she had an ultrasound and goes back next week to figure out some more things regarding her egg count  She reported that they seem to be having some of the same arguments  She identified that they will agree on something and then he will ask for more  She expressed that she feels guilty and isn't sure how not to   This provider encouraged her to challenge her thoughts and uphold her boundaries  She began talking about how her dad used to make her feel this way when she was young  We updated her treatment plan and discussed talking more about her past relationships  A: [place] was mood congruent, although she yawned a lot and expressed feeling tired  She struggles with her own self-worth and would rather for others to be happier then herself  She was goal oriented, denied SI, SIB, HI  She was oriented x3  P: This provider will continue working on the identified goals  HPI     Past Medical History:   Diagnosis Date   • Allergic rhinitis due to pollen     LAST ASSESSED [id number]   • Anxiety    • Depression    • Dysmenorrhea     LAST ASSESSED [id number]   • Globus sensation     LAST ASSESSED [id number]   • Wrist sprain     LAST ASSESSED [id number]       Past Surgical History:   Procedure Laterality Date   • NO PAST SURGERIES         Current Outpatient Medications   Medication Sig Dispense Refill   • Brexpiprazole (Rexulti) 1 MG tablet Take 1 tablet (1 mg total) by mouth daily (Patient not taking: Reported on 10/25/2022) 90 tablet 1   • escitalopram (LEXAPRO) 10 mg tablet Take 1 tablet (10 mg total) by mouth daily 90 tablet 0   • norgestrel-ethinyl estradiol (Cryselle-28) 0 3 mg-30 mcg per tablet Take 1 tablet by mouth daily 84 tablet 4     No current facility-administered medications for this visit  Allergies   Allergen Reactions   • Fruit C [Ascorbate - Food Allergy]      Fruit- stomach pains    • Pollen Extract Allergic Rhinitis       Review of Systems    Video Exam    There were no vitals filed for this visit      Physical Exam     Visit Time    12/15/22  Start Time: 9664  Stop Time: 6595  Total Visit Time: 45 minutes

## 2023-01-05 ENCOUNTER — TELEPHONE (OUTPATIENT)
Dept: BEHAVIORAL/MENTAL HEALTH CLINIC | Facility: CLINIC | Age: 28
End: 2023-01-05

## 2023-01-12 DIAGNOSIS — F41.8 DEPRESSION WITH ANXIETY: ICD-10-CM

## 2023-01-12 RX ORDER — ESCITALOPRAM OXALATE 10 MG/1
10 TABLET ORAL DAILY
Qty: 90 TABLET | Refills: 0 | Status: SHIPPED | OUTPATIENT
Start: 2023-01-12 | End: 2023-01-16 | Stop reason: ALTCHOICE

## 2023-01-16 ENCOUNTER — OFFICE VISIT (OUTPATIENT)
Dept: FAMILY MEDICINE CLINIC | Facility: CLINIC | Age: 28
End: 2023-01-16

## 2023-01-16 ENCOUNTER — SOCIAL WORK (OUTPATIENT)
Dept: BEHAVIORAL/MENTAL HEALTH CLINIC | Facility: CLINIC | Age: 28
End: 2023-01-16

## 2023-01-16 VITALS
DIASTOLIC BLOOD PRESSURE: 72 MMHG | BODY MASS INDEX: 32.65 KG/M2 | HEART RATE: 79 BPM | HEIGHT: 65 IN | RESPIRATION RATE: 14 BRPM | OXYGEN SATURATION: 98 % | WEIGHT: 196 LBS | SYSTOLIC BLOOD PRESSURE: 110 MMHG | TEMPERATURE: 98.3 F

## 2023-01-16 DIAGNOSIS — F32.0 MILD MAJOR DEPRESSION (HCC): Primary | ICD-10-CM

## 2023-01-16 DIAGNOSIS — F41.8 DEPRESSION WITH ANXIETY: Primary | ICD-10-CM

## 2023-01-16 NOTE — PROGRESS NOTES
FAMILY PRACTICE OFFICE VISIT       NAME: Jaclyn Bowling  AGE: 24 y o  SEX: female       : 1995        MRN: [id number]    DATE: 2023  TIME: 5:10 PM    Assessment and Plan   1  Depression with anxiety  Comments:  Pt stable -> switch Escitalopram to more pregnancy-favorable Sertraline at this time  Precautions give  Stay off [address] for now  Orders:  -     sertraline (Zoloft) 50 mg tablet; Take 1 tablet (50 mg total) by mouth daily         There are no Patient Instructions on file for this visit  Chief Complaint     Chief Complaint   Patient presents with   • Follow-up     Patient being seen for 3 month follow up        History of Present Illness   Jaclyn Bowling is a 24y o -year-old female who presents in f/u today  Off Rexulti now approx 2 months - ran out  Overall, mood doing ok -> is considering trying to get pregnant right now; off birth control  [facility] now  Review of Systems   Review of Systems   Constitutional: Negative for activity change  Psychiatric/Behavioral: Negative for dysphoric mood  The patient is not nervous/anxious          Active Problem List     Patient Active Problem List   Diagnosis   • Depression with anxiety   • Insomnia   • Motion sickness   • Anxiety   • Family history of primary ovarian failure   • Mild major depression ([facility] [address] )         Past Medical History:  Past Medical History:   Diagnosis Date   • Allergic rhinitis due to pollen     LAST ASSESSED [id number]   • Anxiety    • Depression    • Dysmenorrhea     LAST ASSESSED [id number]   • Globus sensation     LAST ASSESSED [id number]   • Wrist sprain     LAST ASSESSED [id number]       Past Surgical History:  Past Surgical History:   Procedure Laterality Date   • NO PAST SURGERIES         Family History:  Family History   Problem Relation Age of Onset   • Other Mother         IDIOPATHIC THROMBOCYTOPENIC PURPURA   • Alcohol abuse Father    • Anemia Father    • Esophageal cancer Maternal Grandfather • Bone cancer Maternal Grandfather    • Cancer Maternal Grandfather    • Multiple sclerosis Paternal Grandfather        Social History:  Social History     Socioeconomic History   • Marital status: /Civil Union     Spouse name: Not on file   • Number of children: Not on file   • Years of education: Not on file   • Highest education level: Not on file   Occupational History   • Not on file   Tobacco Use   • Smoking status: Never   • Smokeless tobacco: Never   Vaping Use   • Vaping Use: Never used   Substance and Sexual Activity   • Alcohol use: Yes     Alcohol/week: 1 0 standard drink     Types: 1 Glasses of wine per week   • Drug use: No   • Sexual activity: Yes     Partners: Male     Birth control/protection: OCP     Comment: Lo loestrin   Other Topics Concern   • Not on file   Social History Narrative    CAFFEINE USE    DAILY COFFEE     Social Determinants of Health     Financial Resource Strain: Not on file   Food Insecurity: Not on file   Transportation Needs: Not on file   Physical Activity: Not on file   Stress: Not on file   Social Connections: Not on file   Intimate Partner Violence: Not on file   Housing Stability: Not on file       Objective     Vitals:    01/16/23 1644   BP: 110/72   Pulse: 79   Resp: 14   Temp: 98 3 °F (36 8 °C)   SpO2: 98%     Wt Readings from Last 3 Encounters:   01/16/23 88 9 kg (196 lb)   10/25/22 87 3 kg (192 lb 6 4 oz)   08/30/22 85 5 kg (188 lb 6 4 oz)       Physical Exam  Vitals and nursing note reviewed  Constitutional:       General: She is not in acute distress  Appearance: Normal appearance  She is not ill-appearing, toxic-appearing or diaphoretic  HENT:      Head: Normocephalic and atraumatic  Eyes:      General: No scleral icterus  Conjunctiva/sclera: Conjunctivae normal    Neurological:      Mental Status: She is alert and oriented to person, place, and time     Psychiatric:         Mood and Affect: Mood normal          Behavior: Behavior normal  Thought Content: Thought content normal          Judgment: Judgment normal          Pertinent Laboratory/Diagnostic Studies:  Lab Results   Component Value Date    BUN 11 03/14/2020    CREATININE 0 99 03/14/2020    CALCIUM 9 0 03/14/2020    K 4 1 03/14/2020    CO2 25 03/14/2020     03/14/2020     Lab Results   Component Value Date    ALT 35 03/14/2020    AST 21 03/14/2020    ALKPHOS 60 03/14/2020       Lab Results   Component Value Date    WBC 4 76 03/14/2020    HGB 15 7 (H) 03/14/2020    HCT 47 4 (H) 03/14/2020    MCV 92 03/14/2020     03/14/2020       No results found for: TSH    No results found for: CHOL  Lab Results   Component Value Date    TRIG 61 03/14/2020     Lab Results   Component Value Date    HDL 54 03/14/2020     Lab Results   Component Value Date    LDLCALC 101 (H) 03/14/2020     No results found for: HGBA1C    Results for orders placed or performed in visit on 11/09/22   Antimullerian hormone (AMH)   Result Value Ref Range    ANTI-MULLERIAN HORMONE (AMH) 0 180 ng/mL       No orders of the defined types were placed in this encounter  ALLERGIES:  Allergies   Allergen Reactions   • Fruit C [Ascorbate - Food Allergy]      Fruit- stomach pains    • Pollen Extract Allergic Rhinitis       Current Medications     Current Outpatient Medications   Medication Sig Dispense Refill   • sertraline (Zoloft) 50 mg tablet Take 1 tablet (50 mg total) by mouth daily 90 tablet 1   • Brexpiprazole (Rexulti) 1 MG tablet Take 1 tablet (1 mg total) by mouth daily (Patient not taking: Reported on 10/25/2022) 90 tablet 1   • norgestrel-ethinyl estradiol (Cryselle-28) 0 3 mg-30 mcg per tablet Take 1 tablet by mouth daily (Patient not taking: Reported on 1/16/2023) 84 tablet 4     No current facility-administered medications for this visit           Health Maintenance     Health Maintenance   Topic Date Due   • COVID-19 Vaccine (1) Never done   • HIV Screening  Never done   • HPV Vaccine (3 - 3-dose series) 09/27/2014   • Influenza Vaccine (1) 09/01/2022   • BMI: Followup Plan  01/19/2023   • Annual Physical  01/19/2023   • Depression Remission PHQ  07/16/2023   • BMI: Adult  01/16/2024   • Cervical Cancer Screening  03/21/2025   • DTaP,Tdap,and Td Vaccines (5 - Td or Tdap) 12/27/2027   • Hepatitis C Screening  Completed   • HIB Vaccine  Completed   • IPV Vaccine  Completed   • Pneumococcal Vaccine: Pediatrics (0 to 5 Years) and At-Risk Patients (6 to 59 Years)  Aged Out   • Hepatitis A Vaccine  Aged Out   • Meningococcal ACWY Vaccine  Aged Out   • Chlamydia Screening  Discontinued     Immunization History   Administered Date(s) Administered   • DTaP 5 1995, [birth year], 07/16/1998, 03/12/2002   • HPV Quadrivalent 02/10/2014, 04/09/2014, 05/27/2014   • Hep B, adult 1995, 1995, 1995, 03/15/2018   • Hib (PRP-OMP) 1995, 1995, 1995, 07/16/1998   • INFLUENZA 12/27/2017   • IPV 1995, 1995, 06/19/1998, 03/12/2002   • Influenza Quadrivalent Preservative Free 3 years and older IM 12/27/2017   • MMR 06/19/1998, 03/12/2002   • Meningococcal, Unknown Serogroups 09/09/2008   • Tdap 09/09/2008, 12/27/2017   • Varicella 07/16/1998, 09/09/2008          Marine Carnes DO

## 2023-01-16 NOTE — PSYCH
Behavioral Health Psychotherapy Progress Note    Psychotherapy Provided: Individual Psychotherapy     1  Mild major depression ([address] )            Goals addressed in session: Goal 1     DATA: Robert expressed that her [place] break went well and that things have been going well with her  right now  She stated that they baby sat his sister's child for the weekend as her sister-in-law had a baby  They decided that they are unsure if they want to have kids, but recognized if it happens then it happens  Robert began discussing about her cousins reaching out to her and discussing how she had a falling out with her family  She expressed that she misses her family, but doesn't want to be the first one to reach out  We processed through what happened in the past and how she would like to move past this  During this session, this clinician used the following therapeutic modalities: Client-centered Therapy, Cognitive Behavioral Therapy and Motivational Interviewing    Substance Abuse was not addressed during this session  If the client is diagnosed with a co-occurring substance use disorder, please indicate any changes in the frequency or amount of use: n/a  Stage of change for addressing substance use diagnoses: No substance use/Not applicable    ASSESSMENT:  Ashli Marroquin presents with a Depressed mood  her affect is Tearful, which is congruent, with her mood and the content of the session  The client has made progress on their goals  Robert struggles with things that have happened in the past and holding onto those things  She identified changing as a person and having her guard up as she feels everyone is out to get her  Ashli Marroquin presents with a none risk of suicide, none risk of self-harm, and none risk of harm to others  For any risk assessment that surpasses a "low" rating, a safety plan must be developed      A safety plan was indicated: no  If yes, describe in detail n/a    PLAN: Between sessions, Ashli Marroquin will continue challenging her negative thoughts  At the next session, the therapist will use Cognitive Behavioral Therapy to address her negative thoughts  Behavioral Health Treatment Plan and Discharge Planning: Ashli Marroquin is aware of and agrees to continue to work on their treatment plan  They have identified and are working toward their discharge goals   yes    Visit start and stop times:    01/16/23  Start Time: 1501  Stop Time: 1550  Total Visit Time: 49 minutes

## 2023-01-24 RX ORDER — SODIUM CHLORIDE 9 MG/ML
20 INJECTION, SOLUTION INTRAVENOUS CONTINUOUS
Status: DISCONTINUED | OUTPATIENT
Start: 2023-01-26 | End: 2023-01-29 | Stop reason: HOSPADM

## 2023-01-26 ENCOUNTER — HOSPITAL ENCOUNTER (OUTPATIENT)
Dept: INFUSION CENTER | Facility: CLINIC | Age: 28
Discharge: HOME | End: 2023-01-26

## 2023-01-26 VITALS
HEART RATE: 72 BPM | OXYGEN SATURATION: 99 % | RESPIRATION RATE: 18 BRPM | DIASTOLIC BLOOD PRESSURE: 87 MMHG | SYSTOLIC BLOOD PRESSURE: 124 MMHG | TEMPERATURE: 97.6 F

## 2023-01-26 LAB
CORTIS SERPL-MCNC: 13.4 UG/DL
CORTIS SERPL-MCNC: 13.7 UG/DL
CORTIS SERPL-MCNC: 26.4 UG/DL
CORTIS SERPL-MCNC: 29.4 UG/DL
CORTIS SERPL-MCNC: 33.5 UG/DL
CORTIS SERPL-MCNC: 36.1 UG/DL

## 2023-01-26 RX ADMIN — SODIUM CHLORIDE 0.25 MG: 9 INJECTION INTRAMUSCULAR; INTRAVENOUS; SUBCUTANEOUS at 08:00

## 2023-01-26 RX ADMIN — SODIUM CHLORIDE 20 ML/HR: 0.9 INJECTION, SOLUTION INTRAVENOUS at 08:01

## 2023-01-26 NOTE — PROGRESS NOTES
Patient tolerated injection and 30 minute & 60 minute post injection labs without complications  Patient aware to contact Dr Nolvia Menjivar office if they do not contact her for follow up within the next few days   Patient given AVS

## 2023-01-30 ENCOUNTER — SOCIAL WORK (OUTPATIENT)
Dept: BEHAVIORAL/MENTAL HEALTH CLINIC | Facility: CLINIC | Age: 28
End: 2023-01-30

## 2023-01-30 DIAGNOSIS — F32.0 MILD MAJOR DEPRESSION (HCC): Primary | ICD-10-CM

## 2023-01-30 DIAGNOSIS — F41.9 ANXIETY: ICD-10-CM

## 2023-01-30 NOTE — PSYCH
Behavioral Health Psychotherapy Progress Note    Psychotherapy Provided: Individual Psychotherapy     1  Mild major depression ([address] )        2  Anxiety            Goals addressed in session: Goal 1     DATA: Robert stated that she's frustrated with her sister and how she's raising her children  She explained the situation and how she's tried to speak to her about things, but that her sister doesn't want hear it  Robert reported that she was raised very differently then her sister  She explained that her sister was given everything and continues to be enabled  We discussed the pros and cons to reaching out for more support and getting family involved in raising her children  Robert stated that she is trying to have her own children and is recognizing all the bills she has to pay for fertility treatment  She explained that herself and her  are going to see if it happens without the support since it's so expensive  During this session, this clinician used the following therapeutic modalities: Client-centered Therapy, Motivational Interviewing and Supportive Psychotherapy    Substance Abuse was not addressed during this session  If the client is diagnosed with a co-occurring substance use disorder, please indicate any changes in the frequency or amount of use: n/a  Stage of change for addressing substance use diagnoses: No substance use/Not applicable    ASSESSMENT:  Joi Barron presents with a Euthymic/ normal mood  Robert is struggling in wanting to support her family, but also has to recognize that her niece and nephew are not her children  She is focusing a lot of time onto things that she can't control   her affect is Normal range and intensity, which is congruent, with her mood and the content of the session  The client has made progress on their goals  Joi Barron presents with a none risk of suicide, none risk of self-harm, and none risk of harm to others      For any risk assessment that surpasses a "low" rating, a safety plan must be developed  A safety plan was indicated: no  If yes, describe in detail n/a    PLAN: Between sessions, Gianfranco Gonsales will continue working on what she can control vs  What she can't  At the next session, the therapist will use Client-centered Therapy, Motivational Interviewing and Supportive Psychotherapy to address her anxiety and depression  Behavioral Health Treatment Plan and Discharge Planning: Gianfranco Gonsales is aware of and agrees to continue to work on their treatment plan  They have identified and are working toward their discharge goals   yes    Visit start and stop times:    01/30/23  Start Time: 1502  Stop Time: 1547  Total Visit Time: 45 minutes

## 2023-01-31 LAB
ALDOST SERPL-MCNC: 16 NG/DL (ref 0–30)
ALDOST SERPL-MCNC: 9.4 NG/DL (ref 0–30)

## 2023-02-01 LAB — ALDOST SERPL-MCNC: 18.9 NG/DL (ref 0–30)

## 2023-02-15 ENCOUNTER — TELEMEDICINE (OUTPATIENT)
Dept: BEHAVIORAL/MENTAL HEALTH CLINIC | Facility: CLINIC | Age: 28
End: 2023-02-15

## 2023-02-15 DIAGNOSIS — F41.9 ANXIETY: ICD-10-CM

## 2023-02-15 DIAGNOSIS — F32.0 MILD MAJOR DEPRESSION (HCC): Primary | ICD-10-CM

## 2023-02-15 NOTE — PSYCH
Behavioral Health Psychotherapy Progress Note    Psychotherapy Provided: Individual Psychotherapy     1  Mild major depression ([address] )        2  Anxiety            Goals addressed in session: Goal 1     DATA: Robert stated that she got into an argument with her  this past week  She explained that he was texting another girl which she is okay with, but that this girl called him a pet name which upset her  She stated that this exact thing happened when he cheated on her before  She expressed that she trusts him, but doesn't want him to put himself in a situation that will cause him to cheat as this has happened before  Robert expressed that she's been hanging out with a friend lately, but has been having trouble about thinking of the positive things when around her  She explained that this friend knows of her 's ex and every time they hang out she cant help but to associate her to this ex  We discussed the benefits of this relationship to her, which she couldn't identify any other then they are fundraising together  She explained that they don't typically talk about that when they are together as she continues to discuss the drama in her life  This provider encouraged her to set boundaries with this friend  During this session, this clinician used the following therapeutic modalities: Client-centered Therapy and Supportive Psychotherapy    Substance Abuse was not addressed during this session  If the client is diagnosed with a co-occurring substance use disorder, please indicate any changes in the frequency or amount of use: n/a  Stage of change for addressing substance use diagnoses: No substance use/Not applicable    ASSESSMENT:  Alana Darden presents with a Euthymic/ normal mood  her affect is Normal range and intensity, which is congruent, with her mood and the content of the session  The client has made progress on their goals      It appears that ailyn is struggling with flashbacks of when her  cheated on her before  She appears fearful it will happen again  Ty Gong presents with a none risk of suicide, none risk of self-harm, and none risk of harm to others  For any risk assessment that surpasses a "low" rating, a safety plan must be developed  A safety plan was indicated: no  If yes, describe in detail n/a    PLAN: Between sessions, Ty Gong will continue challenging her negative thoughts  At the next session, the therapist will use Client-centered Therapy and Supportive Psychotherapy to address anxiety and depression  Behavioral Health Treatment Plan and Discharge Planning: Ty Gong is aware of and agrees to continue to work on their treatment plan  They have identified and are working toward their discharge goals  yes    Visit start and stop times:    02/15/23  Start Time: 1210  Stop Time: 1251  Total Visit Time: 41 minutes     Virtual Regular Visit    Verification of patient location:    Patient is located in the following state in which I hold an active license [place]      Assessment/Plan:    Problem List Items Addressed This Visit        Other    Anxiety    Mild major depression ([facility] [address] ) - Primary       Goals addressed in session: Goal 1          Reason for visit is   Chief Complaint   Patient presents with   • Anxiety   • Depression        Encounter provider Evan Mi    Provider located at [address] [place] [address] [place] [address]  [phone]      Recent Visits  No visits were found meeting these conditions    Showing recent visits within past 7 days and meeting all other requirements  Today's Visits  Date Type Provider Dept   02/15/23 Telemedicine [address] [place] [address] today's visits and meeting all other requirements  Future Appointments  No visits were found meeting these conditions  Showing future appointments within next 150 days and meeting all other requirements       The patient was identified by name and date of birth  Ashli Marroquin was informed that this is a telemedicine visit and that the visit is being conducted throughthe [employer] platform  She agrees to proceed     My office door was closed  No one else was in the room  She acknowledged consent and understanding of privacy and security of the video platform  The patient has agreed to participate and understands they can discontinue the visit at any time  Patient is aware this is a billable service  HPI     Past Medical History:   Diagnosis Date   • Allergic rhinitis due to pollen     LAST ASSESSED [id number]   • Anxiety    • Depression    • Dysmenorrhea     LAST ASSESSED [id number]   • Globus sensation     LAST ASSESSED [id number]   • Wrist sprain     LAST ASSESSED [id number]       Past Surgical History:   Procedure Laterality Date   • NO PAST SURGERIES         Current Outpatient Medications   Medication Sig Dispense Refill   • Brexpiprazole (Rexulti) 1 MG tablet Take 1 tablet (1 mg total) by mouth daily (Patient not taking: Reported on 10/25/2022) 90 tablet 1   • norgestrel-ethinyl estradiol (Cryselle-28) 0 3 mg-30 mcg per tablet Take 1 tablet by mouth daily (Patient not taking: Reported on 1/16/2023) 84 tablet 4   • sertraline (Zoloft) 50 mg tablet Take 1 tablet (50 mg total) by mouth daily 90 tablet 1     No current facility-administered medications for this visit  Allergies   Allergen Reactions   • Fruit C [Ascorbate - Food Allergy]      Fruit- stomach pains    • Pollen Extract Allergic Rhinitis       Review of Systems    Video Exam    There were no vitals filed for this visit      Physical Exam

## 2023-03-01 ENCOUNTER — TELEMEDICINE (OUTPATIENT)
Dept: BEHAVIORAL/MENTAL HEALTH CLINIC | Facility: CLINIC | Age: 28
End: 2023-03-01

## 2023-03-01 DIAGNOSIS — F32.0 MILD MAJOR DEPRESSION (HCC): Primary | ICD-10-CM

## 2023-03-01 DIAGNOSIS — F41.9 ANXIETY: ICD-10-CM
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
## 2023-03-08 DIAGNOSIS — N92.6 MENSTRUAL PROBLEM: Primary | ICD-10-CM

## 2023-03-15 ENCOUNTER — TELEMEDICINE (OUTPATIENT)
Dept: BEHAVIORAL/MENTAL HEALTH CLINIC | Facility: CLINIC | Age: 28
End: 2023-03-15

## 2023-03-15 DIAGNOSIS — F32.0 MILD MAJOR DEPRESSION (HCC): Primary | ICD-10-CM

## 2023-03-15 DIAGNOSIS — F41.9 ANXIETY: ICD-10-CM

## 2023-03-15 NOTE — PSYCH
Behavioral Health Psychotherapy Progress Note    Psychotherapy Provided: Individual Psychotherapy     1  Mild major depression ([address] )        2  Anxiety            Goals addressed in session: Goal 1     DATA: Robert expressed that things have been stressful with her job and she's been thinking about looking for another job  Robert expressed that she has many different responsibilities at this job currently which she enjoys as she has been there for 7 years and has gained experience without a degree  She recognizes that if she leaves she won't be the same pay since she doesn't have a degree or training it  She expressed that a year ago she picked up more responsibilities which she hasn't been complaining about until now  She has needed support from her boss in order to complete some tasks  She doesn't feel they are being done right and doesn't feel comfortable proceeding  She identified that she has 2 options to take over more responsibility so that she can complete the tasks right or let someone else do it  She stated that she's fearful she will get fired or taken advantage of and is unsure what to do  This provider encouraged her to come up with a pros and cons list in order to decide what is best for her  During this session, this clinician used the following therapeutic modalities: Client-centered Therapy, Motivational Interviewing and Supportive Psychotherapy    Substance Abuse was not addressed during this session  If the client is diagnosed with a co-occurring substance use disorder, please indicate any changes in the frequency or amount of use: n/a  Stage of change for addressing substance use diagnoses: No substance use/Not applicable    ASSESSMENT:  Syed Christopher presents with a Euthymic/ normal mood  her affect is Normal range and intensity, which is congruent, with her mood and the content of the session  The client has made progress on their goals      Robert was able to identify some possible solutions, but struggling to make the right choice  Merissa Khan presents with a none risk of suicide, none risk of self-harm, and none risk of harm to others  For any risk assessment that surpasses a "low" rating, a safety plan must be developed  A safety plan was indicated: no  If yes, describe in detail n/a    PLAN: Between sessions, Merissa Khan will make a pros and cons list  At the next session, the therapist will use Client-centered Therapy, Motivational Interviewing and Supportive Psychotherapy to address her anxiety and depression  Behavioral Health Treatment Plan and Discharge Planning: Merissa Khan is aware of and agrees to continue to work on their treatment plan  They have identified and are working toward their discharge goals   yes    Visit start and stop times:    03/15/23  Start Time: 1065  Stop Time: 1245  Total Visit Time: 41 minutes

## 2023-03-16 ENCOUNTER — OFFICE VISIT (OUTPATIENT)
Dept: FAMILY MEDICINE CLINIC | Facility: CLINIC | Age: 28
End: 2023-03-16

## 2023-03-16 VITALS
HEIGHT: 65 IN | BODY MASS INDEX: 30.52 KG/M2 | SYSTOLIC BLOOD PRESSURE: 122 MMHG | TEMPERATURE: 99.6 F | RESPIRATION RATE: 16 BRPM | HEART RATE: 72 BPM | DIASTOLIC BLOOD PRESSURE: 80 MMHG | WEIGHT: 183.2 LBS | OXYGEN SATURATION: 98 %

## 2023-03-16 DIAGNOSIS — Z13.6 SCREENING FOR CARDIOVASCULAR CONDITION: ICD-10-CM

## 2023-03-16 DIAGNOSIS — F32.A ANXIETY AND DEPRESSION: ICD-10-CM

## 2023-03-16 DIAGNOSIS — Z13.1 SCREENING FOR DIABETES MELLITUS: ICD-10-CM

## 2023-03-16 DIAGNOSIS — R53.83 OTHER FATIGUE: ICD-10-CM

## 2023-03-16 DIAGNOSIS — F41.9 ANXIETY AND DEPRESSION: ICD-10-CM

## 2023-03-16 DIAGNOSIS — Z11.4 SCREENING FOR HIV (HUMAN IMMUNODEFICIENCY VIRUS): ICD-10-CM

## 2023-03-16 DIAGNOSIS — Z00.00 ANNUAL PHYSICAL EXAM: Primary | ICD-10-CM

## 2023-03-16 NOTE — PROGRESS NOTES
[address]    NAME: Deirdre Corbett  AGE: 24 y o  SEX: female  : 1995     DATE: 3/16/2023     Assessment and Plan:     Problem List Items Addressed This Visit    None  Visit Diagnoses     Annual physical exam    -  Primary    Robert is doing well  She is to continue a healthy, lower carb diet, and regular exercise  FBW ordered  Anxiety and depression        Stable on present management with Sertraline  Working with [address] Ramos  Screening for diabetes mellitus        Relevant Orders    Comprehensive metabolic panel    Screening for cardiovascular condition        Relevant Orders    Lipid Panel with Direct LDL reflex    Other fatigue        Relevant Orders    CBC and differential    Screening for HIV (human immunodeficiency virus)        Relevant Orders    HIV 1/2 AG/AB w Reflex [facility] for 2 yr old and above    BMI 30 0-30 9,adult        Diet and exercise as above  Immunizations and preventive care screenings were discussed with patient today  Appropriate education was printed on patient's after visit summary  Counseling:  Dental Health: discussed importance of regular tooth brushing, flossing, and dental visits  · Exercise: the importance of regular exercise/physical activity was discussed  Recommend exercise 3-5 times per week for at least 30 minutes  BMI Counseling: Body mass index is 30 49 kg/m²  The BMI is above normal  Nutrition recommendations include moderation in carbohydrate intake  Exercise recommendations include exercising 3-5 times per week  No pharmacotherapy was ordered  Patient referred to PCP  Rationale for BMI follow-up plan is due to patient being overweight or obese  Return in 6 months (on 2023) for Recheck       Chief Complaint:     Chief Complaint   Patient presents with   • Physical Exam     Patient is here today for an annual exam       History of Present Illness:     Adult Annual Physical   Patient here for a comprehensive physical exam  The patient reports no problems  Seeing Behavioral Health, and OB/GYN  Had normal TSH of 1 10 in 11/2022  Vaccinated against Javier Jimenez; Tdap in 2017  Normal PAP smear in 03/2022  Sertraline is working  Diet and Physical Activity  · Diet/Nutrition: well balanced diet  · Exercise: 3-4 times a week on average  Depression Screening  PHQ-2/9 Depression Screening         General Health  · Sleep: sleeps well  · Hearing: normal - bilateral   · Vision: no vision problems  · Dental: regular dental visits  /GYN Health  · Last menstrual period: Menses regular  · Contraceptive method: None     · History of STDs?: no      Review of Systems:     Review of Systems   Constitutional: Negative for activity change  Respiratory: Negative for shortness of breath  Cardiovascular: Negative for chest pain  Gastrointestinal: Negative for abdominal pain and blood in stool  Genitourinary: Negative for menstrual problem  No new breast lumps on self examination  Psychiatric/Behavioral: Negative for dysphoric mood  The patient is not nervous/anxious         Past Medical History:     Past Medical History:   Diagnosis Date   • Allergic rhinitis due to pollen     LAST ASSESSED [id number]   • Anxiety    • Depression    • Dysmenorrhea     LAST ASSESSED [id number]   • Globus sensation     LAST ASSESSED [id number]   • Wrist sprain     LAST ASSESSED [id number]      Past Surgical History:     Past Surgical History:   Procedure Laterality Date   • NO PAST SURGERIES        Social History:     Social History     Socioeconomic History   • Marital status: /Civil Union     Spouse name: None   • Number of children: None   • Years of education: None   • Highest education level: None   Occupational History   • None   Tobacco Use   • Smoking status: Never   • Smokeless tobacco: Never   Vaping Use   • Vaping Use: Never used Substance and Sexual Activity   • Alcohol use: Yes     Alcohol/week: 1 0 standard drink     Types: 1 Glasses of wine per week   • Drug use: No   • Sexual activity: Yes     Partners: Male     Birth control/protection: OCP     Comment: Lo loestrin   Other Topics Concern   • None   Social History Narrative    CAFFEINE USE    DAILY COFFEE     Social Determinants of Health     Financial Resource Strain: Not on file   Food Insecurity: Not on file   Transportation Needs: Not on file   Physical Activity: Not on file   Stress: Not on file   Social Connections: Not on file   Intimate Partner Violence: Not on file   Housing Stability: Not on file      Family History:     Family History   Problem Relation Age of Onset   • Other Mother         IDIOPATHIC THROMBOCYTOPENIC PURPURA   • Alcohol abuse Father    • Anemia Father    • Esophageal cancer Maternal Grandfather    • Bone cancer Maternal Grandfather    • Cancer Maternal Grandfather    • Multiple sclerosis Paternal Grandfather       Current Medications:     Current Outpatient Medications   Medication Sig Dispense Refill   • sertraline (Zoloft) 50 mg tablet Take 1 tablet (50 mg total) by mouth daily 90 tablet 1   • norethindrone (Aygestin) 5 mg tablet Take 2 tablets (10 mg total) by mouth daily for 10 days (Patient not taking: Reported on 3/16/2023) 20 tablet 0     No current facility-administered medications for this visit  Allergies: Allergies   Allergen Reactions   • Fruit C [Ascorbate - Food Allergy]      Fruit- stomach pains    • Pollen Extract Allergic Rhinitis      Physical Exam:     /80 (BP Location: Left arm, Patient Position: Sitting, Cuff Size: Adult)   Pulse 72   Temp 99 6 °F (37 6 °C) (Tympanic)   Resp 16   Ht 5' 5" (1 651 m)   Wt 83 1 kg (183 lb 3 2 oz)   SpO2 98%   BMI 30 49 kg/m²     Physical Exam  Vitals and nursing note reviewed  Constitutional:       General: She is not in acute distress  Appearance: Normal appearance   She is not ill-appearing, toxic-appearing or diaphoretic  HENT:      Head: Normocephalic and atraumatic  Right Ear: Tympanic membrane, ear canal and external ear normal       Left Ear: Tympanic membrane, ear canal and external ear normal       Mouth/Throat:      Mouth: Mucous membranes are moist       Pharynx: Oropharynx is clear  No oropharyngeal exudate or posterior oropharyngeal erythema  Eyes:      General: No scleral icterus  Conjunctiva/sclera: Conjunctivae normal    Cardiovascular:      Rate and Rhythm: Normal rate and regular rhythm  Heart sounds: Normal heart sounds  No murmur heard  No friction rub  No gallop  Pulmonary:      Effort: Pulmonary effort is normal  No respiratory distress  Breath sounds: Normal breath sounds  No stridor  No wheezing, rhonchi or rales  Abdominal:      General: Abdomen is flat  Bowel sounds are normal  There is no distension  Palpations: Abdomen is soft  There is no mass  Tenderness: There is no abdominal tenderness  There is no guarding or rebound  Musculoskeletal:      Cervical back: Normal range of motion and neck supple  No rigidity or tenderness  Lymphadenopathy:      Cervical: No cervical adenopathy  Neurological:      Mental Status: She is alert and oriented to person, place, and time  Psychiatric:         Mood and Affect: Mood normal          Behavior: Behavior normal          Thought Content: Thought content normal          Judgment: Judgment normal           AbranDeane was seen today for physical exam     Diagnoses and all orders for this visit:    Annual physical exam  Comments:  Robert is doing well  She is to continue a healthy, lower carb diet, and regular exercise  FBW ordered  Anxiety and depression  Comments:  Stable on present management with Sertraline  Working with [facility]  Screening for diabetes mellitus  -     Comprehensive metabolic panel;  Future    Screening for cardiovascular condition  - Lipid Panel with Direct LDL reflex; Future    Other fatigue  -     CBC and differential; Future    Screening for HIV (human immunodeficiency virus)  -     HIV 1/2 AG/AB w Reflex [facility] for 2 yr old and above; Future    BMI 30 0-30 9,adult  Comments:  Diet and exercise as above            [address] Ramos

## 2023-03-21 DIAGNOSIS — F41.8 DEPRESSION WITH ANXIETY: ICD-10-CM

## 2023-03-29 DIAGNOSIS — T75.3XXD MOTION SICKNESS, SUBSEQUENT ENCOUNTER: Primary | ICD-10-CM

## 2023-03-29 RX ORDER — SCOLOPAMINE TRANSDERMAL SYSTEM 1 MG/1
1 PATCH, EXTENDED RELEASE TRANSDERMAL
Qty: 5 PATCH | Refills: 0 | Status: SHIPPED | OUTPATIENT
Start: 2023-03-29 | End: 2023-04-28

## 2023-03-30 ENCOUNTER — SOCIAL WORK (OUTPATIENT)
Dept: BEHAVIORAL/MENTAL HEALTH CLINIC | Facility: CLINIC | Age: 28
End: 2023-03-30

## 2023-03-30 ENCOUNTER — ANNUAL EXAM (OUTPATIENT)
Dept: OBGYN CLINIC | Facility: CLINIC | Age: 28
End: 2023-03-30

## 2023-03-30 VITALS
SYSTOLIC BLOOD PRESSURE: 120 MMHG | BODY MASS INDEX: 30.26 KG/M2 | DIASTOLIC BLOOD PRESSURE: 86 MMHG | WEIGHT: 181.6 LBS | HEIGHT: 65 IN

## 2023-03-30 DIAGNOSIS — F41.9 ANXIETY: ICD-10-CM

## 2023-03-30 DIAGNOSIS — F32.0 MILD MAJOR DEPRESSION (HCC): Primary | ICD-10-CM

## 2023-03-30 DIAGNOSIS — Z01.419 ROUTINE GYNECOLOGICAL EXAMINATION: Primary | ICD-10-CM

## 2023-03-30 NOTE — PSYCH
Behavioral Health Psychotherapy Progress Note    Psychotherapy Provided: Individual Psychotherapy     1  Mild major depression ([address] )        2  Anxiety            Goals addressed in session: Goal 1     DATA: Robert expressed that she was able to resolve some of the conflicts at work for the time being as some of her responsibility is going to be shifted, but she is still worrying about many different things  Robert shared that she believes she's pregnant as she is 2 weeks late on he period, but hasn't taken a test  She identified that she sees her OBGYN this afternoon and will probably get a test there  She reported that she thought she would be happier with getting pregnant, but doesn't feel it's the time as she has many vacations she has planned  She explained that part of the reason why she didn't want to test was because she would need to figure out so many different things first such as work leave and her vacation time  This provider encouraged her to focus on what she can control vs  What she can't  She recognizes this and will attempt to do this, but has high anxiety when things are out of her control  She began discussing about her self-esteem and trying to loose weight  She finally felt that she was able to loose some weight and may have to stop due to being pregnant  During this session, this clinician used the following therapeutic modalities: Client-centered Therapy, Cognitive Behavioral Therapy and Supportive Psychotherapy    Substance Abuse was not addressed during this session  If the client is diagnosed with a co-occurring substance use disorder, please indicate any changes in the frequency or amount of use: n/a  Stage of change for addressing substance use diagnoses: No substance use/Not applicable    ASSESSMENT:  Pat Powell presents with a Anxious mood  her affect is Normal range and intensity, which is congruent, with her mood and the content of the session   The client has not made "progress on their goals  [place]dash was focusing on the future a lot during this session which was causing her increased anxiety  She had trouble staying present focused and continues to think of a plan for what could happen  Carlito Acosta presents with a none risk of suicide, none risk of self-harm, and none risk of harm to others  For any risk assessment that surpasses a \"low\" rating, a safety plan must be developed  A safety plan was indicated: no  If yes, describe in detail n/a    PLAN: Between sessions, Carlito Acosta will continue challenging her thoughts and working on her thoughts around nutrition  At the next session, the therapist will use Client-centered Therapy, Cognitive Behavioral Therapy and Supportive Psychotherapy to address her anxiety and depression  Behavioral Health Treatment Plan and Discharge Planning: Carlito Acosta is aware of and agrees to continue to work on their treatment plan  They have identified and are working toward their discharge goals   yes    Visit start and stop times:    03/30/23  Start Time: 1410  Stop Time: 1455  Total Visit Time: 45 minutes  "

## 2023-03-30 NOTE — PROGRESS NOTES
Assessment   24 y o  Taylor Jarquin presenting for annual exam      Plan   Diagnoses and all orders for this visit:    Routine gynecological examination  Normal findings on routine exam   Encouraged 150 min of exercise per week  Reviewed balanced diet  Multivitamin encouraged   Breast awareness/SBE encouraged         Pap - due   Mammo - baseline due at 38        RTO one year for yearly exam or sooner as needed  __________________________________________________________________    Jorge Polanco is a 24 y o  Taylor Jarquin presenting for annual exam  She is without complaint and does not want STD testing today  She and her now  are actively TTC  She has a hx of low AMH and FH of POF in her moth at age 34, and grandmother in her 35s  She is following with Dr Samuel Nipple  SCREENING  Last Pap: 2022 NILM  Last Mammo:n/a  Last Colonoscopy: n/a      GYN  Periods are regular, monthly, lasting 4 days  Not heavy  Dysmenorrhea:mild, occurring premenstrually  Cyclic symptoms include none    Sexually active: Yes - single partner -   Contraception: None - actively TTC  Hx STI: denies     Hx Abnormal pap: denies  We reviewed ASCCP guidelines for Pap testing today  Gardasil: She has completed the Gardasil series  Denies vaginal discharge, itching, odor, dyspareunia, pelvic pain and vulvar/vaginal symptoms      OB           Complaints: denies   Denies urgency, frequency, hematuria, leakage / change in stream, difficulty urinating  BREAST  Complaints: denies   Denies: breast lump, breast tenderness, nipple discharge, skin color change, and skin lesion(s)  Personal hx: none reported      Pertinent Family Hx:   Family hx of breast cancer: no  Family hx of ovarian cancer: no  Family hx of colon cancer: no      GENERAL  PMH reviewed/updated and is as below  Patient does follow with a PCP        Past Medical History:   Diagnosis Date   • Allergic rhinitis due to pollen     LAST ASSESSED [id number]   • Anxiety    • Depression    • Dysmenorrhea     LAST ASSESSED [id number]   • Globus sensation     LAST ASSESSED [id number]   • Wrist sprain     LAST ASSESSED [id number]       Past Surgical History:   Procedure Laterality Date   • NO PAST SURGERIES           Current Outpatient Medications:   •  sertraline (Zoloft) 50 mg tablet, Take 1 tablet (50 mg total) by mouth daily, Disp: 90 tablet, Rfl: 0  •  norethindrone (Aygestin) 5 mg tablet, Take 2 tablets (10 mg total) by mouth daily for 10 days (Patient not taking: Reported on 3/16/2023), Disp: 20 tablet, Rfl: 0  •  scopolamine (TRANSDERM-SCOP) 1 mg/3 days TD 72 hr patch, Place 1 patch on the skin over 72 hours every third day (Patient not taking: Reported on 3/30/2023), Disp: 5 patch, Rfl: 0    Allergies   Allergen Reactions   • Fruit C [Ascorbate - Food Allergy]      Fruit- stomach pains    • Pollen Extract Allergic Rhinitis       Social History     Socioeconomic History   • Marital status: /Civil Union     Spouse name: Not on file   • Number of children: Not on file   • Years of education: Not on file   • Highest education level: Not on file   Occupational History   • Not on file   Tobacco Use   • Smoking status: Never   • Smokeless tobacco: Never   Vaping Use   • Vaping Use: Never used   Substance and Sexual Activity   • Alcohol use: Not Currently     Alcohol/week: 1 0 standard drink     Types: 1 Glasses of wine per week   • Drug use: No   • Sexual activity: Yes     Partners: Male     Birth control/protection: None   Other Topics Concern   • Not on file   Social History Narrative    CAFFEINE USE    DAILY COFFEE     Social Determinants of Health     Financial Resource Strain: Not on file   Food Insecurity: Not on file   Transportation Needs: Not on file   Physical Activity: Not on file   Stress: Not on file   Social Connections: Not on file   Intimate Partner Violence: Not on file   Housing Stability: Not on file       Review of Systems "    ROS:  Constitutional: Negative for fatigue and unexpected weight change  Respiratory: Negative for cough and shortness of breath  Cardiovascular: Negative for chest pain and palpitations  Gastrointestinal: Negative for abdominal pain and change in bowel habits  Breasts:  Negative, other than as noted above  Genitourinary: Negative, other than as noted above  Psychiatric: Negative for mood difficulties  Objective      /86 (BP Location: Left arm, Patient Position: Sitting, Cuff Size: Standard)   Ht 5' 5\" (1 651 m)   Wt 82 4 kg (181 lb 9 6 oz)   LMP 02/22/2023   BMI 30 22 kg/m²     Physical Examination:    Patient appears well and is not in distress  Neck is supple without masses  Breasts are symmetrical without mass, tenderness, nipple discharge, skin changes or adenopathy  Abdomen is soft and nontender without masses  External genitals are normal without lesions or rashes  Urethral meatus and urethra are normal  Bladder is normal to palpation  Vagina is normal without discharge or bleeding  Cervix is normal without discharge or lesion  Uterus is normal, mobile, nontender without palpable mass  Adnexa are normal, nontender, without palpable mass                   "

## 2023-04-28 ENCOUNTER — ULTRASOUND (OUTPATIENT)
Dept: OBGYN CLINIC | Facility: CLINIC | Age: 28
End: 2023-04-28

## 2023-04-28 VITALS
HEIGHT: 65 IN | WEIGHT: 179 LBS | DIASTOLIC BLOOD PRESSURE: 62 MMHG | BODY MASS INDEX: 29.82 KG/M2 | SYSTOLIC BLOOD PRESSURE: 124 MMHG

## 2023-04-28 DIAGNOSIS — O21.9 NAUSEA AND VOMITING IN PREGNANCY: ICD-10-CM

## 2023-04-28 DIAGNOSIS — N91.1 SECONDARY AMENORRHEA: Primary | ICD-10-CM

## 2023-04-28 DIAGNOSIS — Z13.79 GENETIC SCREENING: ICD-10-CM

## 2023-04-28 RX ORDER — ONDANSETRON 4 MG/1
4 TABLET, ORALLY DISINTEGRATING ORAL EVERY 6 HOURS PRN
Qty: 20 TABLET | Refills: 0 | Status: SHIPPED | OUTPATIENT
Start: 2023-04-28 | End: 2023-04-29 | Stop reason: SDUPTHER

## 2023-04-28 NOTE — PROGRESS NOTES
"Assessment/Plan:    Secondary amenorrhea  SIUP @ 9w2d by LMP [facility] 2023    Genetic screening  -     Ambulatory Referral to Maternal Fetal Medicine; Future    Nausea and vomiting in pregnancy  -     ondansetron (ZOFRAN-ODT) 4 mg disintegrating tablet; Take 1 tablet (4 mg total) by mouth every 6 (six) hours as needed for nausea or vomiting          Marcelo Talley is a 24 y o   LMP 2023 who presents with amenorrhea and + urine hCG   +nausea and emesis in the morning  Leaving for an [place] in 2 days  Cycle length: regular  Pregnancy testing: at home  Pregnancy imaging: not done  Blood type: not documented  Other lab results: none  The following portions of the patient's history were reviewed and updated as appropriate: allergies, current medications, past family history, past medical history, past social history, past surgical history and problem list     Review of Systems  Pertinent items are noted in HPI  Objective      /62 (BP Location: Left arm, Patient Position: Sitting, Cuff Size: Large)   Ht 5' 5\" (1 651 m)   Wt 81 2 kg (179 lb)   LMP 2023 (Exact Date)   BMI 29 79 kg/m²     General: alert and oriented, in no acute distress   Heart: regular rate and rhythm   Lungs: effort normal   Abdomen: soft, non-tender, without masses or organomegaly   Vulva: normal       AMB US Pelvic Non OB    Date/Time: 2023 9:40 AM  Performed by: Tod Trejo MD  Authorized by: Tod Trejo MD   Universal Protocol:  Consent: Verbal consent obtained  Risks and benefits: risks, benefits and alternatives were discussed  Consent given by: patient  Time out: Immediately prior to procedure a \"time out\" was called to verify the correct patient, procedure, equipment, support staff and site/side marked as required    Patient understanding: patient states understanding of the procedure being performed  Patient identity confirmed: verbally with " patient      Procedure details:     Indications comment:  Amenorrhea, + urine hCG    Technique:  Transvaginal US, Non-OB    Position: lithotomy exam    Cervix findings:      closed  Left ovary findings:     Left ovary:  Not visualized    Cysts: not identified    Right ovary findings:     Right ovary:  Not visualized    Cysts: not identified    Other findings:     Free pelvic fluid: not identified    Post-Procedure Details:     Impression:  Single viable intrauterine gestation CRL 3 07cm (10w0d) c/w EGA of 9w2d by LMP   bpm     Tolerance:   Tolerated well, no immediate complications    Complications: no complications

## 2023-04-29 DIAGNOSIS — O21.9 NAUSEA AND VOMITING IN PREGNANCY: ICD-10-CM

## 2023-05-02 ENCOUNTER — TELEPHONE (OUTPATIENT)
Dept: PERINATAL CARE | Facility: OTHER | Age: 28
End: 2023-05-02

## 2023-05-02 RX ORDER — ONDANSETRON 4 MG/1
4 TABLET, ORALLY DISINTEGRATING ORAL EVERY 6 HOURS PRN
Qty: 20 TABLET | Refills: 0 | Status: SHIPPED | OUTPATIENT
Start: 2023-05-02

## 2023-05-02 NOTE — TELEPHONE ENCOUNTER
Called patient to schedule MFM appointment, based on referral issued to Maternal Fetal Medicine by [facility] office  Left voicemail requesting patient to call back and schedule appointment, with office number for return call [phone]

## 2023-05-04 ENCOUNTER — TELEPHONE (OUTPATIENT)
Facility: HOSPITAL | Age: 28
End: 2023-05-04

## 2023-05-04 NOTE — TELEPHONE ENCOUNTER
Called patient to schedule MFM appointment, based on referral issued to Maternal Fetal Medicine by [facility] office  Left voicemail requesting patient to call back and schedule appointment, with office number for return call [phone]

## 2023-05-09 ENCOUNTER — INITIAL PRENATAL (OUTPATIENT)
Dept: OBGYN CLINIC | Facility: CLINIC | Age: 28
End: 2023-05-09

## 2023-05-09 VITALS — WEIGHT: 179 LBS | BODY MASS INDEX: 29.79 KG/M2

## 2023-05-09 DIAGNOSIS — Z83.2 FAMILY HISTORY OF ITP: Primary | ICD-10-CM

## 2023-05-09 DIAGNOSIS — Z34.01 ENCOUNTER FOR SUPERVISION OF NORMAL FIRST PREGNANCY IN FIRST TRIMESTER: ICD-10-CM

## 2023-05-09 NOTE — PATIENT INSTRUCTIONS
Congratulations!! Please review our Pregnancy Essential Guide and [facility] L&D Virtual tour from our [employer]  [facility] Pregnancy Essentials Guide    [facility] ([address] [facility])     [address] ([employer])        Pregnancy at 11 to 14 Weeks   [address]:   You are now at the end of your first trimester and entering your second trimester  Morning sickness usually goes away by this time  You may have other symptoms such as fatigue, frequent urination, and headaches  You may have gained 2 to 4 pounds by now  DISCHARGE INSTRUCTIONS:   Return to the emergency department if:   You have pain or cramping in your abdomen or low back  You have heavy vaginal bleeding or clotting  You pass material that looks like tissue or large clots  Collect the material and bring it with you  Call your doctor or obstetrician if:   You cannot keep food or drinks down, and you are losing weight  You have light bleeding  You have chills or a fever  You have vaginal itching, burning, or pain  You have yellow, green, white, or foul-smelling vaginal discharge  You have pain or burning when you urinate, less urine than usual, or pink or bloody urine  You have questions or concerns about your condition or care  How to care for yourself at this stage of your pregnancy:       Get plenty of rest   You may feel more tired than normal  You may need to take naps or go to bed earlier  Manage nausea and vomiting  Avoid fatty and spicy foods  Eat small meals throughout the day instead of large meals  Bessy may help to decrease nausea  Ask your healthcare provider about other ways of decreasing nausea and vomiting  Eat a variety of healthy foods  Healthy foods include fruits, vegetables, whole-grain breads, low-fat dairy foods, beans, lean meats, and fish  Drink liquids as directed   Ask how much liquid to drink each day and which liquids are best for you  Limit caffeine to less than 200 milligrams each day  Limit your intake of fish to 2 servings each week  Choose fish low in mercury such as canned light tuna, shrimp, salmon, cod, or tilapia  Do not  eat fish high in mercury such as swordfish, tilefish, steven mackerel, and shark  Take prenatal vitamins as directed  Your need for certain vitamins and minerals, such as folic acid, increases during pregnancy  Prenatal vitamins provide some of the extra vitamins and minerals you need  Prenatal vitamins may also help to decrease the risk of certain birth defects  Do not smoke  Smoking increases your risk of a miscarriage and other health problems during your pregnancy  Smoking can cause your baby to be born too early or weigh less at birth  Ask your healthcare provider for information if you need help quitting  Do not drink alcohol  Alcohol passes from your body to your baby through the placenta  It can affect your baby's brain development and cause fetal alcohol syndrome (FAS)  FAS is a group of conditions that causes mental, behavior, and growth problems  Talk to your healthcare provider before you take any medicines  Many medicines may harm your baby if you take them when you are pregnant  Do not take any medicines, vitamins, herbs, or supplements without first talking to your healthcare provider  Never use illegal or street drugs (such as marijuana or cocaine) while you are pregnant  Safety tips during pregnancy:   Avoid hot tubs and saunas  Do not use a hot tub or sauna while you are pregnant, especially during your first trimester  Hot tubs and saunas may raise your baby's temperature and increase the risk of birth defects  Avoid toxoplasmosis  This is an infection caused by eating raw meat or being around infected cat feces  It can cause birth defects, miscarriages, and other problems  Wash your hands after you touch raw meat  Make sure any meat is well-cooked before you eat it  Avoid raw eggs and unpasteurized milk  Use gloves or ask someone else to clean your cat's litter box while you are pregnant  Changes happening with your baby: Your baby has fully formed fingernails and toenails  Your baby's heartbeat can now be heard  Ask your healthcare provider if you can listen to your baby's heartbeat  By week 14, your baby is over 4 inches long from the top of the head to the rump (baby's bottom)  Your baby weighs over 3 ounces  Prenatal care:  Prenatal care is a series of visits with your healthcare provider throughout your pregnancy  During the first 28 weeks of your pregnancy, you will see your healthcare provider 1 time each month  Prenatal care can help prevent problems during pregnancy and childbirth  Your healthcare provider will check your blood pressure and weight  Your baby's heart rate will also be checked  You may also need the following at some visits:  A pelvic exam  allows your healthcare provider to see your cervix (the bottom part of your uterus)  Your healthcare provider will use a speculum to open your vagina  He or she will check the size and shape of your uterus  Blood tests  may be done to check for any of the following:    Gestational diabetes or anemia (low iron level)    Blood type or Rh factor, or certain birth defects    Immunity to certain diseases, such as chickenpox or rubella    An infection, such as a sexually transmitted infection, HIV, or hepatitis B    Hepatitis B  may need to be prevented or treated  Hepatitis B is inflammation of the liver caused by the hepatitis B virus (HBV)  HBV can spread from a mother to her baby during delivery  You will be checked for HBV as early as possible in the first trimester of each pregnancy  You need the test even if you received the hepatitis B vaccine or were tested before  You may need to have an HBV infection treated before you give birth  Urine tests  may also be done to check for sugar and protein   These can be signs of gestational diabetes or preeclampsia  Urine tests may also be done to check for signs of infection  A fetal ultrasound  shows pictures of your baby inside your uterus  The pictures are used to check your baby's development, movement, and position  Genetic disorder screening tests  may be offered to you  These tests check your baby's risk for genetic disorders such as Down syndrome  A screening test includes a blood test and ultrasound  Follow up with your doctor or obstetrician as directed:  Go to all prenatal visits  Write down your questions so you remember to ask them during your visits  © Copyright [employer] [facility] 2022 Information is for End User's use only and may not be sold, redistributed or otherwise used for commercial purposes  The above information is an  only  It is not intended as medical advice for individual conditions or treatments  Talk to your doctor, nurse or pharmacist before following any medical regimen to see if it is safe and effective for you

## 2023-05-09 NOTE — PROGRESS NOTES
OB INTAKE INTERVIEW  Patient is 24years old who presents for OB intake at 11 weeks  She is accompanied by self via telephone during this encounter  The father of her baby Barby Hinojosa) is involved in the pregnancy and is 32years old  This is first baby for both     Last Menstrual Period: 23  Ultrasound: Measured 9w 2d on 23  Estimated Date of Delivery: 23 consistent with LMP confirmed by US    Signs/Symptoms of Pregnancy  Current pregnancy symptoms: Nausea without emesis in a m  Advised small frequent meals  Constipation Yes  Advised 60 to 80 oz water per day, increase fiber , exercise  Stool softener if needed  Headaches no  Cramping/spotting no  PICA cravings no    Diabetes-  There is no height or weight on file to calculate BMI  If patient has 1 or more, please order early 1 hour GTT  History of GDM no  BMI >35 no  History of PCOS or current metformin use no  History of LGA/macrosomic infant (4000g/9lbs) no    If patient has 2 or more, please order early 1 hour GTT  BMI>30 YES  AMA no  First degree relative with type 2 diabetes no  History of chronic HTN, hyperlipidemia, elevated A1C no  High risk race (, , ,  or ) no    Hypertension- if you answer yes, please order preeclampsia labs (cbc, comprehensive metabolic panel, urine protein creatinine ratio, uric acid)  History of of chronic HTN no  History of gestational HTN no  History of preeclampsia, eclampsia, or HELLP syndrome no  History of diabetes no  History of lupus, autoimmune disease, kidney disease no    Thyroid- if yes order TSH with reflex T4  History of thyroid disease no    Bleeding Disorder or Hx of DVT-patient or first degree relative with history of  Order the following if not done previously  (Factor V, antithrombin III, prothrombin gene mutation, protein C and S Ag, lupus anticoagulant, anticardiolipin, beta-2 glycoprotein)   YES  Mother has ITP       OB/GYN-  History of abnormal pap smear no       Date of last pap smear 3/21/22 WNL  History of HPV no  History of Herpes/HSV no  History of other STI (gonorrhea, chlamydia, trich) no  History of prior  no  History of prior  no  History of  delivery prior to 36 weeks 6 days no  History of blood transfusion no  Ok for blood transfusion Yes    Substance screening- if yes outside of tobacco for her or anyone in her home-order urine drug screen  History of tobacco use no  Currently using tobacco no  Currently using alcohol no  Presently using drugs no  Past drug use  no  IV drug use- no  Partner drug use no  Parent/Family drug use no    MRSA Screening-   Does the pt have a hx of MRSA? no  If yes- please follow MRSA protocol and obtain a nasal swab for MRSA culture    Immunizations:  Influenza vaccine given this season No  Does not usually get flu vaccine  Discussed Tdap vaccine Yes  Discussed COVID Vaccine Yes  Recieved    Genetic/MFM-  Do you or your partner have a history of any of the following in yourselves or first degree relatives? Cystic fibrosis no  Spinal muscular atrophy no  Hemoglobinopathy/Sickle Cell/Thalassemia no  Fragile X Intellectual Disability no    If yes, discuss carrier screening and recommend consultation with MFM/genetic counseling  If no, discuss option for carrier screening and/or genetic testing with Nuchal Ultrasound   Patient interested Yes  Appointment at [place] [id number] made Yes    Interview education  [facility] Pregnancy Essentials Book reviewed, discussed and attached to their AVS Yes    Nurse/Family Partnership- patient may qualify No; referral placed No    Prenatal lab work scripts Yes  Extra labs ordered: Yes, Thrombotic Panel    Aspirin/Preeclampsia Screen    Risk Level Risk Factor Recommendation   LOW Prior Uncomplicated full-term delivery no No Aspirin recommendation        MODERATE Nulliparity YES Recommend low-dose aspirin if     BMI>30 no 2 or more moderate risk factors    Family History Preeclampsia (mother/sister) no     35yr old or greater no      or Low Socioeconomic no     IVF Pregnancy  no     Personal History Risks (low birth weight, prior adverse preg outcome, >10yr preg interval) no         HIGH History of Preeclampsia no Recommend low-dose aspirin if     Multifetal gestation no 1 or more high risk factors    Chronic HTN no     Type 1 or 2 Diabetes no     Renal Disease no     Autoimmune Disease  no      Contraindications to ASA therapy:  NSAID/ ASA allergy: no  Nasal polyps: no  Asthma with history of ASA induced bronchospasm: no  Relative contraindications:  History of GI bleed: no  Active peptic ulcer disease: no  Severe hepatic dysfunction: no    Patient should be recommended to take ASA 162mg during this pregnancy from 12-36wks to lower her risk of preeclampsia: No      The patient has a history now or in prior pregnancy notable for:    Nulliparity  Family History of ITP in Mother    Details that I feel the provider should be aware of: Same    Pregnancy planned and welcomed for Robert and her  Isela Ayon  This is first baby for both  [facility] works for accounts payable for [employer]  Isela Ayon is a  for [facility] [employer] [facility]  Couple just recently returned from [place]!!! Robert is feeling well overall  Excited about pregnancy  Interested in NIPTs testing, referral placed  PN1 visit scheduled  Prenatal labs ordered  Encouraged to complete prior to next visit  Plans to breastfeed  Reviewed breast pump order process  The patient was oriented to our practice, reviewed delivering physicians and [facility] for Delivery  All questions were answered  Encouraged use of My Chart, however advised not to use for urgent pregnancy concerns      Interviewed by: Go Ambrocio, SHANTALN,RN    [facility]

## 2023-05-10 ENCOUNTER — APPOINTMENT (OUTPATIENT)
Dept: LAB | Age: 28
End: 2023-05-10

## 2023-05-10 DIAGNOSIS — Z34.01 ENCOUNTER FOR SUPERVISION OF NORMAL FIRST PREGNANCY IN FIRST TRIMESTER: ICD-10-CM

## 2023-05-10 LAB
ABO GROUP BLD: NORMAL
BACTERIA UR QL AUTO: ABNORMAL /HPF
BASOPHILS # BLD AUTO: 0.04 THOUSANDS/ÂΜL (ref 0–0.1)
BASOPHILS NFR BLD AUTO: 0 % (ref 0–1)
BILIRUB UR QL STRIP: NEGATIVE
BLD GP AB SCN SERPL QL: NEGATIVE
CLARITY UR: CLEAR
COLOR UR: ABNORMAL
EOSINOPHIL # BLD AUTO: 0.17 THOUSAND/ÂΜL (ref 0–0.61)
EOSINOPHIL NFR BLD AUTO: 2 % (ref 0–6)
ERYTHROCYTE [DISTWIDTH] IN BLOOD BY AUTOMATED COUNT: 12 % (ref 11.6–15.1)
GLUCOSE UR STRIP-MCNC: NEGATIVE MG/DL
HBV SURFACE AG SER QL: NORMAL
HCT VFR BLD AUTO: 39.1 % (ref 34.8–46.1)
HCV AB SER QL: NORMAL
HGB BLD-MCNC: 13.5 G/DL (ref 11.5–15.4)
HGB UR QL STRIP.AUTO: NEGATIVE
HIV 1+2 AB+HIV1 P24 AG SERPL QL IA: NORMAL
HIV 2 AB SERPL QL IA: NORMAL
HIV1 AB SERPL QL IA: NORMAL
HIV1 P24 AG SERPL QL IA: NORMAL
IMM GRANULOCYTES # BLD AUTO: 0.03 THOUSAND/UL (ref 0–0.2)
IMM GRANULOCYTES NFR BLD AUTO: 0 % (ref 0–2)
KETONES UR STRIP-MCNC: NEGATIVE MG/DL
LEUKOCYTE ESTERASE UR QL STRIP: ABNORMAL
LYMPHOCYTES # BLD AUTO: 2.5 THOUSANDS/ÂΜL (ref 0.6–4.47)
LYMPHOCYTES NFR BLD AUTO: 28 % (ref 14–44)
MCH RBC QN AUTO: 31 PG (ref 26.8–34.3)
MCHC RBC AUTO-ENTMCNC: 34.5 G/DL (ref 31.4–37.4)
MCV RBC AUTO: 90 FL (ref 82–98)
MONOCYTES # BLD AUTO: 0.56 THOUSAND/ÂΜL (ref 0.17–1.22)
MONOCYTES NFR BLD AUTO: 6 % (ref 4–12)
MUCOUS THREADS UR QL AUTO: ABNORMAL
NEUTROPHILS # BLD AUTO: 5.73 THOUSANDS/ÂΜL (ref 1.85–7.62)
NEUTS SEG NFR BLD AUTO: 64 % (ref 43–75)
NITRITE UR QL STRIP: NEGATIVE
NON-SQ EPI CELLS URNS QL MICRO: ABNORMAL /HPF
NRBC BLD AUTO-RTO: 0 /100 WBCS
PH UR STRIP.AUTO: 6.5 [PH]
PLATELET # BLD AUTO: 345 THOUSANDS/UL (ref 149–390)
PMV BLD AUTO: 9.4 FL (ref 8.9–12.7)
PROT UR STRIP-MCNC: NEGATIVE MG/DL
RBC # BLD AUTO: 4.36 MILLION/UL (ref 3.81–5.12)
RBC #/AREA URNS AUTO: ABNORMAL /HPF
RH BLD: POSITIVE
SP GR UR STRIP.AUTO: 1.01 (ref 1–1.03)
TREPONEMA PALLIDUM IGG+IGM AB [PRESENCE] IN SERUM OR PLASMA BY IMMUNOASSAY: NORMAL
UROBILINOGEN UR STRIP-ACNC: <2 MG/DL
WBC # BLD AUTO: 9.03 THOUSAND/UL (ref 4.31–10.16)
WBC #/AREA URNS AUTO: ABNORMAL /HPF

## 2023-05-11 LAB
BACTERIA UR CULT: NORMAL
RUBV IGG SERPL IA-ACNC: 33.4 IU/ML

## 2023-05-17 ENCOUNTER — INITIAL PRENATAL (OUTPATIENT)
Dept: OBGYN CLINIC | Facility: CLINIC | Age: 28
End: 2023-05-17

## 2023-05-17 VITALS — WEIGHT: 182 LBS | DIASTOLIC BLOOD PRESSURE: 82 MMHG | BODY MASS INDEX: 30.29 KG/M2 | SYSTOLIC BLOOD PRESSURE: 120 MMHG

## 2023-05-17 DIAGNOSIS — F41.8 DEPRESSION WITH ANXIETY: ICD-10-CM

## 2023-05-17 DIAGNOSIS — Z11.3 SCREENING FOR STDS (SEXUALLY TRANSMITTED DISEASES): ICD-10-CM

## 2023-05-17 DIAGNOSIS — Z3A.12 12 WEEKS GESTATION OF PREGNANCY: Primary | ICD-10-CM

## 2023-05-17 DIAGNOSIS — Z34.01 ENCOUNTER FOR SUPERVISION OF NORMAL FIRST PREGNANCY IN FIRST TRIMESTER: ICD-10-CM

## 2023-05-17 LAB
SL AMB  POCT GLUCOSE, UA: NORMAL
SL AMB POCT URINE PROTEIN: NORMAL

## 2023-05-17 NOTE — PROGRESS NOTES
Patient is here for Initial PN 1 visit  She states she has cramping occasionally; denies spotting  12wks/0days  JOEL: 11/29/23  Last pap: 3/21/22 Pap: neg/HPV: N/A  GC/CH collected:yes  PN 1 labs completed  Flu vaccine: N/A   Covid vaccine: N/A  Urine: protein: neg/glucose: neg  Blue information packet given: yes and reviewed  Nuchal: 5/24/23  Level II US scheduled: 7/12/23

## 2023-05-17 NOTE — PROGRESS NOTES
24 y o  Michelle Jorge at 12w0d by LMP and consistent w/ 1st trimester US  She denies significant nausea/vomiting  Some round ligament pain  No bleeding/cramping  Prenatal labs: normal  Has order for thrombotic panel, which she will need to go back for and is planning to obtain  Genetic screening: Scheduled with [facility] for sequential screen and Level II 23    OB history:     GYN history: Last pap smear 3/21/22 NILM  Denies history of STDs  Past medical history:   Depression with anxiety  Insomnia    Past surgical history:     Past Surgical History:   Procedure Laterality Date   • NO PAST SURGERIES         Social history: Denies tobacco, alcohol, or illicit drug use  Family history:   Family History   Problem Relation Age of Onset   • Other Mother         IDIOPATHIC THROMBOCYTOPENIC PURPURA   • Alcohol abuse Father    • Anemia Father    • No Known Problems Sister    • No Known Problems Maternal Grandmother    • Esophageal cancer Maternal Grandfather    • Bone cancer Maternal Grandfather    • Cancer Maternal Grandfather    • No Known Problems Paternal Grandmother    • Multiple sclerosis Paternal Grandfather         Physical exam:     Fetal heart tones: 160    Patient appears well and is not in distress  Abdomen is soft and nontender without masses  External genitals are normal without lesions or rashes  Vagina is normal without discharge or bleeding  Cervix is normal without discharge or lesion  Uterus is normal for gestational age  Adnexa are normal, nontender, without palpable mass  Discussed as well during this visit was diet, prenatal vitamins, prenatal visits, lab testing, breast feeding, vaccinations, maternal fetal medicine consultations, and lifestyle  ASSESSMENT/PLAN   Problem List Items Addressed This Visit        Other    Depression with anxiety     Hx of depression with anxiety  Previously tx with lexapro   Currently well controlled and not requiring medication tx  reviewed potential for recurrences in pregnancy and postpartum  Reviewed medications safe in pregnancy  Discussed resources available [facility]  She was encouraged to call office with any worsening or change in symptoms  12 weeks gestation of pregnancy - Primary     First OB labs - normal  Needs to obtain thrombophilia panel   Gc/chlamydia - collected 5/17/23  Pap - UTD 3/21/22 NILM  Blue folder - given and reviewed    NT scan scheduled 5/24/23  Level II scheduled 7/12/23           Other Visit Diagnoses     Encounter for supervision of normal first pregnancy in first trimester        Relevant Orders    POCT urine dip (Completed)    Screening for STDs (sexually transmitted diseases)        Relevant Orders    Chlamydia/GC amplified DNA by PCR          Blue packet given and reviewed     All questions were answered & [place] expressed understanding

## 2023-05-18 ENCOUNTER — SOCIAL WORK (OUTPATIENT)
Dept: BEHAVIORAL/MENTAL HEALTH CLINIC | Facility: CLINIC | Age: 28
End: 2023-05-18

## 2023-05-18 DIAGNOSIS — F41.9 ANXIETY: Primary | ICD-10-CM

## 2023-05-18 PROBLEM — Z3A.12 12 WEEKS GESTATION OF PREGNANCY: Status: ACTIVE | Noted: 2023-05-18

## 2023-05-18 PROBLEM — T75.3XXA MOTION SICKNESS: Status: RESOLVED | Noted: 2017-12-27 | Resolved: 2023-05-18

## 2023-05-18 NOTE — ASSESSMENT & PLAN NOTE
First OB labs - normal  Needs to obtain thrombophilia panel   Gc/chlamydia - collected 5/17/23  Pap - UTD 3/21/22 NILM  Blue folder - given and reviewed    NT scan scheduled 5/24/23  Level II scheduled 7/12/23

## 2023-05-18 NOTE — PSYCH
Behavioral Health Psychotherapy Progress Note    Psychotherapy Provided: Individual Psychotherapy     1  Anxiety            Goals addressed in session: Goal 1     DATA: Robert arrived for her individual session today  She expressed that she is pregnant and processed through how she found out and how she told her   She identified that she has another vacation this year and identified how difficult it has been to process through this as she planned these trips and can't drink  She was coming to the realization that it was just going to be her  and her  She didn't realize she would get pregnant so soon  We processed through her emotions and acceptance of the baby  Robert shared how her friend has been struggling with her own mental health and that she finally went inpatient  Robert wasn't sure how to help her as she felt that she was looking for money  Robert expressed anxiety surrounding a plan of maturity leave and how things are not set in stone yet  We discussed challenging her negative thoughts and how she can have a conversation with her boss about this  During this session, this clinician used the following therapeutic modalities: Client-centered Therapy, Cognitive Behavioral Therapy and Supportive Psychotherapy    Substance Abuse was not addressed during this session  If the client is diagnosed with a co-occurring substance use disorder, please indicate any changes in the frequency or amount of use: n/a  Stage of change for addressing substance use diagnoses: No substance use/Not applicable    ASSESSMENT:  Ben Frausto presents with a Euthymic/ normal mood  her affect is Normal range and intensity, which is congruent, with her mood and the content of the session  The client has made progress on their goals  Robert appears to struggle when things are not planned out accordingly and when things are beyond her control  This seems to increase her anxiety   Ben Frausto presents "with a none risk of suicide, none risk of self-harm, and none risk of harm to others  For any risk assessment that surpasses a \"low\" rating, a safety plan must be developed  A safety plan was indicated: no  If yes, describe in detail n/a    PLAN: Between sessions, Daniella Boyle will continue challenging her negative thoughts  At the next session, the therapist will use Client-centered Therapy, Cognitive Behavioral Therapy and Supportive Psychotherapy to address her anxiety  Behavioral Health Treatment Plan and Discharge Planning: Daniella Boyle is aware of and agrees to continue to work on their treatment plan  They have identified and are working toward their discharge goals   yes    Visit start and stop times:    05/18/23  Start Time: 1605  Stop Time: 5481  Total Visit Time: 45 minutes  "

## 2023-05-18 NOTE — ASSESSMENT & PLAN NOTE
Hx of depression with anxiety  Previously tx with lexapro  Currently well controlled and not requiring medication tx  reviewed potential for recurrences in pregnancy and postpartum  Reviewed medications safe in pregnancy  Discussed resources available [facility]  She was encouraged to call office with any worsening or change in symptoms

## 2023-05-19 LAB
C TRACH DNA SPEC QL NAA+PROBE: NEGATIVE
N GONORRHOEA DNA SPEC QL NAA+PROBE: NEGATIVE

## 2023-05-24 ENCOUNTER — ROUTINE PRENATAL (OUTPATIENT)
Facility: HOSPITAL | Age: 28
End: 2023-05-24
Attending: OBSTETRICS & GYNECOLOGY

## 2023-05-24 VITALS
HEART RATE: 99 BPM | HEIGHT: 65 IN | SYSTOLIC BLOOD PRESSURE: 124 MMHG | BODY MASS INDEX: 30.22 KG/M2 | DIASTOLIC BLOOD PRESSURE: 72 MMHG | WEIGHT: 181.4 LBS

## 2023-05-24 DIAGNOSIS — Z36.82 ENCOUNTER FOR (NT) NUCHAL TRANSLUCENCY SCAN: Primary | ICD-10-CM

## 2023-05-24 DIAGNOSIS — Z3A.13 13 WEEKS GESTATION OF PREGNANCY: ICD-10-CM

## 2023-05-24 DIAGNOSIS — Z13.79 GENETIC SCREENING: ICD-10-CM

## 2023-05-24 DIAGNOSIS — O21.9 NAUSEA AND VOMITING IN PREGNANCY: ICD-10-CM

## 2023-05-24 RX ORDER — ONDANSETRON 4 MG/1
4 TABLET, ORALLY DISINTEGRATING ORAL EVERY 6 HOURS PRN
Qty: 20 TABLET | Refills: 0 | Status: SHIPPED | OUTPATIENT
Start: 2023-05-24

## 2023-05-24 NOTE — LETTER
"May 24, 2023     Beatrice Castañeda, [address]    Patient: Alvaro Bean   YOB: 1995   Date of Visit: 2023       Dear Dr Nara Benz: Thank you for referring Alvaro Bean to me for evaluation  Below are my notes for this consultation  If you have questions, please do not hesitate to call me  I look forward to following your patient along with you  Sincerely,        Anders Ramirez MD        CC: No Recipients    Anders Ramirez MD  2023 10:52 AM  Sign when Signing Visit  [address]: Ms Corey Mancia was seen today for nuchal translucency ultrasound  See ultrasound report under \"OB Procedures\" tab  Physical Exam  Constitutional:       General: She is not in acute distress  Appearance: Normal appearance  HENT:      Head: Normocephalic and atraumatic  Eyes:      Extraocular Movements: Extraocular movements intact  Cardiovascular:      Rate and Rhythm: Normal rate  Pulmonary:      Effort: Pulmonary effort is normal  No respiratory distress  Skin:     Findings: No erythema or rash  Neurological:      Mental Status: She is alert and oriented to person, place, and time  Psychiatric:         Mood and Affect: Mood normal          Behavior: Behavior normal          Please don't hesitate to contact our office with any concerns or questions    -Anders Ramirez MD           "

## 2023-05-24 NOTE — PROGRESS NOTES
"[address] [facility] Ms Debra Swartz was seen today for nuchal translucency ultrasound  See ultrasound report under \"OB Procedures\" tab  Physical Exam  Constitutional:       General: She is not in acute distress  Appearance: Normal appearance  HENT:      Head: Normocephalic and atraumatic  Eyes:      Extraocular Movements: Extraocular movements intact  Cardiovascular:      Rate and Rhythm: Normal rate  Pulmonary:      Effort: Pulmonary effort is normal  No respiratory distress  Skin:     Findings: No erythema or rash  Neurological:      Mental Status: She is alert and oriented to person, place, and time  Psychiatric:         Mood and Affect: Mood normal          Behavior: Behavior normal          Please don't hesitate to contact our office with any concerns or questions    -Prosper Kebede MD      "

## 2023-05-24 NOTE — PROGRESS NOTES
"Patient chose to have [facility] Non-invasive Prenatal Screen with fetal sex  Patient given brochure and is aware [facility] will contact their insurance and coordinate coverage  Patient made aware she will need to respond to text message or e-mail from [employer] within 2 business days or testing will be run through insurance  Patient informed text message will come from area code  \"415\"  Provided [employer] # [phone] and web site : [email or web address] [email or web address]  \"[place] your test online\" card with barcode and test tube ID provided to patient  Reviewed [facility]'s web site states 5-7 business days for results via their portal    [employer] message will be sent to patient when MFM receives results /provider reviews  2 vials of blood drawn from LEFT arm by Antonio Knutson MA  Patient tolerated blood draw without difficulty  Specimens labeled with patient identifiers (name, date of birth, specimen collection date), order and specimen were verified with patient, packed and sent via [employer]  Copy of lab order scanned to Epic media  Maternal Fetal Medicine will have results in approximately 7-10 business days and will call patient or notify via [address]  Patient aware viewing lab result online will reveal fetal sex if ordered  Patient verbalized understanding of all instructions and no questions at this time    "

## 2023-06-01 ENCOUNTER — SOCIAL WORK (OUTPATIENT)
Dept: BEHAVIORAL/MENTAL HEALTH CLINIC | Facility: CLINIC | Age: 28
End: 2023-06-01

## 2023-06-01 DIAGNOSIS — F41.8 DEPRESSION WITH ANXIETY: Primary | ICD-10-CM

## 2023-06-01 NOTE — BH CRISIS PLAN
Client Name: Rosy Ormond       Client YOB: 1995  : 1995    Treatment Team (include name and contact information):     Psychotherapist: Radha Pizarro Provider  Anneliese Gibbs, [address] [place] [address] [place]   [phone]    Type of Plan   * Child plans (children 15 yo and younger) must be completed and signed by the child's legal guardian   * Plans for all individuals 15 yo and above must be signed by the client  Plan Type: adolescent/adult (15 and over) Initial      My Personal Strengths are (in the client's own words):  Keeping calm in stressful situations     The stressors and triggers that may put me at risk are:  other (describe) things I can't contol, my past     Coping skills I can use to keep myself calm and safe: Other (describe) breathing/deep breaths     Coping skills/supports I can use to maintain abstinence from substance use:   n/a    The people that provide me with help and support: (Include name, contact, and how they can help)   Support person #1: Del ()    * Phone number: in her phone    * How can they help me? Listen to me    In the past, the following has helped me in times of crisis:    Other (describe) breathing/deep breaths      If it is an emergency and you need immediate help, call     If there is a possibility of danger to yourself or others, call the following crisis hotline resources:     Adult Crisis Numbers  Suicide Prevention Hotline - Dial   [facility]: Pattie Cooper 13: R Alondra 56: [address]: [phone]  [address] [address] ([address]): [address] [place] [address] Street: [address] [place] [address] Avenue: [address] [place] [address] St: [phone] (daytime)         [phone] (after hours, weekends, holidays)     Child/Adolescent Crisis Numbers   [facility]: Moon Brown 10: [phone]   Shima Bruce: [phone]   [place] [facility]: [phone]    Please note: Some counties do not have a separate number for Child/Adolescent specific crisis  If your county is not listed under Child/Adolescent, please call the adult number for your county     National Talk to Text Line   [employer] - [phone]    In the event your feelings become unmanageable, and you cannot reach your support system, you will call 911 immediately or go to the nearest hospital emergency room

## 2023-06-01 NOTE — PSYCH
Behavioral Health Psychotherapy Progress Note    Psychotherapy Provided: Individual Psychotherapy     1  Depression with anxiety            Goals addressed in session: Goal 1     DATA: Robert and this provider updated her treatment plan and created her crisis plan  She expressed that she's been thinking about cutting her sister out of her life as she doesn't always agree with her parenting choices that she makes with her children  We processed through this discussing a pattern of how Robert will cut others out of her life so that she doesn't continue to get hurt by them  She shared that she set a boundary with her grandmother and she said something and felt guilty for what she said  She stated that it's easy to cut people out then continue being hurt  This provider encouraged her to work on changing her thought process of how she is perceiving others  We discussed changing how others present themselves to her  Britttany shared how stressed she is about finding a  as she doesn't feel they can afford it and she will have to get a part time job or her  will have to work overnights, but she doesn't want that to happen  She stated that it goes against her values  We discussed possible solutions and this provider validated her emotions  During this session, this clinician used the following therapeutic modalities: Client-centered Therapy, Cognitive Behavioral Therapy and Supportive Psychotherapy    Substance Abuse was not addressed during this session  If the client is diagnosed with a co-occurring substance use disorder, please indicate any changes in the frequency or amount of use: n/a  Stage of change for addressing substance use diagnoses: No substance use/Not applicable    ASSESSMENT:  Marrian Koyanagi presents with a Euthymic/ normal and Anxious mood  her affect is Normal range and intensity and Tearful, which is congruent, with her mood and the content of the session   The client has made "progress on their goals  Robert has been struggling with things that are in her control and has been focusing a lot on the future with this baby coming  She doesn't always have the support of others and is often having to figure out a plan for her own  Roman Lu presents with a none risk of suicide, none risk of self-harm, and none risk of harm to others  For any risk assessment that surpasses a \"low\" rating, a safety plan must be developed  A safety plan was indicated: no  If yes, describe in detail n/a    PLAN: Between sessions, Roman Lu will continue challenging her perception of things and set boundaries with her family  At the next session, the therapist will use Client-centered Therapy, Cognitive Behavioral Therapy and Supportive Psychotherapy to address her anxiety and depression  Behavioral Health Treatment Plan and Discharge Planning: Roman Lu is aware of and agrees to continue to work on their treatment plan  They have identified and are working toward their discharge goals   yes    Visit start and stop times:    06/01/23  Start Time: 1605  Stop Time: 4604  Total Visit Time: 50 minutes  "

## 2023-06-01 NOTE — BH TREATMENT PLAN
Outpatient [address]  1995     Date of Initial Psychotherapy Assessment: 6/29/2022  Date of Current Treatment Plan: 06/01/23  Treatment Plan Target Date: 11/28/2023  Treatment Plan Expiration Date: 11/28/2023    Diagnosis:   1  Depression with anxiety            Area(s) of Need: coping skills to manage her symptoms, process through her past, and set boundaries     Long Term Goal 1 (in the client's own words): Robert will process through her past and move forward    Progress: Robert has been able to work through some of her past struggles and would like to continue working on this  Stage of Change: Action    Target Date for completion: TBD     Anticipated therapeutic modalities: Solution-Focused Therapy, Mindfulness-Based Therapy, Client-Centered Therapy, Cognitive Behavioral Therapy, Supportive Therapy     People identified to complete this goal: Robert and Therapist       Objective 1: (identify the means of measuring success in meeting the objective): Robert will identify her feelings regarding her past      Objective 2: (identify the means of measuring success in meeting the objective): Robert will develop cognitive restructuring skills in order to move on from her past      Long Term Goal 2 (in the client's own words): Robert will improve herself and how she acts towards others    Progress: Robert has been able to improve her confidence and learn coping skills to manage her anxiety  She has been able to assert herself in many situations       Stage of Change: Action    Target Date for completion: TBD     Anticipated therapeutic modalities: Robert will develop cognitive restructuring skills in order to move on from her past     People identified to complete this goal: Robert and Therapist       Objective 1: (identify the means of measuring success in meeting the objective): Robert will identify how she would like to treat "others      Objective 2: (identify the means of measuring success in meeting the objective): [facility] will work on empowering herself       I am currently under the care of a [facility] psychiatric provider: yes    My [facility]    I am currently taking psychiatric medications: Yes, as prescribed by PCP    I feel that I will be ready for discharge from mental health care when I reach the following (measurable goal/objective): \"Never\"     For children and adults who have a legal guardian:   Has there been any change to custody orders and/or guardianship status? NA  If yes, attach updated documentation  I have created my Crisis Plan and have been offered a copy of this plan    [address]: Diagnosis and Treatment Plan explained to Caterina acknowledges an understanding of their diagnosis  Luis Alfredo Simental agrees to this treatment plan      I have been offered a copy of this Treatment Plan  yes        "

## 2023-06-14 ENCOUNTER — ROUTINE PRENATAL (OUTPATIENT)
Dept: OBGYN CLINIC | Facility: CLINIC | Age: 28
End: 2023-06-14
Payer: COMMERCIAL

## 2023-06-14 VITALS — BODY MASS INDEX: 30.42 KG/M2 | SYSTOLIC BLOOD PRESSURE: 112 MMHG | DIASTOLIC BLOOD PRESSURE: 78 MMHG | WEIGHT: 182.8 LBS

## 2023-06-14 DIAGNOSIS — Z34.02 PRENATAL CARE, FIRST PREGNANCY, SECOND TRIMESTER: ICD-10-CM

## 2023-06-14 DIAGNOSIS — Z36.9 ENCOUNTER FOR ANTENATAL SCREENING: ICD-10-CM

## 2023-06-14 DIAGNOSIS — Z33.1 INCIDENTAL PREGNANCY: ICD-10-CM

## 2023-06-14 DIAGNOSIS — Z3A.16 16 WEEKS GESTATION OF PREGNANCY: Primary | ICD-10-CM

## 2023-06-14 LAB
SL AMB  POCT GLUCOSE, UA: NORMAL
SL AMB POCT URINE PROTEIN: NORMAL

## 2023-06-14 PROCEDURE — 81002 URINALYSIS NONAUTO W/O SCOPE: CPT | Performed by: PHYSICIAN ASSISTANT

## 2023-06-14 PROCEDURE — PNV: Performed by: PHYSICIAN ASSISTANT

## 2023-06-14 NOTE — PROGRESS NOTES
Patient here for prenatal visit  She denies any complaints  She is not feeling movement yet  AFP screen order given  US scheduled 7/12/23

## 2023-06-14 NOTE — ASSESSMENT & PLAN NOTE
[facility]  is a 34 y o  Ginny Moulton @16w0d who presents for routine prenatal visit  Denies OB complaints  NIPT - low risk   AFP - order given today with instructions  NT scan completed 5/24/23  Level II scheduled 7/12/23   Plans to breast feed  Discussed ordering of breast pump @ 28 week visit  Has not yet appreciated FM  Denies contractions, cramping, leakage of fluid or vaginal bleeding  Reviewed PTL precautions and reasons to call

## 2023-06-14 NOTE — PROGRESS NOTES
Problem List Items Addressed This Visit        Other    16 weeks gestation of pregnancy - Primary     [facility]  is a 34 y o  Gab Hope @16w0d who presents for routine prenatal visit  Denies OB complaints  NIPT - low risk   AFP - order given today with instructions  NT scan completed 23  Level II scheduled 23   Plans to breast feed  Discussed ordering of breast pump @ 28 week visit  Has not yet appreciated FM  Denies contractions, cramping, leakage of fluid or vaginal bleeding  Reviewed PTL precautions and reasons to call            Other Visit Diagnoses     Incidental pregnancy        Relevant Orders    Alpha fetoprotein, maternal    Encounter for  screening        Relevant Orders    Alpha fetoprotein, maternal    Prenatal care, first pregnancy, second trimester        Relevant Orders    POCT urine dip (Completed)

## 2023-06-20 ENCOUNTER — TELEMEDICINE (OUTPATIENT)
Dept: BEHAVIORAL/MENTAL HEALTH CLINIC | Facility: CLINIC | Age: 28
End: 2023-06-20
Payer: COMMERCIAL

## 2023-06-20 DIAGNOSIS — F41.8 DEPRESSION WITH ANXIETY: Primary | ICD-10-CM

## 2023-06-20 PROCEDURE — 90834 PSYTX W PT 45 MINUTES: CPT | Performed by: COUNSELOR

## 2023-06-20 NOTE — PSYCH
Virtual Regular Visit    Verification of patient location:    Patient is located at Home in the following state in which I hold an active license [place]      Assessment/Plan:    Problem List Items Addressed This Visit        Other    Depression with anxiety - Primary       Goals addressed in session: Goal 1          Reason for visit is   Chief Complaint   Patient presents with   • Anxiety   • Depression        Encounter provider Cristino Baumann    Provider located at [address] [place] [address] [id number] [phone]      Recent Visits  No visits were found meeting these conditions  Showing recent visits within past 7 days and meeting all other requirements  Today's Visits  Date Type Provider Dept   06/20/23 Telemedicine [facility]   Showing today's visits and meeting all other requirements  Future Appointments  No visits were found meeting these conditions  Showing future appointments within next 150 days and meeting all other requirements       The patient was identified by name and date of birth  Jackeline Kendall was informed that this is a telemedicine visit and that the [facility]  She agrees to proceed     My office door was closed  No one else was in the room  She acknowledged consent and understanding of privacy and security of the video platform  The patient has agreed to participate and understands they can discontinue the visit at any time  Patient is aware this is a billable service           HPI     Past Medical History:   Diagnosis Date   • Allergic rhinitis due to pollen     LAST ASSESSED [id number]   • Anxiety    • Depression     Has not taken medication for 6 months   • Dysmenorrhea     LAST ASSESSED [id number]   • Globus sensation     LAST ASSESSED [id number]   • Wrist sprain     LAST ASSESSED [id number]       Past Surgical History:   Procedure Laterality Date   • NO PAST SURGERIES         Current Outpatient Medications   Medication Sig Dispense Refill   • aspirin (ECOTRIN LOW STRENGTH) 81 mg EC tablet Take 2 tablets (162 mg total) by mouth daily 60 tablet 5   • ondansetron (ZOFRAN-ODT) 4 mg disintegrating tablet Take 1 tablet (4 mg total) by mouth every 6 (six) hours as needed for nausea or vomiting 20 tablet 0   • Prenatal MV-Min-Fe Fum-FA-DHA (PRENATAL+DHA PO) Take by mouth       No current facility-administered medications for this visit  Allergies   Allergen Reactions   • Fruit C [Ascorbate - Food Allergy] GI Intolerance     Fresh Fruit- stomach pains    • Pollen Extract Allergic Rhinitis       Review of Systems    Video Exam    There were no vitals filed for this visit  Physical Exam     Behavioral Health Psychotherapy Progress Note    Psychotherapy Provided: Individual Psychotherapy     1  Depression with anxiety            Goals addressed in session: Goal 1     DATA: Robert expressed an increase in her anxiety over the past two weeks  She stated that she's been trying to work through her thoughts and challenging then, but no matter how much she tells herself that she can't control certain situations she continues to think about it  Robert shared that her doctor changed her medication and doesn't feel it's been helpful  This provider encouraged her to reach out in order to get her medication changed in order to decrease the anxiety to utilize the skills being taught  Robert reported that she isn't getting along with her   They still haven't found a  for their child  She expressed that they have too high of standards, but the prices for those are beyond their price range  She isn't sure if she wants to settle on less when there are red flags  She doesn't feel that her  is taking this seriously   This provider encouraged her to share her feelings with her  and ways they can come "to an agreement  Robert has been feeling that she wants to make her  happy, but feel that she can't  He wants to take a new job, but it would be a huge paycut  She stated that he recognizes this, but also isn't coming to terms and is going through the process to get this job  This provider encouraged Robert to improve communication with her  utilizing \"I\" statements  During this session, this clinician used the following therapeutic modalities: Client-centered Therapy, Cognitive Behavioral Therapy and Supportive Psychotherapy    Substance Abuse was not addressed during this session  If the client is diagnosed with a co-occurring substance use disorder, please indicate any changes in the frequency or amount of use: n/a  Stage of change for addressing substance use diagnoses: No substance use/Not applicable    ASSESSMENT:  Michele Erickson presents with a Depressed mood  her affect is Tearful, which is congruent, with her mood and the content of the session  The client has not made progress on their goals  Robert seems to be struggling with her anxiety and medication change  It doesn't seem to help with all of this combined with being pregnant  She isn't feeling supported by her  or boss, which is causing her to have increased in negative thoughts  Michele Erickson presents with a none risk of suicide, none risk of self-harm, and none risk of harm to others  For any risk assessment that surpasses a \"low\" rating, a safety plan must be developed  A safety plan was indicated: no  If yes, describe in detail n/a    PLAN: Between sessions, Michele Erickson will continue challenging her thoughts and talk with her   At the next session, the therapist will use Client-centered Therapy, Cognitive Behavioral Therapy and Supportive Psychotherapy to address her anxiety and depression      Behavioral Health Treatment Plan and Discharge Planning: Michele Erickson is aware of and agrees to " continue to work on their treatment plan  They have identified and are working toward their discharge goals   yes    Visit start and stop times:    06/20/23  Start Time: 1302  Stop Time: 1352  Total Visit Time: 50 minutes

## 2023-06-27 ENCOUNTER — APPOINTMENT (OUTPATIENT)
Dept: LAB | Age: 28
End: 2023-06-27
Payer: COMMERCIAL

## 2023-06-27 DIAGNOSIS — Z36.9 ENCOUNTER FOR ANTENATAL SCREENING: ICD-10-CM

## 2023-06-27 DIAGNOSIS — Z33.1 INCIDENTAL PREGNANCY: ICD-10-CM

## 2023-06-27 PROCEDURE — 36415 COLL VENOUS BLD VENIPUNCTURE: CPT

## 2023-06-27 PROCEDURE — 82105 ALPHA-FETOPROTEIN SERUM: CPT

## 2023-06-30 LAB
2ND TRIMESTER 4 SCREEN SERPL-IMP: NORMAL
AFP ADJ MOM SERPL: 1.22
AFP INTERP AMN-IMP: NORMAL
AFP INTERP SERPL-IMP: NORMAL
AFP INTERP SERPL-IMP: NORMAL
AFP SERPL-MCNC: 47.7 NG/ML
AGE AT DELIVERY: 26.5 YR
GA METHOD: NORMAL
GA: 17.9 WEEKS
IDDM PATIENT QL: NO
MULTIPLE PREGNANCY: NO
NEURAL TUBE DEFECT RISK FETUS: 6110 %

## 2023-07-12 ENCOUNTER — ROUTINE PRENATAL (OUTPATIENT)
Facility: HOSPITAL | Age: 28
End: 2023-07-12
Payer: COMMERCIAL

## 2023-07-12 VITALS
WEIGHT: 187.2 LBS | SYSTOLIC BLOOD PRESSURE: 108 MMHG | DIASTOLIC BLOOD PRESSURE: 64 MMHG | HEIGHT: 65 IN | HEART RATE: 98 BPM | BODY MASS INDEX: 31.19 KG/M2

## 2023-07-12 DIAGNOSIS — O44.40 LOW-LYING PLACENTA: ICD-10-CM

## 2023-07-12 DIAGNOSIS — Z3A.20 20 WEEKS GESTATION OF PREGNANCY: ICD-10-CM

## 2023-07-12 DIAGNOSIS — Z36.3 ENCOUNTER FOR ANTENATAL SCREENING FOR MALFORMATION: ICD-10-CM

## 2023-07-12 DIAGNOSIS — Z36.86 ENCOUNTER FOR ANTENATAL SCREENING FOR CERVICAL LENGTH: Primary | ICD-10-CM

## 2023-07-12 PROCEDURE — 76805 OB US >/= 14 WKS SNGL FETUS: CPT | Performed by: STUDENT IN AN ORGANIZED HEALTH CARE EDUCATION/TRAINING PROGRAM

## 2023-07-12 PROCEDURE — 99213 OFFICE O/P EST LOW 20 MIN: CPT | Performed by: STUDENT IN AN ORGANIZED HEALTH CARE EDUCATION/TRAINING PROGRAM

## 2023-07-12 PROCEDURE — 76817 TRANSVAGINAL US OBSTETRIC: CPT | Performed by: STUDENT IN AN ORGANIZED HEALTH CARE EDUCATION/TRAINING PROGRAM

## 2023-07-12 NOTE — PROGRESS NOTES
Ultrasound Probe Disinfection    A transvaginal ultrasound was performed. Prior to use, disinfection was performed with High Level Disinfection Process ([employer]). Probe serial number [id number]: [id number] was used.       Octaviano Zuleta  07/12/23  12:58 PM

## 2023-07-12 NOTE — PROGRESS NOTES
[address] [facility] [address]: Ms. Gaurang Ha was seen today for anatomic survey and cervical length screening ultrasound. See ultrasound report under "OB Procedures" tab. MDM:   I. Diagnoses/Problems addressed:  low lying placenta  II. Data: I reviewed 2 lab tests ordered by another provider. III. Risk of morbidity: Low    Please don't hesitate to contact our office with any concerns or questions.   -Thong Catalan MD

## 2023-07-18 ENCOUNTER — TELEMEDICINE (OUTPATIENT)
Dept: BEHAVIORAL/MENTAL HEALTH CLINIC | Facility: CLINIC | Age: 28
End: 2023-07-18
Payer: COMMERCIAL

## 2023-07-18 DIAGNOSIS — F32.0 MILD MAJOR DEPRESSION (HCC): Primary | ICD-10-CM

## 2023-07-18 DIAGNOSIS — F41.9 ANXIETY: ICD-10-CM

## 2023-07-18 PROCEDURE — 90834 PSYTX W PT 45 MINUTES: CPT | Performed by: COUNSELOR

## 2023-07-18 NOTE — PSYCH
Virtual Regular Visit    Verification of patient location:    Patient is located at Home in the following state in which I hold an active license [place]      Assessment/Plan:    Problem List Items Addressed This Visit        Other    Anxiety    Mild major depression ([address]) - Primary       Goals addressed in session: Goal 1          Reason for visit is No chief complaint on file. Encounter provider Scotty Moore    Provider located at [address] [place] [address] [id number] [phone]      Recent Visits  No visits were found meeting these conditions. Showing recent visits within past 7 days and meeting all other requirements  Today's Visits  Date Type Provider Dept   07/18/23 Telemedicine [address]   Showing today's visits and meeting all other requirements  Future Appointments  No visits were found meeting these conditions. Showing future appointments within next 150 days and meeting all other requirements       The patient was identified by name and date of birth. Nicholas Hernandez was informed that this is a telemedicine visit and that [facility]. She agrees to proceed. .  My office door was closed. No one else was in the room. She acknowledged consent and understanding of privacy and security of the video platform. The patient has agreed to participate and understands they can discontinue the visit at any time. Patient is aware this is a billable service.          HPI     Past Medical History:   Diagnosis Date   • Allergic rhinitis due to pollen     LAST ASSESSED [id number]   • Anxiety    • Depression     Has not taken medication for 6 months   • Dysmenorrhea     LAST ASSESSED [id number]   • Globus sensation     LAST ASSESSED [id number]   • Wrist sprain     LAST ASSESSED [id number]       Past Surgical History:   Procedure Laterality Date   • NO PAST SURGERIES         Current Outpatient Medications   Medication Sig Dispense Refill   • aspirin (ECOTRIN LOW STRENGTH) 81 mg EC tablet Take 2 tablets (162 mg total) by mouth daily 60 tablet 5   • ondansetron (ZOFRAN-ODT) 4 mg disintegrating tablet Take 1 tablet (4 mg total) by mouth every 6 (six) hours as needed for nausea or vomiting 20 tablet 0   • Prenatal MV-Min-Fe Fum-FA-DHA (PRENATAL+DHA PO) Take by mouth       No current facility-administered medications for this visit. Allergies   Allergen Reactions   • Fruit C [Ascorbate - Food Allergy] GI Intolerance     Fresh Fruit- stomach pains    • Pollen Extract Allergic Rhinitis       Review of Systems    Video Exam    There were no vitals filed for this visit. Physical Exam     Behavioral Health Psychotherapy Progress Note    Psychotherapy Provided: Individual Psychotherapy     1. Mild major depression ([address])        2. Anxiety            Goals addressed in session: Goal 1     DATA: Chintan arrived for her individual virtual session. She identified that she had to miss her session last time due to her dog getting sick. She recognizes that she only has a little bit of time with her dog and wants to spend as much time with them. She reported that she has missed out on camping this past week and doesn't want to attend their yearly family vacation in September as she wants to be able to be with her dog. Chintan reported that she's still having the same argument with her  about him taking this new job that would be opposite hours of her and less pay. She feels that he often says what she wants to hear as he will tell her that he wants to at least try and get it, but will turn it down. He has expressed to her that this is his dream job. We processed through her reservations about wanting to give him exactly what he wants as she feels he will leave her. She reported that he has threatened this before.  This provider validated her feelings and encouraged her to express herself to her . During this session, this clinician used the following therapeutic modalities: Client-centered Therapy, Cognitive Behavioral Therapy and Supportive Psychotherapy    Substance Abuse was not addressed during this session. If the client is diagnosed with a co-occurring substance use disorder, please indicate any changes in the frequency or amount of use: n/a. Stage of change for addressing substance use diagnoses: No substance use/Not applicable    ASSESSMENT:  Afshan Villegas presents with a Euthymic/ normal and Depressed mood. her affect is Normal range and intensity and Tearful, which is congruent, with her mood and the content of the session. The client has made progress on their goals. Afshan Villegas presents with a none risk of suicide, none risk of self-harm, and none risk of harm to others. For any risk assessment that surpasses a "low" rating, a safety plan must be developed. A safety plan was indicated: no  If yes, describe in detail n/a    PLAN: Between sessions, Afshan Villegas will express herself to her  and advocate for herself. At the next session, the therapist will use Client-centered Therapy, Cognitive Behavioral Therapy and Supportive Psychotherapy to address her depression. Behavioral Health Treatment Plan and Discharge Planning: Afshan Villegas is aware of and agrees to continue to work on their treatment plan. They have identified and are working toward their discharge goals.  yes    Visit start and stop times:    07/18/23  Start Time: 1100  Stop Time: 1149  Total Visit Time: 49 minutes

## 2023-08-09 ENCOUNTER — ROUTINE PRENATAL (OUTPATIENT)
Dept: OBGYN CLINIC | Facility: CLINIC | Age: 28
End: 2023-08-09
Payer: COMMERCIAL

## 2023-08-09 VITALS
WEIGHT: 196 LBS | HEIGHT: 65 IN | DIASTOLIC BLOOD PRESSURE: 64 MMHG | SYSTOLIC BLOOD PRESSURE: 100 MMHG | BODY MASS INDEX: 32.65 KG/M2

## 2023-08-09 DIAGNOSIS — Z34.02 ENCOUNTER FOR SUPERVISION OF NORMAL FIRST PREGNANCY IN SECOND TRIMESTER: Primary | ICD-10-CM

## 2023-08-09 DIAGNOSIS — Z3A.24 24 WEEKS GESTATION OF PREGNANCY: ICD-10-CM

## 2023-08-09 LAB
SL AMB  POCT GLUCOSE, UA: NEGATIVE
SL AMB POCT URINE PROTEIN: NEGATIVE

## 2023-08-09 PROCEDURE — 81002 URINALYSIS NONAUTO W/O SCOPE: CPT | Performed by: STUDENT IN AN ORGANIZED HEALTH CARE EDUCATION/TRAINING PROGRAM

## 2023-08-09 PROCEDURE — PNV: Performed by: STUDENT IN AN ORGANIZED HEALTH CARE EDUCATION/TRAINING PROGRAM

## 2023-08-09 NOTE — ASSESSMENT & PLAN NOTE
-precautions reviewed  -28wk labs ordered/discussed  -prepregnancy BMI 29 with goal weight gain 15-25#: TWG = 17

## 2023-08-09 NOTE — PROGRESS NOTES
Pt is here for routine ob visit   No concerns at this time  Urine neg/neg   No LOF,VB,Contractions  +FM   28wk labs ordered

## 2023-08-09 NOTE — PROGRESS NOTES
34 y.o.  at 24w0d, here for routine OB visit. Feeling well overall and without concerns. Good FM. Denies LOF, VB, contractions. Denies dysuria, hematuria.      Problem List Items Addressed This Visit        Other    24 weeks gestation of pregnancy     -precautions reviewed  -28wk labs ordered/discussed  -prepregnancy BMI 29 with goal weight gain 15-25#: TWG = 17          Other Visit Diagnoses     Encounter for supervision of normal first pregnancy in second trimester    -  Primary    Relevant Orders    POCT urine dip (Completed)    Anemia Panel w/Reflex, OB    CBC and differential    Glucose, 1H PG    RPR-Syphilis Screening (Total Syphilis IGG/IGM)

## 2023-08-14 DIAGNOSIS — F41.8 DEPRESSION WITH ANXIETY: Primary | ICD-10-CM

## 2023-08-15 ENCOUNTER — DOCUMENTATION (OUTPATIENT)
Dept: PERINATAL CARE | Facility: OTHER | Age: 28
End: 2023-08-15

## 2023-08-15 NOTE — PROGRESS NOTES
Lennie reached out to [facility] requesting clinical records for claim r/t NIPT testing. Clinical faxed to [facility] at #[phone].

## 2023-08-16 ENCOUNTER — TELEPHONE (OUTPATIENT)
Dept: PSYCHIATRY | Facility: CLINIC | Age: 28
End: 2023-08-16

## 2023-08-16 NOTE — TELEPHONE ENCOUNTER
Patient is calling regarding cancelling an appointment.     Date/Time: 8/16/2023 at 1 pm    Reason: cant make it    Patient was rescheduled: YES [] NO [x]  If yes, when was Patient reschedule for: n/a      Patient requesting call back to reschedule: YES [] NO [x]

## 2023-08-31 ENCOUNTER — APPOINTMENT (OUTPATIENT)
Dept: LAB | Age: 28
End: 2023-08-31
Payer: COMMERCIAL

## 2023-08-31 DIAGNOSIS — Z34.02 ENCOUNTER FOR SUPERVISION OF NORMAL FIRST PREGNANCY IN SECOND TRIMESTER: ICD-10-CM

## 2023-08-31 LAB
BASOPHILS # BLD AUTO: 0.04 THOUSANDS/ÂΜL (ref 0–0.1)
BASOPHILS NFR BLD AUTO: 0 % (ref 0–1)
EOSINOPHIL # BLD AUTO: 0.15 THOUSAND/ÂΜL (ref 0–0.61)
EOSINOPHIL NFR BLD AUTO: 1 % (ref 0–6)
ERYTHROCYTE [DISTWIDTH] IN BLOOD BY AUTOMATED COUNT: 12.6 % (ref 11.6–15.1)
GLUCOSE 1H P 50 G GLC PO SERPL-MCNC: 122 MG/DL (ref 40–134)
HCT VFR BLD AUTO: 35.3 % (ref 34.8–46.1)
HGB BLD-MCNC: 12 G/DL (ref 11.5–15.4)
IMM GRANULOCYTES # BLD AUTO: 0.16 THOUSAND/UL (ref 0–0.2)
IMM GRANULOCYTES NFR BLD AUTO: 2 % (ref 0–2)
LYMPHOCYTES # BLD AUTO: 2.35 THOUSANDS/ÂΜL (ref 0.6–4.47)
LYMPHOCYTES NFR BLD AUTO: 21 % (ref 14–44)
MCH RBC QN AUTO: 31.3 PG (ref 26.8–34.3)
MCHC RBC AUTO-ENTMCNC: 34 G/DL (ref 31.4–37.4)
MCV RBC AUTO: 92 FL (ref 82–98)
MONOCYTES # BLD AUTO: 0.67 THOUSAND/ÂΜL (ref 0.17–1.22)
MONOCYTES NFR BLD AUTO: 6 % (ref 4–12)
NEUTROPHILS # BLD AUTO: 7.63 THOUSANDS/ÂΜL (ref 1.85–7.62)
NEUTS SEG NFR BLD AUTO: 70 % (ref 43–75)
NRBC BLD AUTO-RTO: 0 /100 WBCS
PLATELET # BLD AUTO: 275 THOUSANDS/UL (ref 149–390)
PMV BLD AUTO: 9.8 FL (ref 8.9–12.7)
RBC # BLD AUTO: 3.84 MILLION/UL (ref 3.81–5.12)
TREPONEMA PALLIDUM IGG+IGM AB [PRESENCE] IN SERUM OR PLASMA BY IMMUNOASSAY: NORMAL
WBC # BLD AUTO: 11 THOUSAND/UL (ref 4.31–10.16)

## 2023-08-31 PROCEDURE — 86780 TREPONEMA PALLIDUM: CPT

## 2023-08-31 PROCEDURE — 36415 COLL VENOUS BLD VENIPUNCTURE: CPT

## 2023-08-31 PROCEDURE — 85025 COMPLETE CBC W/AUTO DIFF WBC: CPT

## 2023-08-31 PROCEDURE — 82950 GLUCOSE TEST: CPT

## 2023-09-07 ENCOUNTER — SOCIAL WORK (OUTPATIENT)
Dept: BEHAVIORAL/MENTAL HEALTH CLINIC | Facility: CLINIC | Age: 28
End: 2023-09-07
Payer: COMMERCIAL

## 2023-09-07 DIAGNOSIS — F41.9 ANXIETY: Primary | ICD-10-CM

## 2023-09-07 DIAGNOSIS — F32.0 MILD MAJOR DEPRESSION (HCC): ICD-10-CM

## 2023-09-07 PROCEDURE — 90834 PSYTX W PT 45 MINUTES: CPT | Performed by: COUNSELOR

## 2023-09-07 NOTE — PSYCH
Behavioral Health Psychotherapy Progress Note    Psychotherapy Provided: Individual Psychotherapy     1. Anxiety        2. Mild major depression ([address])            Goals addressed in session: Goal 1     DATA: Chintan arrived for her individual session today. She expressed that things have siobhan stressful lately as she had to put her dog down last week and that has caused her to think of death and not understanding what happens after her dog has passed. She is feeling guilty not being there anymore for him and how much she misses him. She identified that her  is still in the process of applying for the fire fighting job and that has been a huge stress to her. She explained that financially this will be difficult for them in combination of not wanting this type of life now that they are starting a family. This provider validated her emotions and helped her recognize what evidence she does have to support her thoughts and how she isn't able to point out how things will go. Chintan continued to identify that she can only see how things will get worst once she gets this job and the baby is here. She spoke briefly about her job and how she wants to find a new one, but that they can't take two pay cuts at one time. During this session, this clinician used the following therapeutic modalities: Cognitive Behavioral Therapy, Cognitive Processing Therapy and Supportive Psychotherapy    Substance Abuse was not addressed during this session. If the client is diagnosed with a co-occurring substance use disorder, please indicate any changes in the frequency or amount of use: n/a. Stage of change for addressing substance use diagnoses: No substance use/Not applicable    ASSESSMENT:  Edelmira Fabian presents with a Euthymic/ normal and Depressed mood. her affect is Normal range and intensity and Tearful, which is congruent, with her mood and the content of the session. The client has not made progress on their goals. Celestina Villanueva presents with a none risk of suicide, none risk of self-harm, and none risk of harm to others. For any risk assessment that surpasses a "low" rating, a safety plan must be developed. A safety plan was indicated: no  If yes, describe in detail n/a    PLAN: Between sessions, Celestina Villanueva will continue focusing on what she can control and utilizing her coping skills. At the next session, the therapist will use Client-centered Therapy, Cognitive Behavioral Therapy and Supportive Psychotherapy to address her anxiety and depression. Behavioral Health Treatment Plan and Discharge Planning: Celestina Villanueva is aware of and agrees to continue to work on their treatment plan. They have identified and are working toward their discharge goals.  yes    Visit start and stop times:    09/07/23

## 2023-09-14 ENCOUNTER — ROUTINE PRENATAL (OUTPATIENT)
Dept: OBGYN CLINIC | Facility: CLINIC | Age: 28
End: 2023-09-14
Payer: COMMERCIAL

## 2023-09-14 ENCOUNTER — OFFICE VISIT (OUTPATIENT)
Dept: FAMILY MEDICINE CLINIC | Facility: CLINIC | Age: 28
End: 2023-09-14
Payer: COMMERCIAL

## 2023-09-14 VITALS
TEMPERATURE: 98.2 F | SYSTOLIC BLOOD PRESSURE: 122 MMHG | WEIGHT: 202.8 LBS | HEIGHT: 65 IN | BODY MASS INDEX: 33.79 KG/M2 | RESPIRATION RATE: 14 BRPM | OXYGEN SATURATION: 99 % | HEART RATE: 81 BPM | DIASTOLIC BLOOD PRESSURE: 84 MMHG

## 2023-09-14 VITALS — WEIGHT: 203.4 LBS | SYSTOLIC BLOOD PRESSURE: 120 MMHG | BODY MASS INDEX: 33.85 KG/M2 | DIASTOLIC BLOOD PRESSURE: 78 MMHG

## 2023-09-14 DIAGNOSIS — Z34.93 PRENATAL CARE IN THIRD TRIMESTER: Primary | ICD-10-CM

## 2023-09-14 DIAGNOSIS — F32.0 MILD MAJOR DEPRESSION (HCC): ICD-10-CM

## 2023-09-14 DIAGNOSIS — Z3A.28 28 WEEKS GESTATION OF PREGNANCY: ICD-10-CM

## 2023-09-14 DIAGNOSIS — Z23 NEED FOR TDAP VACCINATION: ICD-10-CM

## 2023-09-14 DIAGNOSIS — O44.40 LOW-LYING PLACENTA: ICD-10-CM

## 2023-09-14 DIAGNOSIS — F41.8 DEPRESSION WITH ANXIETY: ICD-10-CM

## 2023-09-14 LAB
SL AMB  POCT GLUCOSE, UA: NEGATIVE
SL AMB POCT URINE PROTEIN: NEGATIVE

## 2023-09-14 PROCEDURE — 99213 OFFICE O/P EST LOW 20 MIN: CPT | Performed by: FAMILY MEDICINE

## 2023-09-14 PROCEDURE — 81002 URINALYSIS NONAUTO W/O SCOPE: CPT | Performed by: STUDENT IN AN ORGANIZED HEALTH CARE EDUCATION/TRAINING PROGRAM

## 2023-09-14 PROCEDURE — PNV: Performed by: STUDENT IN AN ORGANIZED HEALTH CARE EDUCATION/TRAINING PROGRAM

## 2023-09-14 RX ORDER — SERTRALINE HYDROCHLORIDE 100 MG/1
50 TABLET, FILM COATED ORAL DAILY
Qty: 90 TABLET | Refills: 0 | Status: SHIPPED | OUTPATIENT
Start: 2023-09-14 | End: 2023-09-14 | Stop reason: CLARIF

## 2023-09-14 RX ORDER — SERTRALINE HYDROCHLORIDE 100 MG/1
100 TABLET, FILM COATED ORAL DAILY
Qty: 90 TABLET | Refills: 0 | Status: SHIPPED | OUTPATIENT
Start: 2023-09-14

## 2023-09-14 NOTE — ASSESSMENT & PLAN NOTE
Seeing PCP after me today. Plans to discuss increase in zoloft.    Continues to see therapist  No SI or HI

## 2023-09-14 NOTE — PROGRESS NOTES
Patient here for prenatal visit. GA: 29w1d    Denies leaking of fluid, bleeding, cramping  Patient complaining of pelvic pressure. 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 Normal Fetal Movement  Urine is negative  28wk labs completed  Yellow Folder given/reviewed. Breast Pump ordered today  Peds Referral given today  Tdap next visit  Delivery consent signed today.

## 2023-09-14 NOTE — PROGRESS NOTES
Name: Sudha Hatch      : 1995      MRN: [id number]  Encounter Provider: To Swanson DO  Encounter Date: 2023   Encounter department: [facility]     1. Depression with anxiety  Assessment & Plan:  - Recently started on Zoloft 50 mg and taking as prescribed. Denies any side effects. She does notice positive improvement. However, she feels like could be slightly better. No SI or HI.  -Currently 29 weeks 2 days pregnant. Following with OB. .  -Discussed increasing dose rather than adding new medications for continued improvement. Patient agrees and notes she discussed with OB who was in agreement as well. Plan:  -Increase Zoloft 100 mg p.o. daily  -Continue behavioral therapy  -Discussed meditation, mindfulness, box breathing and situations of anxiety.  -Follow-up in 6 to 8 weeks or sooner with any changes in signs and symptoms. Orders:  -     sertraline (ZOLOFT) 100 mg tablet; Take 1 tablet (100 mg total) by mouth daily         Marcelo Woodson is a 80-year-old  at 29 weeks pregnancy who presents today for follow-up of her depression and anxiety. She was started on Zoloft 50 mg for her symptoms about 6 to 8 weeks ago and notes she has been taking as prescribed. She notes since starting that she is feeling better overall. She denies any dramatic highs or lows. She is sleeping slightly better as well. Admits she has been talking to therapist with good results as well. She did meet with OB today and discussed possibility of adding another medication or increasing current dosing. After further discussion we have decided to increase Zoloft to 100 mg p.o. daily to continue to improve her depressive symptoms. She denies any SI or HI. Review of Systems   Constitutional: Negative for chills and fever. HENT: Negative for ear pain and sore throat. Eyes: Negative for pain and visual disturbance.    Respiratory: Negative for cough and shortness of breath. Cardiovascular: Negative for chest pain and palpitations. Gastrointestinal: Negative for abdominal pain and vomiting. Genitourinary: Negative for dysuria and hematuria. Musculoskeletal: Negative for arthralgias and back pain. Skin: Negative for color change and rash. Neurological: Negative for seizures and syncope. Psychiatric/Behavioral: Positive for dysphoric mood. Negative for sleep disturbance and suicidal ideas. The patient is nervous/anxious. All other systems reviewed and are negative.       Past Medical History:   Diagnosis Date   • Allergic rhinitis due to pollen     LAST ASSESSED [id number]   • Anxiety    • Depression     Has not taken medication for 6 months   • Dysmenorrhea     LAST ASSESSED [id number]   • Globus sensation     LAST ASSESSED [id number]   • Wrist sprain     LAST ASSESSED [id number]     Past Surgical History:   Procedure Laterality Date   • NO PAST SURGERIES       Family History   Problem Relation Age of Onset   • Other Mother         IDIOPATHIC THROMBOCYTOPENIC PURPURA   • Alcohol abuse Father    • Anemia Father    • No Known Problems Sister    • No Known Problems Maternal Grandmother    • Esophageal cancer Maternal Grandfather    • Bone cancer Maternal Grandfather    • Cancer Maternal Grandfather    • No Known Problems Paternal Grandmother    • Multiple sclerosis Paternal Grandfather      Social History     Socioeconomic History   • Marital status: /Civil Union     Spouse name: None   • Number of children: None   • Years of education: None   • Highest education level: None   Occupational History   • None   Tobacco Use   • Smoking status: Never   • Smokeless tobacco: Never   Vaping Use   • Vaping Use: Never used   Substance and Sexual Activity   • Alcohol use: Not Currently     Alcohol/week: 1.0 standard drink of alcohol     Types: 1 Glasses of wine per week   • Drug use: No   • Sexual activity: Yes     Partners: Male   Other Topics Concern   • None   Social History Narrative    CAFFEINE USE    DAILY COFFEE     Social Determinants of Health     Financial Resource Strain: Not on file   Food Insecurity: Not on file   Transportation Needs: Not on file   Physical Activity: Not on file   Stress: Not on file   Social Connections: Not on file   Intimate Partner Violence: Not on file   Housing Stability: Not on file     Current Outpatient Medications on File Prior to Visit   Medication Sig   • aspirin (ECOTRIN LOW STRENGTH) 81 mg EC tablet Take 2 tablets (162 mg total) by mouth daily   • Prenatal MV-Min-Fe Fum-FA-DHA (PRENATAL+DHA PO) Take by mouth     Allergies   Allergen Reactions   • Fruit C [Ascorbate - Food Allergy] GI Intolerance     Fresh Fruit- stomach pains    • Pollen Extract Allergic Rhinitis     Immunization History   Administered Date(s) Administered   • DTaP 5 1995, 1995, 1995, 07/16/1998, 03/12/2002   • HPV Quadrivalent 02/10/2014, 04/09/2014, 05/27/2014   • Hep B, adult 1995, 1995, 1997, 03/15/2018   • Hib (PRP-OMP) 1995, 1995, 1997, 07/16/1998   • INFLUENZA 12/27/2017   • IPV 1995, 1995, 06/19/1998, 03/12/2002   • Influenza Quadrivalent Preservative Free 3 years and older IM 12/27/2017   • MMR 06/19/1998, 03/12/2002   • Meningococcal, Unknown Serogroups 09/09/2008   • Tdap 09/09/2008, 12/27/2017   • Varicella 07/16/1998, 09/09/2008       Objective     /84 (BP Location: Left arm, Patient Position: Sitting, Cuff Size: Large)   Pulse 81   Temp 98.2 °F (36.8 °C) (Tympanic)   Resp 14   Ht 5' 5" (1.651 m)   Wt 92 kg (202 lb 12.8 oz)   LMP 02/22/2023 (Exact Date)   SpO2 99%   BMI 33.75 kg/m²     Physical Exam  Vitals and nursing note reviewed. Constitutional:       Appearance: Normal appearance. HENT:      Head: Normocephalic and atraumatic.       Right Ear: External ear normal.      Left Ear: External ear normal.      Nose: Nose normal.      Mouth/Throat:      Mouth: Mucous membranes are moist.   Eyes:      Extraocular Movements: Extraocular movements intact. Conjunctiva/sclera: Conjunctivae normal.   Cardiovascular:      Rate and Rhythm: Normal rate and regular rhythm. Pulses: Normal pulses. Heart sounds: Normal heart sounds. Pulmonary:      Effort: Pulmonary effort is normal.      Breath sounds: Normal breath sounds. Abdominal:      General: Bowel sounds are normal.      Palpations: Abdomen is soft. Musculoskeletal:         General: Normal range of motion. Cervical back: Normal range of motion. Skin:     General: Skin is warm. Capillary Refill: Capillary refill takes less than 2 seconds. Neurological:      General: No focal deficit present. Mental Status: She is alert and oriented to person, place, and time.    Psychiatric:         Mood and Affect: Mood normal.         Behavior: Behavior normal.       Nika Landa DO

## 2023-09-14 NOTE — PROGRESS NOTES
28 weeks gestation of pregnancy  35 yo  at 29+1 here for routine ob visit. No contractions, leaking or bleeding. Good fetal movement. Tdap not available in the office today. Plan next visit. Discussed normal third trimester discomforts. Low-lying placenta  Follow up US     Mild major depression ([address])  Seeing PCP after me today. Plans to discuss increase in zoloft.    Continues to see therapist  No SI or HI

## 2023-09-17 LAB
DME PARACHUTE DELIVERY DATE REQUESTED: NORMAL
DME PARACHUTE ITEM DESCRIPTION: NORMAL
DME PARACHUTE ORDER STATUS: NORMAL
DME PARACHUTE SUPPLIER NAME: NORMAL
DME PARACHUTE SUPPLIER PHONE: NORMAL

## 2023-09-19 LAB
DME PARACHUTE DELIVERY DATE ACTUAL: NORMAL
DME PARACHUTE DELIVERY DATE REQUESTED: NORMAL
DME PARACHUTE ITEM DESCRIPTION: NORMAL
DME PARACHUTE ORDER STATUS: NORMAL
DME PARACHUTE SUPPLIER NAME: NORMAL
DME PARACHUTE SUPPLIER PHONE: NORMAL

## 2023-09-20 ENCOUNTER — ULTRASOUND (OUTPATIENT)
Facility: HOSPITAL | Age: 28
End: 2023-09-20
Payer: COMMERCIAL

## 2023-09-20 ENCOUNTER — TELEPHONE (OUTPATIENT)
Facility: HOSPITAL | Age: 28
End: 2023-09-20

## 2023-09-20 VITALS
HEART RATE: 86 BPM | HEIGHT: 65 IN | SYSTOLIC BLOOD PRESSURE: 112 MMHG | DIASTOLIC BLOOD PRESSURE: 68 MMHG | BODY MASS INDEX: 33.92 KG/M2 | WEIGHT: 203.6 LBS

## 2023-09-20 DIAGNOSIS — Z3A.30 30 WEEKS GESTATION OF PREGNANCY: Primary | ICD-10-CM

## 2023-09-20 PROCEDURE — 76816 OB US FOLLOW-UP PER FETUS: CPT | Performed by: OBSTETRICS & GYNECOLOGY

## 2023-09-20 PROCEDURE — 99214 OFFICE O/P EST MOD 30 MIN: CPT | Performed by: OBSTETRICS & GYNECOLOGY

## 2023-09-20 PROCEDURE — 76817 TRANSVAGINAL US OBSTETRIC: CPT | Performed by: OBSTETRICS & GYNECOLOGY

## 2023-09-20 NOTE — LETTER
September 29, 2023     Asia Flood, [address]    Patient: Daniel Mitchell   YOB: 1995   Date of Visit: 9/20/2023       Dear Ms. Ramyajosue: Thank you for referring Daniel Mitchell to me for evaluation. Below are my notes for this consultation. If you have questions, please do not hesitate to call me. I look forward to following your patient along with you. Sincerely,        Laya Warren MD        CC: No Recipients    Laya Warren MD  9/29/2023 11:36 AM  Sign when Signing Visit  A fetal ultrasound was completed. See Ob procedures in Epic for an interpretation and recommendations. Do not hesitate to contact us in [facility] if you have questions. Brittney Adame MD, [address] [facility]  Maternal Fetal Medicine

## 2023-09-27 NOTE — PROGRESS NOTES
31w0d  Pap 2022. Negative   GC/CT:2023 Neg / Neg   PN1 Labs: 5/10/2023  Blood Type: A Positive :   MFM Level 1:2023  MFM Level 2:2023  AFP: 2023  28 Week Labs:2023   Normal   TDap: Patient will read on the T-Dap vaccine and will let us know if she wants the T-Dap at her next visit . Flu:  GBS:   Blue Folder: given   Yellow Folder: given   Ped Referral : given   Breast pump:  L&D forms:  Delivery consent:     Patient reports lots of fetal movements with no lost of fluids and no contractions at this time .

## 2023-09-28 ENCOUNTER — ROUTINE PRENATAL (OUTPATIENT)
Dept: OBGYN CLINIC | Facility: CLINIC | Age: 28
End: 2023-09-28
Payer: COMMERCIAL

## 2023-09-28 VITALS
SYSTOLIC BLOOD PRESSURE: 118 MMHG | WEIGHT: 205 LBS | HEIGHT: 65 IN | BODY MASS INDEX: 34.16 KG/M2 | DIASTOLIC BLOOD PRESSURE: 76 MMHG

## 2023-09-28 DIAGNOSIS — Z34.03 ENCOUNTER FOR SUPERVISION OF NORMAL FIRST PREGNANCY IN THIRD TRIMESTER: Primary | ICD-10-CM

## 2023-09-28 PROBLEM — Z3A.31 31 WEEKS GESTATION OF PREGNANCY: Status: ACTIVE | Noted: 2023-05-18

## 2023-09-28 LAB
SL AMB  POCT GLUCOSE, UA: NORMAL
SL AMB POCT URINE PROTEIN: NORMAL

## 2023-09-28 PROCEDURE — 81002 URINALYSIS NONAUTO W/O SCOPE: CPT | Performed by: OBSTETRICS & GYNECOLOGY

## 2023-09-28 PROCEDURE — PNV: Performed by: OBSTETRICS & GYNECOLOGY

## 2023-09-28 NOTE — ASSESSMENT & PLAN NOTE
Lebron Schwab is a 34 y.o. [place]c Nahun who presents for routine PNV. Taking ASA & PNV daily   28 week labs reviewed:   TWG 11.8 kg (26 lb)   Mentions left hip pain, advised stretching , frequent ambulation, tylenol, pelvic support belt, offered PT today pt will try home measures firt   Good fetal movement. Denies contractions, cramping, leakage of fluid or vaginal bleeding. Tdap vaccine needs information given   Reviewed  labor precautions and FKCs.    Advised to start Perineal massage at 34-36 weeks   Pregnancy Essential guide and Baby and Me web site recommended

## 2023-09-28 NOTE — PROGRESS NOTES
Problem   31 Weeks Gestation of Pregnancy    First OB labs - normal. Needs to obtain thrombophilia panel   Gc/chlamydia - collected 23  Pap - UTD 3/21/22 TG  Blue folder - given and reviewed    NT scan scheduled 23  Level II scheduled 23        31 weeks gestation of pregnancy  Luís is a 34 y.o. [facility]  31w1d who presents for routine PNV. Taking ASA & PNV daily   28 week labs reviewed:   TWG 11.8 kg (26 lb)   Mentions left hip pain, advised stretching , frequent ambulation, tylenol, pelvic support belt, offered PT today pt will try home measures firt   Good fetal movement. Denies contractions, cramping, leakage of fluid or vaginal bleeding. Tdap vaccine needs information given   Reviewed  labor precautions and FKCs.    Advised to start Perineal massage at 34-36 weeks   Pregnancy Essential guide and Baby and Me web site recommended

## 2023-09-28 NOTE — PATIENT INSTRUCTIONS
Pregnancy at 31 to 34 Weeks   AMBULATORY CARE:   Changes happening with your body: You may continue to have symptoms such as shortness of breath, heartburn, contractions, or swelling of your ankles and feet. You may be gaining about 1 pound a week now. Seek care immediately if:   You develop a severe headache that does not go away. You have new or increased vision changes, such as blurred or spotted vision. You have new or increased swelling in your face or hands. You have vaginal spotting or bleeding. Your water broke or you feel warm water gushing or trickling from your vagina. Call your obstetrician if:   You have more than 5 contractions in 1 hour. You notice any changes in your baby's movements. You have abdominal cramps, pressure, or tightening. You have a change in vaginal discharge. You have chills or a fever. You have vaginal itching, burning, or pain. You have yellow, green, white, or foul-smelling vaginal discharge. You have pain or burning when you urinate, less urine than usual, or pink or bloody urine. You have questions or concerns about your condition or care. How to care for yourself at this stage of your pregnancy:       Eat a variety of healthy foods. Healthy foods include fruits, vegetables, whole-grain breads, low-fat dairy foods, beans, lean meats, and fish. Drink liquids as directed. Ask how much liquid to drink each day and which liquids are best for you. Limit caffeine to less than 200 milligrams each day. Limit your intake of fish to 2 servings each week. Choose fish low in mercury such as canned light tuna, shrimp, salmon, cod, or tilapia. Do not  eat fish high in mercury such as swordfish, tilefish, steven mackerel, and shark. Manage heartburn  by eating 4 or 5 small meals each day instead of large meals. Avoid spicy food. Manage swelling  by lying down and putting your feet up. Take prenatal vitamins as directed.   Your need for certain vitamins and minerals, such as folic acid, increases during pregnancy. Prenatal vitamins provide some of the extra vitamins and minerals you need. Prenatal vitamins may also help to decrease the risk of certain birth defects. Talk to your healthcare provider about exercise. Moderate exercise can help you stay fit. Your healthcare provider will help you plan an exercise program that is safe for you during pregnancy. Do not smoke. Smoking increases your risk of a miscarriage and other health problems during your pregnancy. Smoking can cause your baby to be born too early or weigh less at birth. Ask your healthcare provider for information if you need help quitting. Do not drink alcohol. Alcohol passes from your body to your baby through the placenta. It can affect your baby's brain development and cause fetal alcohol syndrome (FAS). FAS is a group of conditions that causes mental, behavior, and growth problems. Talk to your healthcare provider before you take any medicines. Many medicines may harm your baby if you take them when you are pregnant. Do not take any medicines, vitamins, herbs, or supplements without first talking to your healthcare provider. Never use illegal or street drugs (such as marijuana or cocaine) while you are pregnant. Safety tips during pregnancy:   Avoid hot tubs and saunas. Do not use a hot tub or sauna while you are pregnant, especially during your first trimester. Hot tubs and saunas may raise your baby's temperature and increase the risk of birth defects. Avoid toxoplasmosis. This is an infection caused by eating raw meat or being around infected cat feces. It can cause birth defects, miscarriages, and other problems. Wash your hands after you touch raw meat. Make sure any meat is well-cooked before you eat it. Avoid raw eggs and unpasteurized milk. Use gloves or ask someone else to clean your cat's litter box while you are pregnant. Changes happening with your baby:  By 34 weeks, your baby may weigh more than 5 pounds. Your baby will be about 12 ½ inches long from the top of the head to the rump (baby's bottom). Your baby is gaining about ½ pound a week. Your baby's eyes open and close now. Your baby's kicks and movements are more forceful at this time. What you need to know about prenatal care: Your healthcare provider will check your blood pressure and weight. You may also need the following:  A urine test  may also be done to check for sugar and protein. These can be signs of gestational diabetes or infection. Protein in your urine may also be a sign of preeclampsia. Preeclampsia is a condition that can develop during week 20 or later of your pregnancy. It causes high blood pressure, and it can cause problems with your kidneys and other organs. A gestational diabetes screen  may be done. Your healthcare provider may order either a 1-step or 2-step oral glucose tolerance test (OGTT). 1-step OGTT:  Your blood sugar level will be tested after you have not eaten for 8 hours (fasting). You will then be given a glucose drink. Your level will be tested again 1 hour and 2 hours after you finish the drink. 2-step OGTT:  You do not have to fast for the first part of the test. You will have the glucose drink at any time of day. Your blood sugar level will be checked 1 hour later. If your blood sugar is higher than a certain level, another test will be ordered. You will fast and your blood sugar level will be tested. You will have the glucose drink. Your blood will be tested again 1 hour, 2 hours, and 3 hours after you finish the glucose drink. A Tdap vaccine  may be recommended by your healthcare provider. Fundal height  is a measurement of your uterus to check your baby's growth. This number is usually the same as the number of weeks that you have been pregnant.  Your healthcare provider may also check your baby's position. Your baby's heart rate  will be checked. Follow up with your obstetrician as directed:  Write down your questions so you remember to ask them during your visits. © Copyright [facility] 2023 Information is for End User's use only and may not be sold, redistributed or otherwise used for commercial purposes. The above information is an  only. It is not intended as medical advice for individual conditions or treatments. Talk to your doctor, nurse or pharmacist before following any medical regimen to see if it is safe and effective for you. Kick Counts in Pregnancy   AMBULATORY CARE:   Kick counts  measure how much your baby is moving in your womb. A kick from your baby can be felt as a twist, turn, swish, roll, or jab. It is common to feel your baby kicking at 26 to 28 weeks of pregnancy. You may feel your baby kick as early as 20 weeks of pregnancy. You may want to start counting at 28 weeks. Contact your doctor immediately if:   You feel a change in the number of kicks or movements of your baby. You feel fewer than 10 kicks within 2 hours. You have questions or concerns about your baby's movements. Why measure kick counts:  Your baby's movement may provide information about your baby's health. He or she may move less, or not at all, if there are problems. Your baby may move less if he or she is not getting enough oxygen or nutrition from the placenta. Do not smoke while you are pregnant. Smoking decreases the amount of oxygen that gets to your baby. Talk to your healthcare provider if you need help to quit smoking. Tell your healthcare provider as soon as you feel a change in your baby's movements. When to measure kick counts:   Measure kick counts at the same time every day. Measure kick counts when your baby is awake and most active. Your baby may be most active in the evening.     How to measure kick counts:  Check that your baby is awake before you measure kick counts. You can wake up your baby by lightly pushing on your belly, walking, or drinking something cold. Your healthcare provider may tell you different ways to measure kick counts. You may be told to do the following:  Use a chart or clock to keep track of the time you start and finish counting. Sit in a chair or lie on your left side. Place your hands on the largest part of your belly. Count until you reach 10 kicks. Write down how much time it takes to count 10 kicks. It may take 30 minutes to 2 hours to count 10 kicks. It should not take more than 2 hours to count 10 kicks. Follow up with your doctor as directed:  Write down your questions so you remember to ask them during your visits. © Copyright [employer] Flores 2023 Information is for End User's use only and may not be sold, redistributed or otherwise used for commercial purposes. The above information is an  only. It is not intended as medical advice for individual conditions or treatments. Talk to your doctor, nurse or pharmacist before following any medical regimen to see if it is safe and effective for you. Perineal Massage    Perineal massage is recommended starting after 34 weeks in order to reduce risks of perineal tearing during childbirth. You have been provided and instructional sheet in your yellow 28 week prenatal packet. Early Labor Signs   AMBULATORY CARE:   Early labor signs and symptoms  are the changes in your body that signal your baby is getting ready to be delivered. Early labor signs can happen weeks, days or hours before delivery. Call 911 for any of the following: You have heavy vaginal bleeding. You cannot get to the hospital before the baby starts to come out. Seek care immediately if:   You have regular, painful contractions that are less than 5 minutes apart and last 30 to 70 seconds each. You have a constant trickle or sudden gush of clear fluid from your vagina.     You notice a sudden decrease in your baby's movement. Contact your obstetrician or healthcare provider if:   You have pain in your lower back or abdomen that does not get better when you change positions. You have bloody mucus or show. You have questions or concerns about your condition or care. Early labor signs and symptoms:   Lightening  occurs when your baby drops inside your pelvis. You may feel increased pressure in your pelvis. This may happen a few weeks to a few hours before your labor begins. Contractions  are cramps and tightening that occur in your uterus to help move the baby through your birth canal. Contractions occur regularly and more often each time. Each one lasts about 30 to 70 seconds, and gets stronger until you deliver your baby. Contractions do not go away with movement. The pain usually starts in your lower back and moves to your abdomen. Effacement  occurs when your cervix softens and thins, so it can easily open for the baby. You will not be able to feel effacement. Your healthcare provider will examine your cervix for effacement. Dilation  is widening of your cervix. Your healthcare provider will examine your cervix for dilation. Your cervix may start to dilate weeks before your baby is delivered. Your cervix will be fully opened and ready for delivery when it is dilated to 10 centimeters. Increased discharge  from your vagina may occur. It may be brown, pink, clear, or slightly bloody. This discharge may also be called bloody show. Bloody show is a mucus plug that forms and blocks your cervix during pregnancy. The discharge may mean that your cervix is opening up and getting ready for delivery. Rupture of membranes  is a sudden release of clear fluid from your vagina. Ruptured membranes means your water broke. Your healthcare provider may need to break your water if it does not happen on its own. False labor:  You may have false labor signs, which are also called [place] Watson contractions. False labor is common and may happen several weeks or days before your actual labor. The contractions are not regular, and do not get closer together. The pain is usually mild, does not worsen, and is felt only in front. [place] Watson contractions may happen later in the day, and stop after you change position, walk, or rest.  Follow up with your obstetrician or healthcare provider as directed:  Write down your questions so you remember to ask them during your visit. © Copyright [employer] 2023 Information is for End User's use only and may not be sold, redistributed or otherwise used for commercial purposes. The above information is an  only. It is not intended as medical advice for individual conditions or treatments. Talk to your doctor, nurse or pharmacist before following any medical regimen to see if it is safe and effective for you.

## 2023-09-29 PROBLEM — Z3A.30 30 WEEKS GESTATION OF PREGNANCY: Status: ACTIVE | Noted: 2023-05-18

## 2023-09-29 NOTE — PROGRESS NOTES
A fetal ultrasound was completed. See Ob procedures in Epic for an interpretation and recommendations. Do not hesitate to contact us in [facility] if you have questions. Sera Chance MD, [address] [facility]  Maternal Fetal Medicine

## 2023-10-05 ENCOUNTER — SOCIAL WORK (OUTPATIENT)
Dept: BEHAVIORAL/MENTAL HEALTH CLINIC | Facility: CLINIC | Age: 28
End: 2023-10-05
Payer: COMMERCIAL

## 2023-10-05 DIAGNOSIS — F32.0 MILD MAJOR DEPRESSION (HCC): ICD-10-CM

## 2023-10-05 DIAGNOSIS — F41.9 ANXIETY: Primary | ICD-10-CM

## 2023-10-05 PROCEDURE — 90834 PSYTX W PT 45 MINUTES: CPT | Performed by: COUNSELOR

## 2023-10-05 NOTE — PSYCH
Behavioral Health Psychotherapy Progress Note    Psychotherapy Provided: Individual Psychotherapy     1. Anxiety        2. Mild major depression ([address])            Goals addressed in session: Goal 1     DATA: Chintan arrived for her individual session today. She expressed that her  still hasn't gotten the offer for the firefighting job and she is certain he will get it. She expressed that this is causing anxiety. She identified that she recognizes how taking this job wouldn't be helpful for their growing family and that she is trying to understand him, but is having a hard time. She expressed feeling angry with him and that she may resent him moving forward if he decides to take this job. We discussed how Chintan can lead with facts about each of the jobs in order for them to make the best decision for their family. During this session, this clinician used the following therapeutic modalities: Client-centered Therapy, Cognitive Behavioral Therapy and Supportive Psychotherapy    Substance Abuse was not addressed during this session. If the client is diagnosed with a co-occurring substance use disorder, please indicate any changes in the frequency or amount of use: n/as. Stage of change for addressing substance use diagnoses: No substance use/Not applicable    ASSESSMENT:  Elkin Perry presents with a Euthymic/ normal mood. her affect is Normal range and intensity, which is congruent, with her mood and the content of the session. The client has made progress on their goals. Elkin ePrry presents with a none risk of suicide, none risk of self-harm, and none risk of harm to others. For any risk assessment that surpasses a "low" rating, a safety plan must be developed. A safety plan was indicated: no  If yes, describe in detail n/a    PLAN: Between sessions, Elkin Perry will continue talking with her  regarding the decision to take this job.  At the next session, the therapist will use Client-centered Therapy, Cognitive Behavioral Therapy and Supportive Psychotherapy to address her anxiety and depression. Behavioral Health Treatment Plan and Discharge Planning: Ajay Helton is aware of and agrees to continue to work on their treatment plan. They have identified and are working toward their discharge goals.  yes    Visit start and stop times:    10/05/23  Start Time: 1874  Stop Time: 4663  Total Visit Time: 52 minutes

## 2023-10-11 ENCOUNTER — ROUTINE PRENATAL (OUTPATIENT)
Dept: OBGYN CLINIC | Facility: CLINIC | Age: 28
End: 2023-10-11
Payer: COMMERCIAL

## 2023-10-11 VITALS — DIASTOLIC BLOOD PRESSURE: 78 MMHG | WEIGHT: 208.2 LBS | SYSTOLIC BLOOD PRESSURE: 118 MMHG | BODY MASS INDEX: 34.65 KG/M2

## 2023-10-11 DIAGNOSIS — O44.40 LOW-LYING PLACENTA: ICD-10-CM

## 2023-10-11 DIAGNOSIS — Z34.93 PRENATAL CARE IN THIRD TRIMESTER: ICD-10-CM

## 2023-10-11 DIAGNOSIS — Z3A.33 33 WEEKS GESTATION OF PREGNANCY: Primary | ICD-10-CM

## 2023-10-11 DIAGNOSIS — Z23 NEED FOR TDAP VACCINATION: ICD-10-CM

## 2023-10-11 LAB
SL AMB  POCT GLUCOSE, UA: NORMAL
SL AMB POCT URINE PROTEIN: NORMAL

## 2023-10-11 PROCEDURE — 90715 TDAP VACCINE 7 YRS/> IM: CPT | Performed by: PHYSICIAN ASSISTANT

## 2023-10-11 PROCEDURE — PNV: Performed by: PHYSICIAN ASSISTANT

## 2023-10-11 PROCEDURE — 81002 URINALYSIS NONAUTO W/O SCOPE: CPT | Performed by: PHYSICIAN ASSISTANT

## 2023-10-11 PROCEDURE — 90471 IMMUNIZATION ADMIN: CPT | Performed by: PHYSICIAN ASSISTANT

## 2023-10-11 NOTE — ASSESSMENT & PLAN NOTE
Chintan  is a 34 y.o. Anastasiia Caba @33w0d who presents for routine prenatal visit. Reports a change in FM. Feels kick quality and pattern has changed. Was attempting kick counts last week and noted sometimes she would get 10 counts in ONE hour and sometimes not. Over past few days feels movement has been normal. Discussed expected changes in movement as the third trimester progresses. Encouraged to look for baby's schedule/patterning. Reviewed performance of kick counts highlighting 10 spontaneous movements in TWO hours. Advised she go home and perform. Reviewed importance of [address] and if unable to meet to go to triage for evaluation. She is agreeable. All questions answered. 28 wk labs - normal   TDAP - given today  Flu vaccine - declined. Counseling given. Will consider for next visit  Ped - has   Plans to breastfeed. Pump - has   Perineal massage - encouraged   Encouraged birthing and lactation classes. Denies LOF, vaginal bleeding, regular uterine contractions, cramping, headaches or visual changes. Reviewed PTL/Labor precautions and FKC.

## 2023-10-11 NOTE — PROGRESS NOTES
Problem List Items Addressed This Visit          Other    33 weeks gestation of pregnancy - Primary     [place]  is a 34 y.o. Kathryn Campbell @33w0d who presents for routine prenatal visit. Reports a change in FM. Feels kick quality and pattern has changed. Was attempting kick counts last week and noted sometimes she would get 10 counts in ONE hour and sometimes not. Over past few days feels movement has been normal. Discussed expected changes in movement as the third trimester progresses. Encouraged to look for baby's schedule/patterning. Reviewed performance of kick counts highlighting 10 spontaneous movements in TWO hours. Advised she go home and perform. Reviewed importance of [address] and if unable to meet to go to triage for evaluation. She is agreeable. All questions answered. 28 wk labs - normal   TDAP - given today  Flu vaccine - declined. Counseling given. Will consider for next visit  Ped - has   Plans to breastfeed. Pump - has   Perineal massage - encouraged   Encouraged birthing and lactation classes. Denies LOF, vaginal bleeding, regular uterine contractions, cramping, headaches or visual changes. Reviewed PTL/Labor precautions and FKC. Low-lying placenta     Resolution on US 9/20/2023. Normal growth and fluid. EFW 58%. No further US scheduled.            Other Visit Diagnoses       Prenatal care in third trimester        Relevant Orders    POCT urine dip (Completed)    Need for Tdap vaccination        Relevant Orders    TDAP VACCINE GREATER THAN OR EQUAL TO 8YO IM (Completed) [facility]  [address] [facility] [address]  [phone]      Jennifer Prince :2005 MRN:[id number]    3/8/2021 Time Session Began: 12:20 PM  Time Session Ended: 1:05 PM    Session Type:45 Minute Therapy (13992)    Others Present: none    Intervention: Behavioral, Cognitive, Supportive    Suicide/Homicide/Violence Ideation: No    If Yes, explain:     Current Outpatient Medications   Medication Sig   • Lisdexamfetamine Dimesylate (Vyvanse) 10 MG Cap Take 1 capsule by mouth daily (with breakfast).     No current facility-administered medications for this visit.        Change in Medication(s) Reported: No  If Yes, explain:     Patient/Family Education Provided: Yes  Patient/Family Displays Understanding: Yes    If No, explain:     Chief complaint in patient's own words: \"I am struggling with headaches.\"    Progress Note containing chief complaint and symptoms/problems related to the complaint:    (Data/Action/Response/Plan)    D:  Jennifer presented to an individual psychotherapy session. Patient reported that she is getting head aches at least 4-5 times a day. Her head aches last all day. Her headaches started with being loud in class.She has an IEP. We discussed talking to the school with her headaches. His dad agreed to talk to the school about wearing headphones to block noise out. Since medication, her dad sees her have more energy and more alert. She reported her depression has been at a 3, anxiety at a 3, and her ADD at a 4 (scale 1-10 and 10 being the worst). She talked about feeling down about not able to get ready for the test today. She was to busy to study.  She talked about how she struggles getting school work done due to noise at home. We discussed her staying after school to due her homework. She didn't like the idea of staying after school so her and her mom will come up with a routine to get school work  done and chores done. She talked about her worries of getting homework done. This therapist educated her on the self soothe skill and we went through examples. This therapist educated her on the tip skill and we went through examples. This therapist gave her a handout on self soothe skill and tip skill. We discussed her sleep pattern of taking hours to fall asleep. This therapist educated her on the sleep hygiene skill.      A: Jennifer presented motivated. Patient worked on the sleep hygiene skill, tip skill, and self soothe skill.  Patient's thoughts and feelings were processed and reframed to build insight. Provided support and validation. Cognitions shared by patient were acknowledged and exploration was encouraged.      R: Jennifer was alert and oriented x4.  Patient presented as calm and cooperative.  Mood appeared depressed with congruent affect.  Eye contact was appropriate. Speech was normal in rate, tone, and volume.  Motor activity was unremarkable.  Thought process was future oriented and goal directed.  Jennifer denied any suicidal or homicidal ideation.      P: Jennifer will return in two weeks or sooner if needed.  Reviewed 24-hour emergency access information.    Need for Community Resources Assessed: Yes    Resources Needed: No    If Yes, what resources:     Diagnosis: Major depressive disorder, recurrent episode, moderate (CMS/HCC)  (primary encounter diagnosis)  ADELINA (generalized anxiety disorder)  ADD (attention deficit disorder) without hyperactivity    Treatment Plan: Unchanged    Discharge Plan: Strategies Discussed to Maintain Gains    Next Appointment: 3/26/21  Josefina DUNN LCSW

## 2023-10-12 ENCOUNTER — APPOINTMENT (EMERGENCY)
Dept: RADIOLOGY | Facility: HOSPITAL | Age: 28
End: 2023-10-12
Payer: COMMERCIAL

## 2023-10-12 ENCOUNTER — TELEPHONE (OUTPATIENT)
Dept: OBGYN CLINIC | Facility: CLINIC | Age: 28
End: 2023-10-12

## 2023-10-12 ENCOUNTER — APPOINTMENT (EMERGENCY)
Dept: CT IMAGING | Facility: HOSPITAL | Age: 28
End: 2023-10-12
Payer: COMMERCIAL

## 2023-10-12 ENCOUNTER — HOSPITAL ENCOUNTER (EMERGENCY)
Facility: HOSPITAL | Age: 28
Discharge: HOME | End: 2023-10-12
Attending: EMERGENCY MEDICINE
Payer: COMMERCIAL

## 2023-10-12 VITALS
BODY MASS INDEX: 35.51 KG/M2 | WEIGHT: 208 LBS | OXYGEN SATURATION: 100 % | HEART RATE: 91 BPM | SYSTOLIC BLOOD PRESSURE: 137 MMHG | RESPIRATION RATE: 19 BRPM | TEMPERATURE: 98 F | HEIGHT: 64 IN | DIASTOLIC BLOOD PRESSURE: 73 MMHG

## 2023-10-12 DIAGNOSIS — O99.019 ANEMIA IN PREGNANCY: ICD-10-CM

## 2023-10-12 DIAGNOSIS — R07.89 ATYPICAL CHEST PAIN: Primary | ICD-10-CM

## 2023-10-12 DIAGNOSIS — F41.8 DEPRESSION WITH ANXIETY: ICD-10-CM

## 2023-10-12 LAB
ALBUMIN SERPL BCP-MCNC: 3.6 G/DL (ref 3.5–5)
ALP SERPL-CCNC: 151 U/L (ref 34–104)
ALT SERPL W P-5'-P-CCNC: 17 U/L (ref 7–52)
ANION GAP SERPL CALCULATED.3IONS-SCNC: 9 MMOL/L
ANISOCYTOSIS BLD QL SMEAR: PRESENT
AST SERPL W P-5'-P-CCNC: 22 U/L (ref 13–39)
BASOPHILS # BLD MANUAL: 0 THOUSAND/UL (ref 0–0.1)
BASOPHILS NFR MAR MANUAL: 0 % (ref 0–1)
BILIRUB SERPL-MCNC: 0.36 MG/DL (ref 0.2–1)
BUN SERPL-MCNC: 5 MG/DL (ref 5–25)
CALCIUM SERPL-MCNC: 9 MG/DL (ref 8.4–10.2)
CARDIAC TROPONIN I PNL SERPL HS: 3 NG/L
CHLORIDE SERPL-SCNC: 102 MMOL/L (ref 96–108)
CO2 SERPL-SCNC: 23 MMOL/L (ref 21–32)
CREAT SERPL-MCNC: 0.55 MG/DL (ref 0.6–1.3)
D DIMER PPP FEU-MCNC: 2.05 UG/ML FEU
EOSINOPHIL # BLD MANUAL: 0 THOUSAND/UL (ref 0–0.4)
EOSINOPHIL NFR BLD MANUAL: 0 % (ref 0–6)
ERYTHROCYTE [DISTWIDTH] IN BLOOD BY AUTOMATED COUNT: 13.1 % (ref 11.6–15.1)
GFR SERPL CREATININE-BSD FRML MDRD: 129 ML/MIN/1.73SQ M
GLUCOSE SERPL-MCNC: 96 MG/DL (ref 65–140)
HCT VFR BLD AUTO: 33.9 % (ref 34.8–46.1)
HGB BLD-MCNC: 11.1 G/DL (ref 11.5–15.4)
LG PLATELETS BLD QL SMEAR: PRESENT
LYMPHOCYTES # BLD AUTO: 19 % (ref 14–44)
LYMPHOCYTES # BLD AUTO: 2.38 THOUSAND/UL (ref 0.6–4.47)
MACROCYTES BLD QL AUTO: PRESENT
MCH RBC QN AUTO: 28.8 PG (ref 26.8–34.3)
MCHC RBC AUTO-ENTMCNC: 32.7 G/DL (ref 31.4–37.4)
MCV RBC AUTO: 88 FL (ref 82–98)
MONOCYTES # BLD AUTO: 1.38 THOUSAND/UL (ref 0–1.22)
MONOCYTES NFR BLD: 11 % (ref 4–12)
MYELOCYTES NFR BLD MANUAL: 1 % (ref 0–1)
NEUTROPHILS # BLD MANUAL: 8.65 THOUSAND/UL (ref 1.85–7.62)
NEUTS BAND NFR BLD MANUAL: 2 % (ref 0–8)
NEUTS SEG NFR BLD AUTO: 67 % (ref 43–75)
NRBC BLD AUTO-RTO: 1 /100 WBC (ref 0–2)
PLATELET # BLD AUTO: 243 THOUSANDS/UL (ref 149–390)
PLATELET BLD QL SMEAR: ADEQUATE
PMV BLD AUTO: 9.5 FL (ref 8.9–12.7)
POLYCHROMASIA BLD QL SMEAR: PRESENT
POTASSIUM SERPL-SCNC: 3.7 MMOL/L (ref 3.5–5.3)
PROT SERPL-MCNC: 6.7 G/DL (ref 6.4–8.4)
RBC # BLD AUTO: 3.86 MILLION/UL (ref 3.81–5.12)
RBC MORPH BLD: PRESENT
SODIUM SERPL-SCNC: 134 MMOL/L (ref 135–147)
TSH SERPL DL<=0.05 MIU/L-ACNC: 1.36 UIU/ML (ref 0.45–4.5)
WBC # BLD AUTO: 12.53 THOUSAND/UL (ref 4.31–10.16)

## 2023-10-12 PROCEDURE — 71045 X-RAY EXAM CHEST 1 VIEW: CPT

## 2023-10-12 PROCEDURE — 36415 COLL VENOUS BLD VENIPUNCTURE: CPT

## 2023-10-12 PROCEDURE — 85007 BL SMEAR W/DIFF WBC COUNT: CPT

## 2023-10-12 PROCEDURE — 84443 ASSAY THYROID STIM HORMONE: CPT

## 2023-10-12 PROCEDURE — 84484 ASSAY OF TROPONIN QUANT: CPT

## 2023-10-12 PROCEDURE — 93005 ELECTROCARDIOGRAM TRACING: CPT

## 2023-10-12 PROCEDURE — 85379 FIBRIN DEGRADATION QUANT: CPT

## 2023-10-12 PROCEDURE — 99285 EMERGENCY DEPT VISIT HI MDM: CPT | Performed by: EMERGENCY MEDICINE

## 2023-10-12 PROCEDURE — 85027 COMPLETE CBC AUTOMATED: CPT

## 2023-10-12 PROCEDURE — 99285 EMERGENCY DEPT VISIT HI MDM: CPT

## 2023-10-12 PROCEDURE — 80053 COMPREHEN METABOLIC PANEL: CPT

## 2023-10-12 PROCEDURE — 71275 CT ANGIOGRAPHY CHEST: CPT

## 2023-10-12 RX ORDER — SERTRALINE HYDROCHLORIDE 100 MG/1
100 TABLET, FILM COATED ORAL DAILY
Qty: 90 TABLET | Refills: 0 | Status: SHIPPED | OUTPATIENT
Start: 2023-10-12

## 2023-10-12 RX ADMIN — IOHEXOL 85 ML: 350 INJECTION, SOLUTION INTRAVENOUS at 16:53

## 2023-10-12 NOTE — TELEPHONE ENCOUNTER
Tdap yesterday- ( had one in 2017 and in 2008)    chest tightness last night, feels like she can't catch a breath, feels anxious. HR . Denies hx of asthma, allergies, or cold symptoms. Patient has a hx of anxiety/     Advised patient to go to  ER for evaluation. Patient verbalized understanding.

## 2023-10-12 NOTE — ED PROVIDER NOTES
History  Chief Complaint   Patient presents with    Chest Pain     Pt states that she started with CP, palpitations, and SOB last night. Pt is 33wks pregnant. States that she woke up with the same feeling today, called her OB/GYN, and was advised to come here     Luís is a 34 y.o. female who has a past medical history of Anxiety, Depression, current  33wk presented in ED visit for chest pain. Patient stated she started feeling chest pressure, palpitation and shortness of breath since last night. It was hard for her to fall asleep. When she woke up this morning, the chest pressure/ pain persistently presented which located substernal and nonradiating. She also reported transient headache and dizziness this morning. She denied URI symptoms, fever/chills, NVD, abdominal pain, lower extremity edema/pain. Prior to Admission Medications   Prescriptions Last Dose Informant Patient Reported? Taking?    Prenatal MV-Min-Fe Fum-FA-DHA (PRENATAL+DHA PO)  Self Yes No   Sig: Take by mouth   aspirin (ECOTRIN LOW STRENGTH) 81 mg EC tablet  Self No No   Sig: Take 2 tablets (162 mg total) by mouth daily   sertraline (ZOLOFT) 100 mg tablet   No No   Sig: Take 1 tablet (100 mg total) by mouth daily      Facility-Administered Medications: None       Past Medical History:   Diagnosis Date    Allergic rhinitis due to pollen     LAST ASSESSED [id number]    Anxiety     Depression     Has not taken medication for 6 months    Dysmenorrhea     LAST ASSESSED [id number]    Globus sensation     LAST ASSESSED [id number]    Wrist sprain     LAST ASSESSED [id number]       Past Surgical History:   Procedure Laterality Date    NO PAST SURGERIES         Family History   Problem Relation Age of Onset    Other Mother         IDIOPATHIC THROMBOCYTOPENIC PURPURA    Alcohol abuse Father     Anemia Father     No Known Problems Sister     No Known Problems Maternal Grandmother     Esophageal cancer Maternal Grandfather     Bone cancer Maternal Grandfather     Cancer Maternal Grandfather     No Known Problems Paternal Grandmother     Multiple sclerosis Paternal Grandfather      I have reviewed and agree with the history as documented. E-Cigarette/Vaping    E-Cigarette Use Never User      E-Cigarette/Vaping Substances    Nicotine No     THC No     CBD No     Flavoring No     Other No     Unknown No      Social History     Tobacco Use    Smoking status: Never    Smokeless tobacco: Never   Vaping Use    Vaping Use: Never used   Substance Use Topics    Alcohol use: Not Currently     Alcohol/week: 1.0 standard drink of alcohol     Types: 1 Glasses of wine per week    Drug use: No        Review of Systems   Constitutional:  Negative for chills and fever. HENT:  Negative for ear pain, sinus pressure, sinus pain and sore throat. Eyes:  Negative for pain and visual disturbance. Respiratory:  Positive for chest tightness and shortness of breath. Negative for cough and wheezing. Cardiovascular:  Positive for chest pain and palpitations. Negative for leg swelling. Gastrointestinal:  Negative for abdominal pain, blood in stool, constipation, diarrhea, nausea and vomiting. Endocrine: Negative for cold intolerance and heat intolerance. Genitourinary:  Negative for dysuria, flank pain, frequency, hematuria, pelvic pain and urgency. Musculoskeletal:  Negative for arthralgias, back pain and neck pain. Skin:  Negative for color change and rash. Neurological:  Positive for dizziness and headaches. Negative for seizures, syncope, facial asymmetry, speech difficulty, weakness and numbness. Psychiatric/Behavioral:  Negative for agitation, behavioral problems, confusion, decreased concentration, dysphoric mood and hallucinations. All other systems reviewed and are negative.       Physical Exam  ED Triage Vitals [10/12/23 1444]   Temperature Pulse Respirations Blood Pressure SpO2   98 °F (36.7 °C) 91 19 137/73 100 %      Temp Source Heart Rate Source Patient Position - Orthostatic VS BP Location FiO2 (%)   Oral Monitor Sitting Right arm --      Pain Score       --             Orthostatic Vital Signs  Vitals:    10/12/23 1444   BP: 137/73   Pulse: 91   Patient Position - Orthostatic VS: Sitting       Physical Exam  Vitals and nursing note reviewed. Constitutional:       General: She is not in acute distress. Appearance: She is well-developed. She is not ill-appearing. HENT:      Head: Normocephalic and atraumatic. Right Ear: External ear normal.      Left Ear: External ear normal.      Nose: No congestion or rhinorrhea. Mouth/Throat:      Mouth: Mucous membranes are moist.   Eyes:      General: No scleral icterus. Right eye: No discharge. Left eye: No discharge. Extraocular Movements: Extraocular movements intact. Conjunctiva/sclera: Conjunctivae normal.   Neck:      Thyroid: No thyromegaly. Vascular: No JVD. Cardiovascular:      Rate and Rhythm: Regular rhythm. Tachycardia present. Heart sounds: No murmur heard. Pulmonary:      Effort: Pulmonary effort is normal. Tachypnea present. No accessory muscle usage or respiratory distress. Breath sounds: Normal breath sounds. No stridor. No wheezing, rhonchi or rales. Chest:      Chest wall: No tenderness or edema. Abdominal:      General: Bowel sounds are normal.      Palpations: Abdomen is soft. Tenderness: There is no abdominal tenderness. There is no right CVA tenderness, left CVA tenderness, guarding or rebound. Musculoskeletal:         General: No swelling or tenderness. Cervical back: Normal range of motion and neck supple. No rigidity or tenderness. Right lower leg: No tenderness. No edema. Left lower leg: No tenderness. No edema. Lymphadenopathy:      Cervical: No cervical adenopathy. Skin:     General: Skin is warm and dry. Capillary Refill: Capillary refill takes less than 2 seconds.    Neurological: General: No focal deficit present. Mental Status: She is alert and oriented to person, place, and time. Cranial Nerves: No cranial nerve deficit. Psychiatric:         Behavior: Behavior normal.         Thought Content: Thought content normal.         Judgment: Judgment normal.         ED Medications  Medications   iohexol (OMNIPAQUE) 350 MG/ML injection (MULTI-DOSE) 85 mL (85 mL Intravenous Given 10/12/23 1653)       Diagnostic Studies  Results Reviewed       Procedure Component Value Units Date/Time    RBC Morphology Reflex Test [[id number]] Collected: 10/12/23 1517    Lab Status: Final result Specimen: Blood from Arm, Left Updated: 10/12/23 1701    CBC and differential [[id number]]  (Abnormal) Collected: 10/12/23 1517    Lab Status: Final result Specimen: Blood from Arm, Left Updated: 10/12/23 1653     WBC 12.53 Thousand/uL      RBC 3.86 Million/uL      Hemoglobin 11.1 g/dL      Hematocrit 33.9 %      MCV 88 fL      MCH 28.8 pg      MCHC 32.7 g/dL      RDW 13.1 %      MPV 9.5 fL      Platelets 480 Thousands/uL     Narrative: This is an appended report. These results have been appended to a previously verified report.     Manual Differential(PHLEBS Do Not Order) [[id number]]  (Abnormal) Collected: 10/12/23 1517    Lab Status: Final result Specimen: Blood from Arm, Left Updated: 10/12/23 1653     Segmented % 67 %      Bands % 2 %      Lymphocytes % 19 %      Monocytes % 11 %      Eosinophils, % 0 %      Basophils % 0 %      Myelocytes % 1 %      Absolute Neutrophils 8.65 Thousand/uL      Lymphocytes Absolute 2.38 Thousand/uL      Monocytes Absolute 1.38 Thousand/uL      Eosinophils Absolute 0.00 Thousand/uL      Basophils Absolute 0.00 Thousand/uL      Total Counted --     nRBC 1 /100 WBC      RBC Morphology Present     Platelet Estimate Adequate     Large Platelet Present     Anisocytosis Present     Macrocytes Present     Polychromasia Present    TSH, 3rd generation with Free T4 reflex [[id number]] (Normal) Collected: 10/12/23 1517    Lab Status: Final result Specimen: Blood from Arm, Left Updated: 10/12/23 1651     TSH 3RD GENERATON 1.365 uIU/mL     D-dimer, quantitative [[id number]]  (Abnormal) Collected: 10/12/23 1517    Lab Status: Final result Specimen: Blood from Arm, Left Updated: 10/12/23 1618     D-Dimer, Quant 2.05 ug/ml FEU     HS Troponin 0hr (reflex protocol) [[id number]]  (Normal) Collected: 10/12/23 1517    Lab Status: Final result Specimen: Blood from Arm, Left Updated: 10/12/23 1606     hs TnI 0hr 3 ng/L     Comprehensive metabolic panel [[id number]]  (Abnormal) Collected: 10/12/23 1517    Lab Status: Final result Specimen: Blood from Arm, Left Updated: 10/12/23 1559     Sodium 134 mmol/L      Potassium 3.7 mmol/L      Chloride 102 mmol/L      CO2 23 mmol/L      ANION GAP 9 mmol/L      BUN 5 mg/dL      Creatinine 0.55 mg/dL      Glucose 96 mg/dL      Calcium 9.0 mg/dL      AST 22 U/L      ALT 17 U/L      Alkaline Phosphatase 151 U/L      Total Protein 6.7 g/dL      Albumin 3.6 g/dL      Total Bilirubin 0.36 mg/dL      eGFR 129 ml/min/1.73sq m     Narrative:      [place] guidelines for Chronic Kidney Disease (CKD):     Stage 1 with normal or high GFR (GFR > 90 mL/min/1.73 square meters)    Stage 2 Mild CKD (GFR = 60-89 mL/min/1.73 square meters)    Stage 3A Moderate CKD (GFR = 45-59 mL/min/1.73 square meters)    Stage 3B Moderate CKD (GFR = 30-44 mL/min/1.73 square meters)    Stage 4 Severe CKD (GFR = 15-29 mL/min/1.73 square meters)    Stage 5 End Stage CKD (GFR <15 mL/min/1.73 square meters)  Note: GFR calculation is accurate only with a steady state creatinine                   CTA ED chest PE Study   Final Result by Roman James MD (10/12 1659)      No evidence of acute pulmonary embolus, thoracic aortic aneurysm or dissection. No acute cardiopulmonary process.                   Workstation performed: [id number]         XR chest 1 view portable   Final Result by Brenda Tate MD (10/12 1646)      No acute cardiopulmonary disease. Workstation performed: [id number]               Procedures  ECG 12 Lead Documentation Only    Date/Time: 10/12/2023 3:33 PM    Performed by: Juancarlos Blanchard MD  Authorized by: Juancarlos Blanchard MD    ECG reviewed by me, the ED Provider: yes    Patient location:  ED and bedside  Previous ECG:     Comparison to cardiac monitor: Yes    Interpretation:     Interpretation: normal    Rate:     ECG rate:  87    ECG rate assessment: normal    Rhythm:     Rhythm: sinus rhythm    Ectopy:     Ectopy: none    QRS:     QRS axis:  Normal    QRS intervals:  Normal  Conduction:     Conduction: normal    ST segments:     ST segments:  Normal  T waves:     T waves: normal    Comments:      NSR with no significant ST-T change        ED Course  ED Course as of 10/12/23 1715   Thu Oct 12, 2023   1449 Blood Pressure: 137/73   1450 Temperature: 98 °F (36.7 °C)   1450 Respirations: 19   1450 Pulse: 91   1450 SpO2: 100 %   1532 ECG 12 lead  NSR with no sig STT changes. 1608 hs TnI 0hr: 3   1659 Hemoglobin(!): 11.1  Anemia   1700 WBC(!): 12.53   1700 TSH 3RD GENERATON: 1.365  wnl   1700 D-Dimer, Quant(!): 2.05   1703 XR chest 1 view portable  No acute cardiopulmonary findings. 1 CTA ED chest PE Study  No evidence of acute pulmonary embolus, thoracic aortic aneurysm or dissection. No acute cardiopulmonary process. SBIRT 20yo+      Flowsheet Row Most Recent Value   Initial Alcohol Screen: US AUDIT-C     1. How often do you have a drink containing alcohol? 0 Filed at: 10/12/2023 1511   2. How many drinks containing alcohol do you have on a typical day you are drinking? 0 Filed at: 10/12/2023 1511   3b. FEMALE Any Age, or MALE 65+: How often do you have 4 or more drinks on one occassion? 0 Filed at: 10/12/2023 1511   Audit-C Score 0 Filed at: 10/12/2023 1511   OLLIE: How many times in the past year have you. ..     Used an illegal drug or used a prescription medication for non-medical reasons? Never Filed at: 10/12/2023 1511                  Medical Decision Making  34 y.o. female who has a past medical history of Anxiety, Depression, current  33wk presented in ED visit for evaluation of chest pain, palpitation and SOB started last night. Persistent, pressure-like, nonradiating. Denied URI symptoms, fever/chills, NVD, abdominal pain, lower extremity edema/pain. On physical, tachycardia and tachypnea. Negative for bilateral lower extremity edema/tenderness. Differential diagnosis includes ACS, PE, anemia, anxiety, hyperthyroidism, DVT, DVT, pneumonia, pulmonary edema. We will obtain CBC, CMP, troponin, D-dimer, TSH, CXR. CBC revealed anemia. Troponin negative. Tsh WNL. D-dimer 2.05- PE cannot be excluded. CTA PE study obtained and was negative for PE. Amount and/or Complexity of Data Reviewed  External Data Reviewed: labs, radiology, ECG and notes. Labs: ordered. Decision-making details documented in ED Course. Radiology: ordered. Decision-making details documented in ED Course. ECG/medicine tests: ordered. Decision-making details documented in ED Course. Risk  Prescription drug management. Disposition  Final diagnoses:   Atypical chest pain   Anemia in pregnancy     Time reflects when diagnosis was documented in both MDM as applicable and the Disposition within this note       Time User Action Codes Description Comment    10/12/2023  5:14 PM Essie Muta Add [R07.89] Atypical chest pain     10/12/2023  5:14 PM Essie Muta Add [O99.019] Anemia in pregnancy           ED Disposition       ED Disposition   Discharge    Condition   Stable    Date/Time   Thu Oct 12, 2023 [facility] discharge to home/self care.                    Follow-up Information       Follow up With Specialties Details Why [address] [facility] [address], DO Family Medicine Schedule an appointment as soon as possible for a visit in [address] [facility]   [address] [facility] [place] [address]  [phone]              Current Discharge Medication List        CONTINUE these medications which have NOT CHANGED    Details   aspirin (ECOTRIN LOW STRENGTH) 81 mg EC tablet Take 2 tablets (162 mg total) by mouth daily  Qty: 60 tablet, Refills: 5    Associated Diagnoses: 13 weeks gestation of pregnancy      Prenatal MV-Min-Fe Fum-FA-DHA (PRENATAL+DHA PO) Take by mouth      sertraline (ZOLOFT) 100 mg tablet Take 1 tablet (100 mg total) by mouth daily  Qty: 90 tablet, Refills: 0    Associated Diagnoses: Depression with anxiety           No discharge procedures on file. PDMP Review         Value Time User    PDMP Reviewed  Yes 3/16/2023 12:56 PM Marge Guillen DO             ED Provider  Attending physically available and evaluated Luís. I managed the patient along with the ED Attending.     Electronically Signed by               Zuleima New MD  10/12/23 6601

## 2023-10-14 ENCOUNTER — CLINICAL SUPPORT (OUTPATIENT)
Age: 28
End: 2023-10-14

## 2023-10-14 DIAGNOSIS — Z32.2 ENCOUNTER FOR CHILDBIRTH INSTRUCTION: Primary | ICD-10-CM

## 2023-10-15 LAB
ATRIAL RATE: 87 BPM
P AXIS: 50 DEGREES
PR INTERVAL: 132 MS
QRS AXIS: 42 DEGREES
QRSD INTERVAL: 80 MS
QT INTERVAL: 360 MS
QTC INTERVAL: 433 MS
T WAVE AXIS: 54 DEGREES
VENTRICULAR RATE: 87 BPM

## 2023-10-15 PROCEDURE — 93010 ELECTROCARDIOGRAM REPORT: CPT | Performed by: INTERNAL MEDICINE

## 2023-10-20 NOTE — PROGRESS NOTES
Prenatal Visit   34w6d    PN1 completed  Nuchal completed   AFP completed  Level 2 completed  28 week labs completed   Consents signed  Breast pump ordered   Tdap UTD  Flu vaccine declined     Denies LOF, VB, or CTX.   Pt has +FM  Patients urine is   Patient has no questions/concerns today

## 2023-10-24 ENCOUNTER — SOCIAL WORK (OUTPATIENT)
Dept: BEHAVIORAL/MENTAL HEALTH CLINIC | Facility: CLINIC | Age: 28
End: 2023-10-24
Payer: COMMERCIAL

## 2023-10-24 ENCOUNTER — OFFICE VISIT (OUTPATIENT)
Dept: FAMILY MEDICINE CLINIC | Facility: CLINIC | Age: 28
End: 2023-10-24
Payer: COMMERCIAL

## 2023-10-24 ENCOUNTER — ROUTINE PRENATAL (OUTPATIENT)
Dept: OBGYN CLINIC | Facility: CLINIC | Age: 28
End: 2023-10-24
Payer: COMMERCIAL

## 2023-10-24 VITALS
HEIGHT: 64 IN | RESPIRATION RATE: 18 BRPM | WEIGHT: 208.6 LBS | HEART RATE: 86 BPM | DIASTOLIC BLOOD PRESSURE: 74 MMHG | OXYGEN SATURATION: 99 % | TEMPERATURE: 95.9 F | BODY MASS INDEX: 35.61 KG/M2 | SYSTOLIC BLOOD PRESSURE: 130 MMHG

## 2023-10-24 VITALS — BODY MASS INDEX: 36.05 KG/M2 | SYSTOLIC BLOOD PRESSURE: 110 MMHG | DIASTOLIC BLOOD PRESSURE: 64 MMHG | WEIGHT: 210 LBS

## 2023-10-24 DIAGNOSIS — F41.8 DEPRESSION WITH ANXIETY: ICD-10-CM

## 2023-10-24 DIAGNOSIS — F41.9 ANXIETY: ICD-10-CM

## 2023-10-24 DIAGNOSIS — F32.0 MILD MAJOR DEPRESSION (HCC): Primary | ICD-10-CM

## 2023-10-24 DIAGNOSIS — Z3A.34 34 WEEKS GESTATION OF PREGNANCY: Primary | ICD-10-CM

## 2023-10-24 DIAGNOSIS — O44.40 LOW-LYING PLACENTA: ICD-10-CM

## 2023-10-24 DIAGNOSIS — R00.2 PALPITATIONS: ICD-10-CM

## 2023-10-24 LAB
SL AMB  POCT GLUCOSE, UA: NORMAL
SL AMB POCT URINE PROTEIN: NORMAL

## 2023-10-24 PROCEDURE — 81002 URINALYSIS NONAUTO W/O SCOPE: CPT | Performed by: PHYSICIAN ASSISTANT

## 2023-10-24 PROCEDURE — 99213 OFFICE O/P EST LOW 20 MIN: CPT | Performed by: FAMILY MEDICINE

## 2023-10-24 PROCEDURE — PNV: Performed by: PHYSICIAN ASSISTANT

## 2023-10-24 PROCEDURE — 90834 PSYTX W PT 45 MINUTES: CPT | Performed by: COUNSELOR

## 2023-10-24 RX ORDER — SERTRALINE HYDROCHLORIDE 100 MG/1
100 TABLET, FILM COATED ORAL DAILY
Qty: 90 TABLET | Refills: 1 | Status: SHIPPED | OUTPATIENT
Start: 2023-10-24

## 2023-10-24 NOTE — PROGRESS NOTES
34 weeks gestation of pregnancy  Chintan  is a 34 y.o. Dereck Pantoja @34w6d who presents for routine prenatal visit. Discussed ARRIVE trial and option of 39 wk eIOL which patient expresses interest in. Will plan to send request at 38 wk appt    Growth scan- EFW58% at 30 wks; resolved low lying placenta  TDAP up to date  Flu vaccine- declined; counseling provided  GBS due next visit  Delivery plan submitted; consents signed    Reports good fetal movement. Denies LOF, vaginal bleeding, regular uterine contractions, cramping, headaches or visual changes. Reviewed PTL/Labor precautions and FKC. Depression with anxiety  Following closely with therapist and PCP for management  Zoloft dose was adjusted to 100 mg in mid September  Has noted some improvement with dose increase but does not feel 100% improvement yet. Met with PCP today and has elected to continue on current dose and monitor symptoms. Will message them if she feels she could benefit from dose adjustment as she gets closer to delivery and into the postpartum window  Reviewed precautions    Low-lying placenta  Resolved on 30 wk scan    Palpitations  Evaluated in ED on 10/12/23 for CP, SOB and palpitations  Had normal work up inpatient- normal CBC, CMP, troponin, TSH. Also completed normal ECG and CTA and CXR    She reports persistent instances of SOB and palpitations. States episodes are present approx 2 times daily. Notices symptoms are prominent when lying down. Does reports some mild chest pain when episodes are present. Denies associated dizziness or pre-syncope  Will plan to place cardio referral today  Orders placed for echo to be completed prior to seeing cardiology.   Reviewed ED precautions

## 2023-10-24 NOTE — PSYCH
Behavioral Health Psychotherapy Progress Note    Psychotherapy Provided: Individual Psychotherapy     1. Mild major depression ([address])        2. Anxiety            Goals addressed in session: Goal 1     DATA: Chintan arrived for her individual session today. She continued to process through her 's decision to continue to fight for this fighter fighting job. She explained that he now has an interview with another place as well for the job. She expressed that she's having a hard time thinking differently about the situation and can't help, but recognize that he's selfish and how difficult this will be for the family. Chintan stated that she hasn't had to live pay check to pay check in a long time. She recognizes that she has control over this and doesn't want it to be this way. We processed through her feelings discussing different ways that Chintan can express herself to her . During this session, this clinician used the following therapeutic modalities: Client-centered Therapy, Cognitive Behavioral Therapy, and Supportive Psychotherapy    Substance Abuse was not addressed during this session. If the client is diagnosed with a co-occurring substance use disorder, please indicate any changes in the frequency or amount of use: n/a. Stage of change for addressing substance use diagnoses: No substance use/Not applicable    ASSESSMENT:  Taya Arce presents with a Euthymic/ normal mood. her affect is Normal range and intensity, which is congruent, with her mood and the content of the session. The client has made progress on their goals. Chintan was open to processing through her struggles regarding her relationship with her . She's having a difficult time accepting her  wanting to move jobs for a lower amount of money. She doesn't feel it's a good idea, but is having a difficult time telling him this.   Taya Arce presents with a none risk of suicide, none risk of self-harm, and none risk of harm to others. For any risk assessment that surpasses a "low" rating, a safety plan must be developed. A safety plan was indicated: no  If yes, describe in detail n/a    PLAN: Between sessions, Daniel Mitchell will continue expressing herself. At the next session, the therapist will use Client-centered Therapy, Cognitive Behavioral Therapy, and Supportive Psychotherapy to address her anxiety and depression. Behavioral Health Treatment Plan and Discharge Planning: Daniel Mitchell is aware of and agrees to continue to work on their treatment plan. They have identified and are working toward their discharge goals.  yes    Visit start and stop times:    10/24/23  Start Time: 1249  Stop Time: 1331  Total Visit Time: 42 minutes

## 2023-10-24 NOTE — BH TREATMENT PLAN
[facility] [address]  1995      Date of Initial Psychotherapy Assessment: 6/29/2022  Date of Current Treatment Plan: 06/01/23  Treatment Plan Target Date: 4/21/2024  Treatment Plan Expiration Date: 4/21/2024     Diagnosis:   1. Depression with anxiety                Area(s) of Need: coping skills to manage her symptoms, process through her past, and set boundaries      Long Term Goal 1 (in the client's own words): Chintan will process through her past and move forward     Progress: Chintan has been focused on processing through her relationship with her  as she found out she was pregnant and  has been dealing with some changes to their life because of this. Stage of Change: Action     Target Date for completion: TBD             Anticipated therapeutic modalities: Solution-Focused Therapy, Mindfulness-Based Therapy, Client-Centered Therapy, Cognitive Behavioral Therapy, Supportive Therapy             People identified to complete this goal: Chintan and Therapist                     Objective 1: (identify the means of measuring success in meeting the objective): Chintan will identify her feelings regarding her past                    Objective 2: (identify the means of measuring success in meeting the objective): Chintan will develop cognitive restructuring skills in order to move on from her past      Long Term Goal 2 (in the client's own words): Chintan will improve herself and how she acts towards others     Progress: Chintan continues to work on this and has been building her confidence over time in expressing herself to others.      Stage of Change: Action     Target Date for completion: TBD             Anticipated therapeutic modalities: Chintan will develop cognitive restructuring skills in order to move on from her past             People identified to complete this goal: Chintan and Therapist                     Objective 1: (identify the means of measuring success in meeting the objective): [place] will identify how she would like to treat others                    Objective 2: (identify the means of measuring success in meeting the objective): [place] will work on empowering herself        I am currently under the care of a [place] psychiatric provider: yes     My [facility]'s psychiatric provider is: [facility] [place]     I am currently taking psychiatric medications: Yes, as prescribed by PCP     I feel that I will be ready for discharge from mental health care when I reach the following (measurable goal/objective): "Never"      For children and adults who have a legal guardian:          Has there been any change to custody orders and/or guardianship status? NA. If yes, attach updated documentation. I have created my Crisis Plan and have been offered a copy of this plan     [id number]: Diagnosis and Treatment Plan explained to [place] acknowledges an understanding of their diagnosis. Bernardino Jose agrees to this treatment plan.      I have been offered a copy of this Treatment Plan. yes

## 2023-10-24 NOTE — ASSESSMENT & PLAN NOTE
Dale Robb  is a 34 y.o. Macie Rendon @34w6d who presents for routine prenatal visit. Discussed ARRIVE trial and option of 39 wk eIOL which patient expresses interest in. Will plan to send request at 38 wk appt    Growth scan- EFW58% at 30 wks; resolved low lying placenta  TDAP up to date  Flu vaccine- declined; counseling provided  GBS due next visit  Delivery plan submitted; consents signed    Reports good fetal movement. Denies LOF, vaginal bleeding, regular uterine contractions, cramping, headaches or visual changes. Reviewed PTL/Labor precautions and FKC.

## 2023-10-24 NOTE — ASSESSMENT & PLAN NOTE
Evaluated in ED on 10/12/23 for CP, SOB and palpitations  Had normal work up inpatient- normal CBC, CMP, troponin, TSH. Also completed normal ECG and CTA and CXR    She reports persistent instances of SOB and palpitations. States episodes are present approx 2 times daily. Notices symptoms are prominent when lying down. Does reports some mild chest pain when episodes are present. Denies associated dizziness or pre-syncope  Will plan to place cardio referral today  Orders placed for echo to be completed prior to seeing cardiology.   Reviewed ED precautions

## 2023-10-30 NOTE — PROGRESS NOTES
Name: Bev Randle      : 1995      MRN: [id number]  Encounter Provider: Jason Terrell DO  Encounter Date: 10/24/2023   Encounter department: [facility]     1. Depression with anxiety  Assessment & Plan:  - Stable. Increased Zoloft to 100 mg September. Does note some improvement. Still relates not 100% however after further discussion we will continue at 100 mg for now. -Advised to follow-up closely especially in the postpartum period to discuss increase as needed.  -Continue following with behavioral therapist as scheduled.  -Discussed mindfulness based stress reduction as additional treatment as well.  -Follow-up in 4 to 6 months as needed. Orders:  -     sertraline (ZOLOFT) 100 mg tablet; Take 1 tablet (100 mg total) by mouth daily           Marcelo Woodson is a 24-year-old female who is currently 35 weeks pregnant with past medical history of depression and anxiety who is following up today for continued management of her depression anxiety. At her last visit we have increased her Zoloft to 100 mg p.o. daily due to some increasing symptoms of depression. She notes she has had improvement. She is does not believe that she is 100% better however she is managing well without any concerns. She continues to follow-up with her therapist regularly. She denies any SI or HI. After further discussion we we will maintain her at 100 mg Zoloft. Discussed following up in the postpartum window as needed for further management. No other concerns today. Review of Systems   Constitutional:  Negative for chills and fever. HENT:  Negative for ear pain and sore throat. Eyes:  Negative for pain and visual disturbance. Respiratory:  Negative for cough and shortness of breath. Cardiovascular:  Negative for chest pain and palpitations. Gastrointestinal:  Negative for abdominal pain and vomiting.    Genitourinary:  Negative for difficulty urinating, dysuria and hematuria. Musculoskeletal:  Negative for arthralgias and back pain. Skin:  Negative for color change and rash. Neurological:  Negative for dizziness, seizures, syncope and weakness. Psychiatric/Behavioral:  Positive for dysphoric mood. Negative for confusion, sleep disturbance and suicidal ideas. The patient is nervous/anxious. All other systems reviewed and are negative.       Past Medical History:   Diagnosis Date    Allergic rhinitis due to pollen     LAST ASSESSED [id number]    Anxiety     Depression     Has not taken medication for 6 months    Dysmenorrhea     LAST ASSESSED [id number]    Globus sensation     LAST ASSESSED [id number]    Wrist sprain     LAST ASSESSED [id number]     Past Surgical History:   Procedure Laterality Date    NO PAST SURGERIES       Family History   Problem Relation Age of Onset    Other Mother         IDIOPATHIC THROMBOCYTOPENIC PURPURA    Alcohol abuse Father     Anemia Father     No Known Problems Sister     No Known Problems Maternal Grandmother     Esophageal cancer Maternal Grandfather     Bone cancer Maternal Grandfather     Cancer Maternal Grandfather     No Known Problems Paternal Grandmother     Multiple sclerosis Paternal Grandfather      Social History     Socioeconomic History    Marital status: /Civil Union     Spouse name: None    Number of children: None    Years of education: None    Highest education level: None   Occupational History    None   Tobacco Use    Smoking status: Never    Smokeless tobacco: Never   Vaping Use    Vaping Use: Never used   Substance and Sexual Activity    Alcohol use: Not Currently     Alcohol/week: 1.0 standard drink of alcohol     Types: 1 Glasses of wine per week    Drug use: No    Sexual activity: Yes     Partners: Male   Other Topics Concern    None   Social History Narrative    CAFFEINE USE    DAILY COFFEE     Social Determinants of Health     Financial Resource Strain: Not on file   Food Insecurity: Not on file   Transportation Needs: Not on file   Physical Activity: Not on file   Stress: Not on file   Social Connections: Not on file   Intimate Partner Violence: Not on file   Housing Stability: Not on file     Current Outpatient Medications on File Prior to Visit   Medication Sig    Prenatal MV-Min-Fe Fum-FA-DHA (PRENATAL+DHA PO) Take by mouth    aspirin (ECOTRIN LOW STRENGTH) 81 mg EC tablet Take 2 tablets (162 mg total) by mouth daily     Allergies   Allergen Reactions    Fruit C [Ascorbate - Food Allergy] GI Intolerance     Fresh Fruit- stomach pains     Pollen Extract Allergic Rhinitis     Immunization History   Administered Date(s) Administered    DTaP 5 1995, 1995, 1995, 07/16/1998, 03/12/2002    HPV Quadrivalent 02/10/2014, 04/09/2014, 05/27/2014    Hep B, adult 1995, 1995, 1995, 03/15/2018    Hib (PRP-OMP) 1997, 1997, 1997, 07/16/1998    INFLUENZA 12/27/2017    IPV 1995, 1997, 06/19/1998, 03/12/2002    Influenza Quadrivalent Preservative Free 3 years and older IM 12/27/2017    MMR 06/19/1998, 03/12/2002    Meningococcal, Unknown Serogroups 09/09/2008    Tdap 09/09/2008, 12/27/2017, 10/11/2023    Varicella 07/16/1998, 09/09/2008       Objective     /74 (BP Location: Left arm, Patient Position: Sitting, Cuff Size: Adult)   Pulse 86   Temp (!) 95.9 °F (35.5 °C) (Tympanic)   Resp 18   Ht 5' 4" (1.626 m)   Wt 94.6 kg (208 lb 9.6 oz)   LMP 02/22/2023 (Exact Date)   SpO2 99%   BMI 35.81 kg/m²     Physical Exam  Vitals and nursing note reviewed. Constitutional:       Appearance: Normal appearance. HENT:      Head: Normocephalic and atraumatic. Right Ear: Tympanic membrane and external ear normal.      Left Ear: Tympanic membrane and external ear normal.      Nose: Nose normal.      Mouth/Throat:      Mouth: Mucous membranes are moist.   Eyes:      Extraocular Movements: Extraocular movements intact.       Conjunctiva/sclera: Conjunctivae normal.      Pupils: Pupils are equal, round, and reactive to light. Cardiovascular:      Rate and Rhythm: Normal rate and regular rhythm. Pulses: Normal pulses. Heart sounds: Normal heart sounds. Pulmonary:      Effort: Pulmonary effort is normal.      Breath sounds: Normal breath sounds. Abdominal:      General: Bowel sounds are normal.      Palpations: Abdomen is soft. Musculoskeletal:         General: Normal range of motion. Cervical back: Normal range of motion. Lymphadenopathy:      Cervical: No cervical adenopathy. Skin:     General: Skin is warm. Capillary Refill: Capillary refill takes less than 2 seconds. Neurological:      General: No focal deficit present. Mental Status: She is alert and oriented to person, place, and time.    Psychiatric:         Mood and Affect: Mood normal.         Behavior: Behavior normal.       Tirado Purchase, DO

## 2023-10-30 NOTE — ASSESSMENT & PLAN NOTE
- Stable. Increased Zoloft to 100 mg September. Does note some improvement. Still relates not 100% however after further discussion we will continue at 100 mg for now. -Advised to follow-up closely especially in the postpartum period to discuss increase as needed.  -Continue following with behavioral therapist as scheduled.  -Discussed mindfulness based stress reduction as additional treatment as well.  -Follow-up in 4 to 6 months as needed.

## 2023-11-06 ENCOUNTER — ROUTINE PRENATAL (OUTPATIENT)
Dept: OBGYN CLINIC | Facility: CLINIC | Age: 28
End: 2023-11-06
Payer: COMMERCIAL

## 2023-11-06 VITALS — WEIGHT: 211 LBS | SYSTOLIC BLOOD PRESSURE: 120 MMHG | BODY MASS INDEX: 36.22 KG/M2 | DIASTOLIC BLOOD PRESSURE: 66 MMHG

## 2023-11-06 DIAGNOSIS — Z3A.36 36 WEEKS GESTATION OF PREGNANCY: Primary | ICD-10-CM

## 2023-11-06 LAB
SL AMB  POCT GLUCOSE, UA: NEGATIVE
SL AMB POCT URINE PROTEIN: NEGATIVE

## 2023-11-06 PROCEDURE — PNV: Performed by: STUDENT IN AN ORGANIZED HEALTH CARE EDUCATION/TRAINING PROGRAM

## 2023-11-06 PROCEDURE — 87150 DNA/RNA AMPLIFIED PROBE: CPT | Performed by: STUDENT IN AN ORGANIZED HEALTH CARE EDUCATION/TRAINING PROGRAM

## 2023-11-06 PROCEDURE — 81002 URINALYSIS NONAUTO W/O SCOPE: CPT | Performed by: STUDENT IN AN ORGANIZED HEALTH CARE EDUCATION/TRAINING PROGRAM

## 2023-11-06 NOTE — PROGRESS NOTES
Pt is here for routine ob visit  No concerns at this time  Urine neg/neg   No LOF,VB,Contractions  +FM   UTD tdap  Declined flu shot   GBS collected today   Delivery consent signed at previous visit

## 2023-11-06 NOTE — PROGRESS NOTES
34 y.o.  at 36w5d, here for routine OB visit. Feeling well overall and without concerns. Good FM. Denies LOF, VB, contractions. Problem List Items Addressed This Visit          Other    36 weeks gestation of pregnancy - Primary     -precautions reviewed  -s/p Tdap  -declined flu previously  -delivery consent previously signed  -GBS collected  -interested in 39wk IOL.  Reviewed process today  -prepregnancy BMI 30 with goal weight gain 15-25#: TWG = 32#         Relevant Orders    POCT urine dip (Completed)    Strep B DNA probe, amplification

## 2023-11-06 NOTE — ASSESSMENT & PLAN NOTE
-precautions reviewed  -s/p Tdap  -declined flu previously  -delivery consent previously signed  -GBS collected  -interested in 39wk IOL.  Reviewed process today  -prepregnancy BMI 30 with goal weight gain 15-25#: TWG = 32#

## 2023-11-08 LAB — GP B STREP DNA SPEC QL NAA+PROBE: NEGATIVE

## 2023-11-10 ENCOUNTER — TELEPHONE (OUTPATIENT)
Age: 28
End: 2023-11-10

## 2023-11-10 DIAGNOSIS — F41.9 ANXIETY: Primary | ICD-10-CM

## 2023-11-10 RX ORDER — HYDROXYZINE HYDROCHLORIDE 25 MG/1
25 TABLET, FILM COATED ORAL
Qty: 10 TABLET | Refills: 0 | Status: SHIPPED | OUTPATIENT
Start: 2023-11-10

## 2023-11-10 NOTE — TELEPHONE ENCOUNTER
Called and reviewed anxiety with [place]. Discussed increasing Zoloft 250 mg p.o. daily for now. We will also provide hydroxyzine 25 mg to take before bedtime as needed. We discussed if continued feelings of increased heart rate and chest pressure may need to go to the emergency room if not subsiding.

## 2023-11-10 NOTE — TELEPHONE ENCOUNTER
[place] has been having what feels like an anxiety attack since last night. Patient is 37 weeks pregnant. She said another doctor used to prescribe her xanax to take as needed but she doesn't feel safe doing that while pregnant. I did offer the same day appt times Dr. Megan Marie does have today, pt preferred a msg to be sent in.  Please advise

## 2023-11-15 ENCOUNTER — ROUTINE PRENATAL (OUTPATIENT)
Dept: OBGYN CLINIC | Facility: CLINIC | Age: 28
End: 2023-11-15
Payer: COMMERCIAL

## 2023-11-15 ENCOUNTER — TELEPHONE (OUTPATIENT)
Dept: OBGYN CLINIC | Facility: CLINIC | Age: 28
End: 2023-11-15

## 2023-11-15 VITALS
HEART RATE: 90 BPM | WEIGHT: 211.4 LBS | SYSTOLIC BLOOD PRESSURE: 110 MMHG | BODY MASS INDEX: 36.29 KG/M2 | DIASTOLIC BLOOD PRESSURE: 70 MMHG | OXYGEN SATURATION: 99 %

## 2023-11-15 DIAGNOSIS — R00.2 PALPITATIONS: ICD-10-CM

## 2023-11-15 DIAGNOSIS — Z34.93 PRENATAL CARE IN THIRD TRIMESTER: Primary | ICD-10-CM

## 2023-11-15 DIAGNOSIS — Z3A.38 38 WEEKS GESTATION OF PREGNANCY: ICD-10-CM

## 2023-11-15 LAB
SL AMB  POCT GLUCOSE, UA: NORMAL
SL AMB POCT URINE PROTEIN: NORMAL

## 2023-11-15 PROCEDURE — PNV: Performed by: PHYSICIAN ASSISTANT

## 2023-11-15 PROCEDURE — 81002 URINALYSIS NONAUTO W/O SCOPE: CPT | Performed by: PHYSICIAN ASSISTANT

## 2023-11-15 NOTE — ASSESSMENT & PLAN NOTE
On arrival complained of feeling hot and nauseous in waiting room. Reports poor and disrupted sleep last night. Awoke and had a bowl of cereal shortly before arrival. No water yet today. Normotensive, pulse rate 90, O2 99. Heart RRR, no rub/gallop. Lungs CTA. Abdomen gravid, nontender. Symptoms improved shortly after sitting on exam room table and resolved by discharge. Cancelled cardiology appt scheduled yesterday and echo scheduled for today. Unable to provide reasoning for cancellation. Reviewed common CV changes that can arise in pregnancy and importance of f/u for evaluation and accurate dx. Encouraged to call cardiology to reschedule. Strict ER precautions given.

## 2023-11-15 NOTE — ASSESSMENT & PLAN NOTE
[place]  is a 34 y.o. Jonas Song @38w0d who presents for routine prenatal visit. S/p tdap. Flu declined   GBS - neg   Plans to breastfeed. Pump - has  Perineal massage - encouraged     Reports good fetal movement. Denies LOF, vaginal bleeding, regular uterine contractions, cramping, headaches or visual changes. Reviewed labor precautions and FKC.

## 2023-11-15 NOTE — PROGRESS NOTES
Patient here for prenatal visit. She states she feels hot and lightheaded; denies spotting, cramping or LOF. She said she did eat this morning. Good fetal movement. She desires cervical check. GBS neg. Tdap up to date. Urine neg for protein and glucose.

## 2023-11-15 NOTE — TELEPHONE ENCOUNTER
----- Message from Del Vázquez PA-C sent at 11/15/2023  1:02 PM EST -----  Regarding: IOL  Procedure to be scheduled (IOL or CS): IOL  JOEL: Estimated Date of Delivery: 11/29/23  Indication for delivery: elective, term  Requested date (s) of delivery: 11/29/23    If requested date is unavailable, is there a date by which the pt must be delivered?  41 weeks   Physician preference: n/a    If IOL, anticipated method: WITH ripening

## 2023-11-15 NOTE — PROGRESS NOTES
Problem List Items Addressed This Visit       38 weeks gestation of pregnancy     Chintan  is a 34 y.o. Louis Velez @38w0d who presents for routine prenatal visit. S/p tdap. Flu declined   GBS - neg   Plans to breastfeed. Pump - has  Perineal massage - encouraged     Reports good fetal movement. Denies LOF, vaginal bleeding, regular uterine contractions, cramping, headaches or visual changes. Reviewed labor precautions and FKC. Palpitations     On arrival complained of feeling hot and nauseous in waiting room. Reports poor and disrupted sleep last night. Awoke and had a bowl of cereal shortly before arrival. No water yet today. Normotensive, pulse rate 90, O2 99. Heart RRR, no rub/gallop. Lungs CTA. Abdomen gravid, nontender. Symptoms improved shortly after sitting on exam room table and resolved by discharge. Cancelled cardiology appt scheduled yesterday and echo scheduled for today. Unable to provide reasoning for cancellation. Reviewed common CV changes that can arise in pregnancy and importance of f/u for evaluation and accurate dx. Encouraged to call cardiology to reschedule. Strict ER precautions given.           Other Visit Diagnoses       Prenatal care in third trimester    -  Primary    Relevant Orders    POCT urine dip (Completed)

## 2023-11-20 ENCOUNTER — SOCIAL WORK (OUTPATIENT)
Dept: BEHAVIORAL/MENTAL HEALTH CLINIC | Facility: CLINIC | Age: 28
End: 2023-11-20
Payer: COMMERCIAL

## 2023-11-20 DIAGNOSIS — F32.0 MILD MAJOR DEPRESSION (HCC): ICD-10-CM

## 2023-11-20 DIAGNOSIS — F41.9 ANXIETY: Primary | ICD-10-CM

## 2023-11-20 PROCEDURE — 90834 PSYTX W PT 45 MINUTES: CPT | Performed by: COUNSELOR

## 2023-11-20 NOTE — PSYCH
Behavioral Health Psychotherapy Progress Note    Psychotherapy Provided: Individual Psychotherapy     1. Anxiety        2. Mild major depression ([address])            Goals addressed in session: Goal 1     DATA: Chintan arrived for her individual session today. She expressed that her anxiety has been difficult to manage lately. She expressed that she's been dealing with guilt and feels as though she wants to cut her sister out of her life. She discussed how during their thanksgiving dinner her sister didn't offer to bring anything and Chintan feels more stress from maintaining a relationship with her. She stated that she feels guilty for not having a conversation with her nieces and nephews. Chintan shared that she wants to put herself first and does try to do this a lot, but often times she wants to please others and wants to see them happy. We processed through her confidence in working on herself and how she is fixated on making others happy. Chintan shared about giving birth next week and how she has taken time off work to be able to continue working on herself. During this session, this clinician used the following therapeutic modalities: Client-centered Therapy, Cognitive Behavioral Therapy, and Supportive Psychotherapy    Substance Abuse was not addressed during this session. If the client is diagnosed with a co-occurring substance use disorder, please indicate any changes in the frequency or amount of use: n/a. Stage of change for addressing substance use diagnoses: No substance use/Not applicable    ASSESSMENT:  Phylicia Ramey presents with a Anxious mood. her affect is Normal range and intensity, which is congruent, with her mood and the content of the session. The client has not made progress on their goals. Chintan has been focusing on the things she can't control vs. The things she can.  Phylicia Ramey presents with a none risk of suicide, none risk of self-harm, and none risk of harm to others. For any risk assessment that surpasses a "low" rating, a safety plan must be developed. A safety plan was indicated: no  If yes, describe in detail n/a    PLAN: Between sessions, Lizette Garcia will continue practicing changing her thought patterns. At the next session, the therapist will use Client-centered Therapy, Cognitive Behavioral Therapy, and Supportive Psychotherapy to address her anxiety and depression. Behavioral Health Treatment Plan and Discharge Planning: Lizette Garcia is aware of and agrees to continue to work on their treatment plan. They have identified and are working toward their discharge goals.  yes    Visit start and stop times:    11/20/23  Start Time: 1504  Stop Time: 1550  Total Visit Time: 46 minutes

## 2023-11-22 ENCOUNTER — TELEPHONE (OUTPATIENT)
Dept: OBGYN CLINIC | Facility: CLINIC | Age: 28
End: 2023-11-22

## 2023-11-22 ENCOUNTER — PATIENT MESSAGE (OUTPATIENT)
Dept: OBGYN CLINIC | Facility: CLINIC | Age: 28
End: 2023-11-22

## 2023-11-22 NOTE — TELEPHONE ENCOUNTER
Chaka Sykes routed conversation to [address] [place] [address] Blvd minute ago (1:26 PM)     Delta Crenshaw2 minutes ago (1:25 PM)     AA  Patient calling because she needs to schedule her induction, [facility] told her to call the office.  Looking for a call back to do so

## 2023-11-22 NOTE — TELEPHONE ENCOUNTER
I cannot call today for induction of labor - too early. Can try Fri - but not sure if there will be time on Fri.  Pt aware Medication(s) requested: zolpidem 5 mg tablet   Last office visit: 08/30/2022  Next office visit: 03/02/2023  Last refill given: 09/22/2022  Last refill picked up from pharmacy: 09/22/2022  Contract present: no  Date of contract: no  Last urine drug screen: No    Is the patient due for refill of this medication(s): Yes  PDMP review: Criteria met. Forwarded to Physician/ESTEABN for signature.         Attending Attestation (For Attendings USE Only)...

## 2023-11-22 NOTE — TELEPHONE ENCOUNTER
Patient calling because she needs to schedule her induction, [facility] told her to call the office. Looking for a call back to do so

## 2023-11-22 NOTE — PROGRESS NOTES
Prenatal Visit   39w2d  11/30/23 - wants to know if there is a waitlist for sooner date    PN1 completed  Nuchal completed   AFP completed  Level 2 completed  28 week labs completed   Consents signed  Breast pump ordered   Tdap UTD  Flu vaccine declines     Denies LOF, VB, or CTX.   Has cramping  Pt has +FM  Patients urine is   Patient has no concerns today

## 2023-11-24 ENCOUNTER — ROUTINE PRENATAL (OUTPATIENT)
Dept: OBGYN CLINIC | Facility: CLINIC | Age: 28
End: 2023-11-24
Payer: COMMERCIAL

## 2023-11-24 VITALS — DIASTOLIC BLOOD PRESSURE: 58 MMHG | BODY MASS INDEX: 36.05 KG/M2 | SYSTOLIC BLOOD PRESSURE: 96 MMHG | WEIGHT: 210 LBS

## 2023-11-24 DIAGNOSIS — O44.40 LOW-LYING PLACENTA: ICD-10-CM

## 2023-11-24 DIAGNOSIS — Z34.93 PRENATAL CARE IN THIRD TRIMESTER: Primary | ICD-10-CM

## 2023-11-24 DIAGNOSIS — F41.8 DEPRESSION WITH ANXIETY: ICD-10-CM

## 2023-11-24 DIAGNOSIS — Z3A.39 39 WEEKS GESTATION OF PREGNANCY: ICD-10-CM

## 2023-11-24 LAB
SL AMB  POCT GLUCOSE, UA: NORMAL
SL AMB POCT URINE PROTEIN: NORMAL

## 2023-11-24 PROCEDURE — PNV: Performed by: PHYSICIAN ASSISTANT

## 2023-11-24 PROCEDURE — 81002 URINALYSIS NONAUTO W/O SCOPE: CPT | Performed by: PHYSICIAN ASSISTANT

## 2023-11-24 NOTE — ASSESSMENT & PLAN NOTE
Zoloft adjusted to 150 mg QD by PCP a few weeks ago.  Feeling okay on this dose presently  Taking atarax PRN

## 2023-11-24 NOTE — PROGRESS NOTES
39 weeks gestation of pregnancy  Chintan  is a 34 y.o. Mendel Dodge @39w2d who presents for routine prenatal visit. IOL scheduled for 11/30/23 into 12/1/23. Cervix long, closed, posterior today. Keep IOL with ripening    Growth scan- EFW58% at 30 wks; resolved low lying placenta  TDAP up to date  Flu vaccine- declined; counseling provided previously  GBS negative  Delivery plan submitted previously; consents signed previously     Reports good fetal movement. Denies LOF, vaginal bleeding, regular uterine contractions, cramping, headaches or visual changes. Reviewed PTL/Labor precautions and FKC. Depression with anxiety  Zoloft adjusted to 150 mg QD by PCP a few weeks ago.  Feeling okay on this dose presently  Taking atarax PRN    Low-lying placenta  RESOLVED on 30 wk US

## 2023-11-24 NOTE — TELEPHONE ENCOUNTER
Pt scheduled for eiol on the 30th of Nov into the 1st of Dec. Dr. HART is on  call in evening and KTM , Fri, during day. KTM notified.  Pt aware

## 2023-11-25 ENCOUNTER — NURSE TRIAGE (OUTPATIENT)
Dept: OTHER | Facility: OTHER | Age: 28
End: 2023-11-25

## 2023-11-25 ENCOUNTER — ANESTHESIA EVENT (INPATIENT)
Dept: ANESTHESIOLOGY | Facility: HOSPITAL | Age: 28
End: 2023-11-25
Payer: COMMERCIAL

## 2023-11-25 ENCOUNTER — HOSPITAL ENCOUNTER (INPATIENT)
Facility: HOSPITAL | Age: 28
LOS: 2 days | Discharge: HOME | End: 2023-11-27
Attending: OBSTETRICS & GYNECOLOGY | Admitting: OBSTETRICS & GYNECOLOGY
Payer: COMMERCIAL

## 2023-11-25 ENCOUNTER — ANESTHESIA (INPATIENT)
Dept: ANESTHESIOLOGY | Facility: HOSPITAL | Age: 28
End: 2023-11-25
Payer: COMMERCIAL

## 2023-11-25 DIAGNOSIS — Z37.9 NORMAL LABOR: ICD-10-CM

## 2023-11-25 DIAGNOSIS — F41.8 DEPRESSION WITH ANXIETY: ICD-10-CM

## 2023-11-25 LAB
ABO GROUP BLD: NORMAL
BLD GP AB SCN SERPL QL: NEGATIVE
ERYTHROCYTE [DISTWIDTH] IN BLOOD BY AUTOMATED COUNT: 14.3 % (ref 11.6–15.1)
HCT VFR BLD AUTO: 36.4 % (ref 34.8–46.1)
HGB BLD-MCNC: 11.6 G/DL (ref 11.5–15.4)
HOLD SPECIMEN: NORMAL
MCH RBC QN AUTO: 25.9 PG (ref 26.8–34.3)
MCHC RBC AUTO-ENTMCNC: 31.9 G/DL (ref 31.4–37.4)
MCV RBC AUTO: 81 FL (ref 82–98)
PLATELET # BLD AUTO: 247 THOUSANDS/UL (ref 149–390)
PMV BLD AUTO: 9.5 FL (ref 8.9–12.7)
RBC # BLD AUTO: 4.48 MILLION/UL (ref 3.81–5.12)
RH BLD: POSITIVE
SPECIMEN EXPIRATION DATE: NORMAL
WBC # BLD AUTO: 11 THOUSAND/UL (ref 4.31–10.16)

## 2023-11-25 PROCEDURE — 86901 BLOOD TYPING SEROLOGIC RH(D): CPT

## 2023-11-25 PROCEDURE — 86900 BLOOD TYPING SEROLOGIC ABO: CPT

## 2023-11-25 PROCEDURE — 85027 COMPLETE CBC AUTOMATED: CPT

## 2023-11-25 PROCEDURE — 86780 TREPONEMA PALLIDUM: CPT

## 2023-11-25 PROCEDURE — 99214 OFFICE O/P EST MOD 30 MIN: CPT

## 2023-11-25 PROCEDURE — 86850 RBC ANTIBODY SCREEN: CPT

## 2023-11-25 PROCEDURE — NC001 PR NO CHARGE: Performed by: OBSTETRICS & GYNECOLOGY

## 2023-11-25 PROCEDURE — 4A1HXCZ MONITORING OF PRODUCTS OF CONCEPTION, CARDIAC RATE, EXTERNAL APPROACH: ICD-10-PCS | Performed by: STUDENT IN AN ORGANIZED HEALTH CARE EDUCATION/TRAINING PROGRAM

## 2023-11-25 RX ORDER — LIDOCAINE HYDROCHLORIDE AND EPINEPHRINE 15; 5 MG/ML; UG/ML
INJECTION, SOLUTION EPIDURAL AS NEEDED
Status: DISCONTINUED | OUTPATIENT
Start: 2023-11-25 | End: 2023-11-26 | Stop reason: HOSPADM

## 2023-11-25 RX ORDER — ONDANSETRON 2 MG/ML
4 INJECTION INTRAMUSCULAR; INTRAVENOUS EVERY 6 HOURS PRN
Status: DISCONTINUED | OUTPATIENT
Start: 2023-11-25 | End: 2023-11-26

## 2023-11-25 RX ORDER — BUPIVACAINE HYDROCHLORIDE 2.5 MG/ML
30 INJECTION, SOLUTION EPIDURAL; INFILTRATION; INTRACAUDAL ONCE AS NEEDED
Status: DISCONTINUED | OUTPATIENT
Start: 2023-11-25 | End: 2023-11-26

## 2023-11-25 RX ORDER — SODIUM CHLORIDE, SODIUM LACTATE, POTASSIUM CHLORIDE, CALCIUM CHLORIDE 600; 310; 30; 20 MG/100ML; MG/100ML; MG/100ML; MG/100ML
125 INJECTION, SOLUTION INTRAVENOUS CONTINUOUS
Status: DISCONTINUED | OUTPATIENT
Start: 2023-11-25 | End: 2023-11-26

## 2023-11-25 RX ADMIN — LIDOCAINE HYDROCHLORIDE AND EPINEPHRINE 3 ML: 15; 5 INJECTION, SOLUTION EPIDURAL at 16:00

## 2023-11-25 RX ADMIN — ROPIVACAINE HYDROCHLORIDE: 2 INJECTION, SOLUTION EPIDURAL; INFILTRATION at 16:57

## 2023-11-25 RX ADMIN — SODIUM CHLORIDE, SODIUM LACTATE, POTASSIUM CHLORIDE, AND CALCIUM CHLORIDE 125 ML/HR: .6; .31; .03; .02 INJECTION, SOLUTION INTRAVENOUS at 16:56

## 2023-11-25 RX ADMIN — SODIUM CHLORIDE, SODIUM LACTATE, POTASSIUM CHLORIDE, AND CALCIUM CHLORIDE 125 ML/HR: .6; .31; .03; .02 INJECTION, SOLUTION INTRAVENOUS at 20:13

## 2023-11-25 NOTE — TELEPHONE ENCOUNTER
Reason for Disposition  • MODERATE-SEVERE abdominal pain (e.g., interferes with normal activities, awakens from sleep)    Answer Assessment - Initial Assessment Questions  1. LOCATION: "Where does it hurt?"       Abdomen    2. RADIATION: "Does the pain shoot anywhere else?" (e.g., chest, back)      Lower back    3. ONSET: "When did the pain begin?" (Minutes, hours or days ago)       Pain since last night    4. ONSET: "Gradual or sudden onset?"      Gradual    5. PATTERN: "Does the pain come and go, or has it been constant since it started?"       3 mins apart and lasting almost 1 min    6. SEVERITY: "How bad is the pain?" "What does it keep you from doing?"  (e.g., Scale 1-10; mild, moderate, or severe)    - MILD (1-3): doesn't interfere with normal activities, abdomen soft and not tender to touch     - MODERATE (4-7): interferes with normal activities or awakens from sleep, tender to touch     - SEVERE (8-10): excruciating pain, doubled over, unable to do any normal activities      8/10    7. RECURRENT SYMPTOM: "Have you ever had this type of stomach pain before?" If Yes, ask: "When was the last time?" and "What happened that time?"       First time feeling pain in this pregnancy    8. CAUSE: "What do you think is causing the stomach pain? Labor    9. RELIEVING/AGGRAVATING FACTORS: "What makes it better or worse?" (e.g., antacids, bowel movement, movement)      Nothing    10. FETAL MOVEMENT: "Has the baby's movement decreased or changed significantly from normal?"        Hasn't felt movement all day- also really focused on movement. 11. OTHER SYMPTOMS: "Has there been any vaginal bleeding, fever, vomiting, diarrhea, or urine problems?"        Some pink colored vaginal discharge. 12.  JOEL: "What date are you expecting to deliver?"        11/29/2024    Protocols used: Pregnancy - Abdominal Pain Greater Than 20 Weeks EGA-ADULT-

## 2023-11-25 NOTE — ASSESSMENT & PLAN NOTE
Admit to OB/GYN service  Follow up CBC, RPR, Blood Type  EFW: 58th%  VTX by transabdominal ultrasound   GBS neg status  Analgesia and/or epidural at patient request  Anticipate

## 2023-11-25 NOTE — TELEPHONE ENCOUNTER
Regarding: labor contractions  ----- Message from Maura Gaspar sent at 11/25/2023  1:18 PM EST -----  "My wife has been having contractions since last night.  They are lasting 50 seconds and are about 3 minutes apart."

## 2023-11-25 NOTE — H&P
[address] [facility] 34 y.o. female MRN: [id number]  Unit/Bed#:  201-01 Encounter: [id number]      Assessment/Plan:    Bettie Latham is a 34 y.o.  at 39w3d admitted for labor    SVE: Cervical Dilation: 3  Cervical Effacement: 60  Cervical Consistency: Soft  Fetal Station: -2  Presentation: Vertex  Position: Unknown  Method: Manual  OB Examiner: Jaleesa    Normal labor  Assessment & Plan  Admit to OB/GYN service  Follow up CBC, RPR, Blood Type  EFW: 58th%  VTX by transabdominal ultrasound   GBS neg status  Analgesia and/or epidural at patient request  Anticipate                  Patient of: [address]  This patient will be an INPATIENT  and I certify the anticipated length of stay is >2 Midnights  Discussed with Dr. Aguilar Bolus:    Chief Complaint: Contractions every 2-3 minutes since 0430    HPI: Arabella Pearson is a 34 y.o. [place] with an JOEL of 2023, by Last Menstrual Period at 39w3d who is being admitted for labor . She complains of uterine contractions, occurring every 2 minutes, has no LOF, and reports no VB. She states she has felt good FM. Suhas Spine This pregnancy is complicated by depression, anxiety, resolved low lying placenta. All other review of systems is negative. Pregnancy Plan:  Pregnancy: Emely Joyce  Fetal sex: Female  Support person: Willy     Delivery Plans  Planned delivery method: Vaginal  Planned delivery location: [facility] L[facility]  Planned anesthesia: Epidural  Acceptable blood products:  All     Post-Delivery Plans  Feeding intentions: Breast Milk  Cord blood plans: Do Not Collect  Planned adoption: Not Applicable      Patient Active Problem List   Diagnosis    Depression with anxiety    Insomnia    Anxiety    Family history of primary ovarian failure    Mild major depression ([address])    39 weeks gestation of pregnancy    Low-lying placenta    Palpitations    Normal labor       OB History    Para Term  AB Living   1 0 0 0 0 0   SAB IAB Ectopic Multiple Live Births   0 0 0 0 0      # Outcome Date GA Lbr Eros/2nd Weight Sex Delivery Anes PTL Lv   1 Current                Past Medical History:   Diagnosis Date    Allergic rhinitis due to pollen     LAST ASSESSED [id number]    Anxiety     Depression     Has not taken medication for 6 months    Dysmenorrhea     LAST ASSESSED [id number]    Globus sensation     LAST ASSESSED [id number]    Varicella     Wrist sprain     LAST ASSESSED [id number]       Past Surgical History:   Procedure Laterality Date    NO PAST SURGERIES         Social History     Tobacco Use    Smoking status: Never    Smokeless tobacco: Never   Substance Use Topics    Alcohol use: Not Currently     Alcohol/week: 1.0 standard drink of alcohol     Types: 1 Glasses of wine per week       Allergies   Allergen Reactions    Fruit C [Ascorbate - Food Allergy] GI Intolerance     Fresh Fruit- stomach pains     Pollen Extract Allergic Rhinitis       Medications Prior to Admission   Medication    hydrOXYzine HCL (ATARAX) 25 mg tablet    Prenatal MV-Min-Fe Fum-FA-DHA (PRENATAL+DHA PO)    sertraline (ZOLOFT) 100 mg tablet           OBJECTIVE:  Vitals:  Temp:  [98.2 °F (36.8 °C)] 98.2 °F (36.8 °C)  HR:  [63-83] 63  Resp:  [20] 20  BP: (113-153)/(57-86) 127/65  Body mass index is 34.95 kg/m². Physical Exam:  General: Well appearing, no distress  Respiratory: Unlabored breathing  Cardiovascular: Regular rate. Abdomen: Soft, gravid, nontender  Fundal Height: Appropriate for gestational age. Extremities: Warm and well perfused. Non tender.   Psychiatric: Behavioral normal        FHT:  Baseline Rate: 140 bpm  Variability: Moderate 6-25 bpm  Accelerations: 15 x 15 or greater  Decelerations: None  FHR Category: Category I    TOCO:   Contraction Frequency (minutes): 2-3.5  Contraction Duration (seconds): 70-80  Contraction Quality: Moderate      Prenatal Labs: Blood Type:   Lab Results   Component Value Date/Time    ABO Grouping A 11/25/2023 03:28 PM     , D (Rh type):   Lab Results   Component Value Date/Time    Rh Factor Positive 11/25/2023 03:28 PM     , Antibody Screen: No results found for: "ANTIBODYSCR" , HCT/HGB:   Lab Results   Component Value Date/Time    Hematocrit 36.4 11/25/2023 03:28 PM    Hemoglobin 11.6 11/25/2023 03:28 PM      , MCV:   Lab Results   Component Value Date/Time    MCV 81 (L) 11/25/2023 03:28 PM      , 1 hour Glucola:   Lab Results   Component Value Date/Time    Glucose 122 08/31/2023 10:53 AM   , Varicella: No results found for: "VARICELLAIGG"    , Rubella:   Lab Results   Component Value Date/Time    Rubella IgG Quant 33.4 05/10/2023 04:53 PM        , VDRL/RPR: No results found for: "RPR"   , Urine Culture/Screen:   Lab Results   Component Value Date/Time    Urine Culture 50,000-59,000 cfu/ml 05/10/2023 04:53 PM       , Hep B:   Lab Results   Component Value Date/Time    Hepatitis B Surface Ag Non-reactive 05/10/2023 04:53 PM     , Hep C: No components found for: "HEPCSAG", "EXTHEPCSAG"   , HIV: No results found for: "HIVAGAB"  , Chlamydia: No results found for: "EXTCHLAMYDIA"  , Gonorrhea:   Lab Results   Component Value Date/Time    N gonorrhoeae, DNA Probe Negative 05/17/2023 05:06 PM    N gonorrhoeae, DNA Probe N. gonorrhoeae Amplified DNA Negative 04/16/2018 02:16 PM     , Group B Strep:    Lab Results   Component Value Date/Time    Strep Grp B PCR Negative 11/06/2023 03:37 PM      ,       Severiano Gant,   11/25/2023  5:43 PM        Portions of the record may have been created with voice recognition software. Occasional wrong word or "sound a like" substitutions may have occurred due to the inherent limitations of voice recognition software.   Read the chart carefully and recognize, using context, where substitutions have occurred

## 2023-11-25 NOTE — ANESTHESIA PREPROCEDURE EVALUATION
Procedure:  LABOR ANALGESIA    Relevant Problems   GYN   (+) 39 weeks gestation of pregnancy      NEURO/PSYCH   (+) Anxiety   (+) Depression with anxiety   (+) Mild major depression ([address])        Physical Exam    Airway       Dental       Cardiovascular      Pulmonary      Other Findings  post-pubertal.      Anesthesia Plan  ASA Score- 2     Anesthesia Type- epidural with ASA Monitors. Additional Monitors:     Airway Plan:     Comment: Discussed the risks/benefits of neuraxial anesthesia, including risk of bleeding/infection/damage to underlying structures, the likely side effect of nausea, and unlikely complication of spinal headache. .       Plan Factors-Exercise tolerance (METS): >4 METS. Chart reviewed. Existing labs reviewed. Patient summary reviewed. Patient is not a current smoker. Patient instructed to abstain from smoking on day of procedure. Patient did not smoke on day of surgery. Induction-     Postoperative Plan-     Informed Consent- Anesthetic plan and risks discussed with patient. I personally reviewed this patient with the CRNA. Discussed and agreed on the Anesthesia Plan with the CRNA. Marquise Brenner

## 2023-11-25 NOTE — PLAN OF CARE
Problem: BIRTH - VAGINAL/ SECTION  Goal: Fetal and maternal status remain reassuring during the birth process  Description: INTERVENTIONS:  - Monitor vital signs  - Monitor fetal heart rate  - Monitor uterine activity  - Monitor labor progression (vaginal delivery)  - DVT prophylaxis  - Antibiotic prophylaxis  Outcome: Progressing  Goal: Emotionally satisfying birthing experience for mother/fetus  Description: Interventions:  - Assess, plan, implement and evaluate the nursing care given to the patient in labor  - Advocate the philosophy that each childbirth experience is a unique experience and support the family's chosen level of involvement and control during the labor process   - Actively participate in both the patient's and family's teaching of the birth process  - Consider cultural, [employer] and age-specific factors and plan care for the patient in labor  Outcome: Progressing     Problem: Knowledge Deficit  Goal: Verbalizes understanding of labor plan  Description: Assess patient/family/caregiver's baseline knowledge level and ability to understand information. Provide education via patient/family/caregiver's preferred learning method at appropriate level of understanding. 1. Provide teaching at level of understanding. 2. Provide teaching via preferred learning method(s). Outcome: Progressing  Goal: Patient/family/caregiver demonstrates understanding of disease process, treatment plan, medications, and discharge instructions  Description: Complete learning assessment and assess knowledge base. Interventions:  - Provide teaching at level of understanding  - Provide teaching via preferred learning methods  Outcome: Progressing     Problem: Labor & Delivery  Goal: Manages discomfort  Description: Assess and monitor for signs and symptoms of discomfort. Assess patient's pain level regularly and per hospital policy. Administer medications as ordered.  Support use of nonpharmacological methods to help control pain such as distraction, imagery, relaxation, and application of heat and cold. Collaborate with interdisciplinary team and patient to determine appropriate pain management plan. 1. Include patient in decisions related to comfort. 2. Offer non-pharmacological pain management interventions. 3. Report ineffective pain management to physician. Outcome: Progressing  Goal: Patient vital signs are stable  Description: 1. Assess vital signs - vaginal delivery.   Outcome: Progressing     Problem: PAIN - ADULT  Goal: Verbalizes/displays adequate comfort level or baseline comfort level  Description: Interventions:  - Encourage patient to monitor pain and request assistance  - Assess pain using appropriate pain scale  - Administer analgesics based on type and severity of pain and evaluate response  - Implement non-pharmacological measures as appropriate and evaluate response  - Consider cultural and social influences on pain and pain management  - Notify physician/advanced practitioner if interventions unsuccessful or patient reports new pain  Outcome: Progressing     Problem: INFECTION - ADULT  Goal: Absence or prevention of progression during hospitalization  Description: INTERVENTIONS:  - Assess and monitor for signs and symptoms of infection  - Monitor lab/diagnostic results  - Monitor all insertion sites, i.e. indwelling lines, tubes, and drains  - Monitor endotracheal if appropriate and nasal secretions for changes in amount and color  - [place] appropriate cooling/warming therapies per order  - Administer medications as ordered  - Instruct and encourage patient and family to use good hand hygiene technique  - Identify and instruct in appropriate isolation precautions for identified infection/condition  Outcome: Progressing  Goal: Absence of fever/infection during neutropenic period  Description: INTERVENTIONS:  - Monitor WBC    Outcome: Progressing     Problem: SAFETY ADULT  Goal: Patient will remain free of falls  Description: INTERVENTIONS:  - Educate patient/family on patient safety including physical limitations  - Instruct patient to call for assistance with activity   - Consult OT/PT to assist with strengthening/mobility   - Keep Call bell within reach  - Keep bed low and locked with side rails adjusted as appropriate  - Keep care items and personal belongings within reach  - Initiate and maintain comfort rounds  - Make Fall Risk Sign visible to staff  - Apply yellow socks and bracelet for high fall risk patients  - Consider moving patient to room near nurses station  Outcome: Progressing  Goal: Maintain or return to baseline ADL function  Description: INTERVENTIONS:  -  Assess patient's ability to carry out ADLs; assess patient's baseline for ADL function and identify physical deficits which impact ability to perform ADLs (bathing, care of mouth/teeth, toileting, grooming, dressing, etc.)  - Assess/evaluate cause of self-care deficits   - Assess range of motion  - Assess patient's mobility; develop plan if impaired  - Assess patient's need for assistive devices and provide as appropriate  - Encourage maximum independence but intervene and supervise when necessary  - Involve family in performance of ADLs  - Assess for home care needs following discharge   - Consider OT consult to assist with ADL evaluation and planning for discharge  - Provide patient education as appropriate  Outcome: Progressing  Goal: Maintains/Returns to pre admission functional level  Description: INTERVENTIONS:  - Perform AM-PAC 6 Click Basic Mobility/ Daily Activity assessment daily.  - Set and communicate daily mobility goal to care team and patient/family/caregiver.    - Collaborate with rehabilitation services on mobility goals if consulted  - Out of bed for toileting  - Record patient progress and toleration of activity level   Outcome: Progressing     Problem: DISCHARGE PLANNING  Goal: Discharge to home or other facility with appropriate resources  Description: INTERVENTIONS:  - Identify barriers to discharge w/patient and caregiver  - Arrange for needed discharge resources and transportation as appropriate  - Identify discharge learning needs (meds, wound care, etc.)  - Arrange for interpretive services to assist at discharge as needed  - Refer to Case Management Department for coordinating discharge planning if the patient needs post-hospital services based on physician/advanced practitioner order or complex needs related to functional status, cognitive ability, or social support system  Outcome: Progressing

## 2023-11-25 NOTE — ANESTHESIA PROCEDURE NOTES
Epidural Block    Start time: 11/25/2023 3:58 PM  Reason for block: procedure for pain  Staffing  Performed by: Tila Correa CRNA  Authorized by: Fabian Ramos MD    Preanesthetic Checklist  Completed: patient identified, IV checked, site marked, risks and benefits discussed, surgical consent, monitors and equipment checked, pre-op evaluation and timeout performed  Epidural  Patient position: sitting  Prep: ChloraPrep  Sedation Level: no sedation  Patient monitoring: frequent blood pressure checks, continuous pulse oximetry and heart rate  Approach: midline  Location: lumbar, L3-4  Injection technique: QUENTIN saline  Needle  Needle type: Tuohy   Needle gauge: 18 G  Needle insertion depth: 6 cm  Catheter type: multi-orifice  Catheter size: 20 G  Catheter at skin depth: 11 cm  Catheter securement method: stabilization device and clear occlusive dressing  Test dose: negative  Assessment  Sensory level: T10  Number of attempts: 1negative aspiration for CSF, negative aspiration for heme and no paresthesia on injection  patient tolerated the procedure well with no immediate complications  Additional Notes  Unremarkable epidural

## 2023-11-26 LAB
BASE EXCESS BLDCOA CALC-SCNC: -12.1 MMOL/L (ref 3–11)
BASE EXCESS BLDCOV CALC-SCNC: -8.3 MMOL/L (ref 1–9)
HCO3 BLDCOA-SCNC: 18.2 MMOL/L (ref 17.3–27.3)
HCO3 BLDCOV-SCNC: 18 MMOL/L (ref 12.2–28.6)
O2 CT VFR BLDCOA CALC: 11.4 ML/DL
OXYHGB MFR BLDCOA: 49.1 %
OXYHGB MFR BLDCOV: 66.8 %
PCO2 BLDCOA: 58.9 MM[HG] (ref 30–60)
PCO2 BLDCOV: 40 MM HG (ref 27–43)
PH BLDCOA: 7.11 [PH] (ref 7.23–7.43)
PH BLDCOV: 7.27 [PH] (ref 7.19–7.49)
PO2 BLDCOA: 27.3 MM HG (ref 5–25)
PO2 BLDCOV: 31.7 MM HG (ref 15–45)
SAO2 % BLDCOV: 15.7 ML/DL

## 2023-11-26 PROCEDURE — 82805 BLOOD GASES W/O2 SATURATION: CPT | Performed by: OBSTETRICS & GYNECOLOGY

## 2023-11-26 PROCEDURE — 10907ZC DRAINAGE OF AMNIOTIC FLUID, THERAPEUTIC FROM PRODUCTS OF CONCEPTION, VIA NATURAL OR ARTIFICIAL OPENING: ICD-10-PCS | Performed by: STUDENT IN AN ORGANIZED HEALTH CARE EDUCATION/TRAINING PROGRAM

## 2023-11-26 RX ORDER — SIMETHICONE 80 MG
80 TABLET,CHEWABLE ORAL 4 TIMES DAILY PRN
Status: DISCONTINUED | OUTPATIENT
Start: 2023-11-26 | End: 2023-11-27 | Stop reason: HOSPADM

## 2023-11-26 RX ORDER — ACETAMINOPHEN 325 MG/1
975 TABLET ORAL EVERY 6 HOURS PRN
Status: DISCONTINUED | OUTPATIENT
Start: 2023-11-26 | End: 2023-11-27 | Stop reason: HOSPADM

## 2023-11-26 RX ORDER — FAMOTIDINE 20 MG/1
20 TABLET, FILM COATED ORAL 2 TIMES DAILY
Status: DISCONTINUED | OUTPATIENT
Start: 2023-11-26 | End: 2023-11-27 | Stop reason: HOSPADM

## 2023-11-26 RX ORDER — CALCIUM CARBONATE 500 MG/1
500 TABLET, CHEWABLE ORAL DAILY PRN
Status: DISCONTINUED | OUTPATIENT
Start: 2023-11-26 | End: 2023-11-27 | Stop reason: HOSPADM

## 2023-11-26 RX ORDER — OXYTOCIN/RINGER'S LACTATE 30/500 ML
PLASTIC BAG, INJECTION (ML) INTRAVENOUS
Status: DISPENSED
Start: 2023-11-26 | End: 2023-11-26

## 2023-11-26 RX ORDER — OXYTOCIN/RINGER'S LACTATE 30/500 ML
1-30 PLASTIC BAG, INJECTION (ML) INTRAVENOUS
Status: DISCONTINUED | OUTPATIENT
Start: 2023-11-26 | End: 2023-11-26

## 2023-11-26 RX ORDER — OXYTOCIN/RINGER'S LACTATE 30/500 ML
250 PLASTIC BAG, INJECTION (ML) INTRAVENOUS ONCE
Status: DISCONTINUED | OUTPATIENT
Start: 2023-11-26 | End: 2023-11-27 | Stop reason: HOSPADM

## 2023-11-26 RX ORDER — DIAPER,BRIEF,INFANT-TODD,DISP
1 EACH MISCELLANEOUS DAILY PRN
Status: DISCONTINUED | OUTPATIENT
Start: 2023-11-26 | End: 2023-11-27 | Stop reason: HOSPADM

## 2023-11-26 RX ORDER — DOCUSATE SODIUM 100 MG/1
100 CAPSULE, LIQUID FILLED ORAL 2 TIMES DAILY
Status: DISCONTINUED | OUTPATIENT
Start: 2023-11-27 | End: 2023-11-27 | Stop reason: HOSPADM

## 2023-11-26 RX ORDER — IBUPROFEN 600 MG/1
600 TABLET ORAL EVERY 6 HOURS
Status: DISCONTINUED | OUTPATIENT
Start: 2023-11-26 | End: 2023-11-27 | Stop reason: HOSPADM

## 2023-11-26 RX ORDER — FAMOTIDINE 20 MG/1
20 TABLET, FILM COATED ORAL 2 TIMES DAILY
Status: DISCONTINUED | OUTPATIENT
Start: 2023-11-26 | End: 2023-11-26

## 2023-11-26 RX ORDER — HYDROXYZINE HYDROCHLORIDE 25 MG/1
25 TABLET, FILM COATED ORAL
Status: DISCONTINUED | OUTPATIENT
Start: 2023-11-26 | End: 2023-11-27 | Stop reason: HOSPADM

## 2023-11-26 RX ORDER — ONDANSETRON 2 MG/ML
4 INJECTION INTRAMUSCULAR; INTRAVENOUS EVERY 8 HOURS PRN
Status: DISCONTINUED | OUTPATIENT
Start: 2023-11-26 | End: 2023-11-27 | Stop reason: HOSPADM

## 2023-11-26 RX ADMIN — SODIUM CHLORIDE, SODIUM LACTATE, POTASSIUM CHLORIDE, AND CALCIUM CHLORIDE 125 ML/HR: .6; .31; .03; .02 INJECTION, SOLUTION INTRAVENOUS at 03:56

## 2023-11-26 RX ADMIN — FAMOTIDINE 20 MG: 20 TABLET ORAL at 17:47

## 2023-11-26 RX ADMIN — SERTRALINE HYDROCHLORIDE 150 MG: 50 TABLET ORAL at 21:11

## 2023-11-26 RX ADMIN — ACETAMINOPHEN 975 MG: 325 TABLET, FILM COATED ORAL at 17:47

## 2023-11-26 RX ADMIN — WITCH HAZEL 1 PAD: 500 SOLUTION RECTAL; TOPICAL at 15:02

## 2023-11-26 RX ADMIN — ACETAMINOPHEN 975 MG: 325 TABLET, FILM COATED ORAL at 03:47

## 2023-11-26 RX ADMIN — IBUPROFEN 600 MG: 600 TABLET, FILM COATED ORAL at 13:54

## 2023-11-26 RX ADMIN — ONDANSETRON 4 MG: 2 INJECTION INTRAMUSCULAR; INTRAVENOUS at 11:32

## 2023-11-26 RX ADMIN — BENZOCAINE AND LEVOMENTHOL 1 APPLICATION: 200; 5 SPRAY TOPICAL at 15:02

## 2023-11-26 RX ADMIN — CALCIUM CARBONATE (ANTACID) CHEW TAB 500 MG 500 MG: 500 CHEW TAB at 03:17

## 2023-11-26 RX ADMIN — FAMOTIDINE 20 MG: 20 TABLET ORAL at 06:04

## 2023-11-26 RX ADMIN — ROPIVACAINE HYDROCHLORIDE: 2 INJECTION, SOLUTION EPIDURAL; INFILTRATION at 07:46

## 2023-11-26 RX ADMIN — ROPIVACAINE HYDROCHLORIDE: 2 INJECTION, SOLUTION EPIDURAL; INFILTRATION at 01:09

## 2023-11-26 RX ADMIN — Medication 2 MILLI-UNITS/MIN: at 06:16

## 2023-11-26 RX ADMIN — IBUPROFEN 600 MG: 600 TABLET, FILM COATED ORAL at 19:45

## 2023-11-26 RX ADMIN — SODIUM CHLORIDE, SODIUM LACTATE, POTASSIUM CHLORIDE, AND CALCIUM CHLORIDE 999 ML/HR: .6; .31; .03; .02 INJECTION, SOLUTION INTRAVENOUS at 08:57

## 2023-11-26 RX ADMIN — HYDROCORTISONE 1 APPLICATION: 1 CREAM TOPICAL at 15:02

## 2023-11-26 NOTE — OB LABOR/OXYTOCIN SAFETY PROGRESS
Oxytocin Safety Progress Check Note - Ajay Helton 34 y.o. female MRN: [id number]    Unit/Bed#: -01 Encounter: [id number]    Dose (tiesha-units/min) Oxytocin: 8 tiesha-units/min  Contraction Frequency (minutes): 4-5  Contraction Quality: Moderate  Tachysystole: No   Cervical Dilation: 6        Cervical Effacement: 80  Fetal Station: 0  Baseline Rate: 135 bpm  FHR Category: Fetal heart tracing intermittently category 2 from periodic and not recurrent variable decelerations-there is overall moderate variability and 15 x 15 spontaneous accelerations and with exam this morning. Oxytocin Safety Progress Check: Safety check completed     Vital Signs:   Vitals:    11/26/23 0637   BP:    Pulse:    Resp:    Temp: 98.2 °F (36.8 °C)   SpO2:        Notes/comments:   I have resumed care for Chintan this morning-she is a 29-year-old G1, P0 at 39 weeks and 4 days who admitted initially admitted for latent labor-she has since received an epidural for labor analgesia underwent amniotomy and was initiated on Pitocin for augmentation of labor. On review of fetal heart tracing since admission there is intermittent category 2 fetal heart tracing secondary to periodic variable decelerations-otherwise there is moderate variability and spontaneous 15 x 15 accelerations. Vitals and labs have been reviewed and all within normal limits. Exam is notable for cervical change-there is some asynclitism noted on exam and we will plan to move patient every 30 minutes with peanut ball. Plan to continue Pitocin titration per protocol and reassess in 1 to 2 hours or sooner if needed.     Smith Diallo MD 11/26/2023 9:04 AM

## 2023-11-26 NOTE — ANESTHESIA POSTPROCEDURE EVALUATION
Post-Op Assessment Note          Post-op block assessment: catheter intact                  BP   125/66   Temp      Pulse  97   Resp   16   SpO2   97

## 2023-11-26 NOTE — OB LABOR/OXYTOCIN SAFETY PROGRESS
Labor Progress Note - Safia Jaimes 34 y.o. female MRN: [id number]    Unit/Bed#: -01 Encounter: [id number]       Contraction Frequency (minutes): 7  Contraction Quality: Mild  Tachysystole: No   Cervical Dilation: 4        Cervical Effacement: 60  Fetal Station: -2  Baseline Rate: 135 bpm  Fetal Heart Rate: 139 BPM  FHR Category: cat 1               Vital Signs:   Vitals:    11/26/23 0035   BP: 98/53   Pulse: 75   Resp:    Temp:    SpO2:        Notes/comments:     AROM performed for clear fluid    Bravo Lewis DO 11/26/2023 1:21 AM

## 2023-11-26 NOTE — PLAN OF CARE
Problem: PAIN - ADULT  Goal: Verbalizes/displays adequate comfort level or baseline comfort level  Description: Interventions:  - Encourage patient to monitor pain and request assistance  - Assess pain using appropriate pain scale  - Administer analgesics based on type and severity of pain and evaluate response  - Implement non-pharmacological measures as appropriate and evaluate response  - Consider cultural and social influences on pain and pain management  - Notify physician/advanced practitioner if interventions unsuccessful or patient reports new pain  Outcome: Progressing     Problem: INFECTION - ADULT  Goal: Absence or prevention of progression during hospitalization  Description: INTERVENTIONS:  - Assess and monitor for signs and symptoms of infection  - Monitor lab/diagnostic results  - Monitor all insertion sites, i.e. indwelling lines, tubes, and drains  - Monitor endotracheal if appropriate and nasal secretions for changes in amount and color  - [place] appropriate cooling/warming therapies per order  - Administer medications as ordered  - Instruct and encourage patient and family to use good hand hygiene technique  - Identify and instruct in appropriate isolation precautions for identified infection/condition  Outcome: Progressing  Goal: Absence of fever/infection during neutropenic period  Description: INTERVENTIONS:  - Monitor WBC    Outcome: Progressing     Problem: SAFETY ADULT  Goal: Patient will remain free of falls  Description: INTERVENTIONS:  - Educate patient/family on patient safety including physical limitations  - Instruct patient to call for assistance with activity   - Consult OT/PT to assist with strengthening/mobility   - Keep Call bell within reach  - Keep bed low and locked with side rails adjusted as appropriate  - Keep care items and personal belongings within reach  - Initiate and maintain comfort rounds  - Make Fall Risk Sign visible to staff  - Offer Toileting every  Hours, in advance of need  - Initiate/Maintain alarm  - Obtain necessary fall risk management equipment:   - Apply yellow socks and bracelet for high fall risk patients  - Consider moving patient to room near nurses station  Outcome: Progressing  Goal: Maintain or return to baseline ADL function  Description: INTERVENTIONS:  -  Assess patient's ability to carry out ADLs; assess patient's baseline for ADL function and identify physical deficits which impact ability to perform ADLs (bathing, care of mouth/teeth, toileting, grooming, dressing, etc.)  - Assess/evaluate cause of self-care deficits   - Assess range of motion  - Assess patient's mobility; develop plan if impaired  - Assess patient's need for assistive devices and provide as appropriate  - Encourage maximum independence but intervene and supervise when necessary  - Involve family in performance of ADLs  - Assess for home care needs following discharge   - Consider OT consult to assist with ADL evaluation and planning for discharge  - Provide patient education as appropriate  Outcome: Progressing  Goal: Maintains/Returns to pre admission functional level  Description: INTERVENTIONS:  - Perform AM-PAC 6 Click Basic Mobility/ Daily Activity assessment daily.  - Set and communicate daily mobility goal to care team and patient/family/caregiver. - Collaborate with rehabilitation services on mobility goals if consulted  - Perform Range of Motion  times a day. - Reposition patient every  hours.   - Dangle patient  times a day  - Stand patient  times a day  - Ambulate patient  times a day  - Out of bed to chair  times a day   - Out of bed for meals  times a day  - Out of bed for toileting  - Record patient progress and toleration of activity level   Outcome: Progressing     Problem: DISCHARGE PLANNING  Goal: Discharge to home or other facility with appropriate resources  Description: INTERVENTIONS:  - Identify barriers to discharge w/patient and caregiver  - Arrange for needed discharge resources and transportation as appropriate  - Identify discharge learning needs (meds, wound care, etc.)  - Arrange for interpretive services to assist at discharge as needed  - Refer to Case Management Department for coordinating discharge planning if the patient needs post-hospital services based on physician/advanced practitioner order or complex needs related to functional status, cognitive ability, or social support system  Outcome: Progressing     Problem: POSTPARTUM  Goal: Experiences normal postpartum course  Description: INTERVENTIONS:  - Monitor maternal vital signs  - Assess uterine involution and lochia  Outcome: Progressing  Goal: Appropriate maternal -  bonding  Description: INTERVENTIONS:  - Identify family support  - Assess for appropriate maternal/infant bonding   -Encourage maternal/infant bonding opportunities  - Referral to  or  as needed  Outcome: Progressing  Goal: Establishment of infant feeding pattern  Description: INTERVENTIONS:  - Assess breast/bottle feeding  - Refer to lactation as needed  Outcome: Progressing  Goal: Incision(s), wounds(s) or drain site(s) healing without S/S of infection  Description: INTERVENTIONS  - Assess and document dressing, incision, wound bed, drain sites and surrounding tissue  - Provide patient and family education  - Perform skin care/dressing changes every   Outcome: Progressing

## 2023-11-26 NOTE — OB LABOR/OXYTOCIN SAFETY PROGRESS
Oxytocin Safety Progress Check Note - Teodoro Weston 34 y.o. female MRN: [id number]    Unit/Bed#: -01 Encounter: [id number]       Contraction Frequency (minutes): 2.5-4.5  Contraction Quality: Moderate, Mild  Tachysystole: No   Cervical Dilation: 4        Cervical Effacement: 60  Fetal Station: -2  Baseline Rate: 145 bpm  Fetal Heart Rate: 151 BPM  FHR Category: cat 1               Vital Signs:   Vitals:    11/25/23 2235   BP: 108/59   Pulse: 75   Resp:    Temp:    SpO2:        Notes/comments:     Sve as above.  Will consider augmentation with AROM or pitocin    Ilya Fields DO 11/25/2023 10:52 PM

## 2023-11-26 NOTE — L&D DELIVERY NOTE
Vaginal Delivery Summary - OB/GYN   Kolton Angel 34 y.o. female MRN: [id number]  Unit/Bed#: -01 Encounter: [id number]      Pre-delivery Diagnosis:   1. Pregnancy at 39 weeks   2. Normal labor     Post-delivery Diagnosis: same, delivered    Procedure: Spontaneous Vaginal Delivery     Attending: Kacie     Assistant(s): Barr    Anesthesia: Epidural    QBL: 90 mL    Complications: none apparent    Specimens:   1. Arterial and venous cord gases  2. Cord blood  3. Segment of umbilical cord  4. Placenta to storage     Findings:  1. Viable female on 2023 at 1209, with APGARS of 8 and 9 at 1 and 5 minutes respectively  2. Spontaneous delivery of intact placenta at 1214  3. No lacerations   4. Blood gases:   Arterial pH: 7.109   Arterial base excess: -12.1   Venous pH: 7.271   Venous base excess: -8.3    Disposition:  Patient tolerated the procedure well and was recovering in labor and delivery room       Brief history and labor course:  Ms. Kolton Angel is a 34 y.o.  at 43w2d. She presented to labor and delivery for contractions. Her pregnancy was uncomplicated. On exam in triage she was noted to be 2/50/-3 and made change to 3/60/-2 after a 2 hour recheck. She was admitted for normal labor. She was augmented with amniotomy for clear fluid and pitocin. She progressed to complete and started pushing. Description of procedure:  After pushing for 52 minutes, at 1209 patient delivered a viable female , wt pending, apgars of 8 (1 min) and 9 (5 min). The fetal vertex delivered spontaneously. There was a nuchal cord that was delivered through and then reduced. The left anterior shoulder delivered atraumatically with maternal expulsive forces and the assistance of gentle downward traction. The right posterior shoulder delivered with maternal expulsive forces and the assistance of gentle upward traction. The remainder of the fetus delivered spontaneously.      Upon delivery, the infant was placed on the mothers abdomen and the cord was clamped and cut after delayed cord clamping. Awaiting nurse and NICU resuscitators evaluated the . Arterial and venous cord blood gases and cord blood was collected for analysis. These were promptly sent to the lab. In the immediate post-partum, 30 units of IV pitocin was administered, and the uterus was noted to contract down well with massage and pitocin. The placenta delivered spontaneously at 1214 and was noted to have a centrally inserted 3 vessel cord. The vagina, cervix, perineum, and rectum were inspected and there was noted to be no lacerations. Bimanual exam revealed minimal clots and good uterine tone. The fundus was firm and at the level of the umbilicus. At the conclusion of the procedure, all needle, sponge, and instrument counts were noted to be correct. Patient tolerated the procedure well and was allowed to recover in labor and delivery room with family and  before being transferred to the post-partum floor. Dr. Raul Jimenez was present and participated in all key portions of the case.       Vita Peck MD  2023  12:33 PM

## 2023-11-26 NOTE — OB LABOR/OXYTOCIN SAFETY PROGRESS
Oxytocin Safety Progress Check Note - Elkin Perry 34 y.o. female MRN: [id number]    Unit/Bed#: -01 Encounter: [id number]    Dose (tiesha-units/min) Oxytocin: 10 tiesha-units/min  Contraction Frequency (minutes): 4-5  Contraction Quality: Moderate  Tachysystole: No   Cervical Dilation: 10  Dilation Complete Date: 11/26/23  Dilation Complete Time: 1103  Cervical Effacement: 100  Fetal Station: 2  Baseline Rate: 135 bpm  Fetal Heart Rate: 142 BPM  FHR Category: 2  Oxytocin Safety Progress Check: Safety check completed            Vital Signs:   Vitals:    11/26/23 1050   BP: 107/57   Pulse: 70   Resp:    Temp:    SpO2:        Notes/comments:   Occasional variable decelerations noted on FHT. SVE 10/100/+2.  Will plan to begin pushing     Megan Pabon MD 11/26/2023 11:06 AM

## 2023-11-26 NOTE — ANESTHESIA POSTPROCEDURE EVALUATION
Post-Op Assessment Note    CV Status:  Stable  Pain Score: 1    Pain management: adequate       Mental Status:  Alert and awake   Hydration Status:  Euvolemic   PONV Controlled:  Controlled   Airway Patency:  Patent     Post Op Vitals Reviewed: Yes    No anethesia notable event occurred.     Staff: Anesthesiologist               BP      Temp      Pulse     Resp      SpO2

## 2023-11-26 NOTE — PLAN OF CARE
Problem: BIRTH - VAGINAL/ SECTION  Goal: Fetal and maternal status remain reassuring during the birth process  Description: INTERVENTIONS:  - Monitor vital signs  - Monitor fetal heart rate  - Monitor uterine activity  - Monitor labor progression (vaginal delivery)  - DVT prophylaxis  - Antibiotic prophylaxis  Outcome: Progressing  Goal: Emotionally satisfying birthing experience for mother/fetus  Description: Interventions:  - Assess, plan, implement and evaluate the nursing care given to the patient in labor  - Advocate the philosophy that each childbirth experience is a unique experience and support the family's chosen level of involvement and control during the labor process   - Actively participate in both the patient's and family's teaching of the birth process  - Consider cultural, [employer] and age-specific factors and plan care for the patient in labor  Outcome: Progressing     Problem: Knowledge Deficit  Goal: Verbalizes understanding of labor plan  Description: Assess patient/family/caregiver's baseline knowledge level and ability to understand information. Provide education via patient/family/caregiver's preferred learning method at appropriate level of understanding. 1. Provide teaching at level of understanding. 2. Provide teaching via preferred learning method(s). Outcome: Progressing  Goal: Patient/family/caregiver demonstrates understanding of disease process, treatment plan, medications, and discharge instructions  Description: Complete learning assessment and assess knowledge base. Interventions:  - Provide teaching at level of understanding  - Provide teaching via preferred learning methods  Outcome: Progressing     Problem: Labor & Delivery  Goal: Manages discomfort  Description: Assess and monitor for signs and symptoms of discomfort. Assess patient's pain level regularly and per hospital policy. Administer medications as ordered.  Support use of nonpharmacological methods to help control pain such as distraction, imagery, relaxation, and application of heat and cold. Collaborate with interdisciplinary team and patient to determine appropriate pain management plan. 1. Include patient in decisions related to comfort. 2. Offer non-pharmacological pain management interventions. 3. Report ineffective pain management to physician. Outcome: Progressing  Goal: Patient vital signs are stable  Description: 1. Assess vital signs - vaginal delivery.   Outcome: Progressing     Problem: PAIN - ADULT  Goal: Verbalizes/displays adequate comfort level or baseline comfort level  Description: Interventions:  - Encourage patient to monitor pain and request assistance  - Assess pain using appropriate pain scale  - Administer analgesics based on type and severity of pain and evaluate response  - Implement non-pharmacological measures as appropriate and evaluate response  - Consider cultural and social influences on pain and pain management  - Notify physician/advanced practitioner if interventions unsuccessful or patient reports new pain  Outcome: Progressing     Problem: INFECTION - ADULT  Goal: Absence or prevention of progression during hospitalization  Description: INTERVENTIONS:  - Assess and monitor for signs and symptoms of infection  - Monitor lab/diagnostic results  - Monitor all insertion sites, i.e. indwelling lines, tubes, and drains  - Monitor endotracheal if appropriate and nasal secretions for changes in amount and color  - [place] appropriate cooling/warming therapies per order  - Administer medications as ordered  - Instruct and encourage patient and family to use good hand hygiene technique  - Identify and instruct in appropriate isolation precautions for identified infection/condition  Outcome: Progressing  Goal: Absence of fever/infection during neutropenic period  Description: INTERVENTIONS:  - Monitor WBC    Outcome: Progressing     Problem: SAFETY ADULT  Goal: Patient will remain free of falls  Description: INTERVENTIONS:  - Educate patient/family on patient safety including physical limitations  - Instruct patient to call for assistance with activity   - Consult OT/PT to assist with strengthening/mobility   - Keep Call bell within reach  - Keep bed low and locked with side rails adjusted as appropriate  - Keep care items and personal belongings within reach  - Initiate and maintain comfort rounds  - Make Fall Risk Sign visible to staff  - Offer Toileting every  Hours, in advance of need  - Initiate/Maintain alarm  - Obtain necessary fall risk management equipment:   - Apply yellow socks and bracelet for high fall risk patients  - Consider moving patient to room near nurses station  Outcome: Progressing  Goal: Maintain or return to baseline ADL function  Description: INTERVENTIONS:  -  Assess patient's ability to carry out ADLs; assess patient's baseline for ADL function and identify physical deficits which impact ability to perform ADLs (bathing, care of mouth/teeth, toileting, grooming, dressing, etc.)  - Assess/evaluate cause of self-care deficits   - Assess range of motion  - Assess patient's mobility; develop plan if impaired  - Assess patient's need for assistive devices and provide as appropriate  - Encourage maximum independence but intervene and supervise when necessary  - Involve family in performance of ADLs  - Assess for home care needs following discharge   - Consider OT consult to assist with ADL evaluation and planning for discharge  - Provide patient education as appropriate  Outcome: Progressing  Goal: Maintains/Returns to pre admission functional level  Description: INTERVENTIONS:  - Perform AM-PAC 6 Click Basic Mobility/ Daily Activity assessment daily.  - Set and communicate daily mobility goal to care team and patient/family/caregiver. - Collaborate with rehabilitation services on mobility goals if consulted  - Perform Range of Motion  times a day.   - Reposition patient every  hours.   - Dangle patient  times a day  - Stand patient  times a day  - Ambulate patient  times a day  - Out of bed to chair  times a day   - Out of bed for meals times a day  - Out of bed for toileting  - Record patient progress and toleration of activity level   Outcome: Progressing     Problem: DISCHARGE PLANNING  Goal: Discharge to home or other facility with appropriate resources  Description: INTERVENTIONS:  - Identify barriers to discharge w/patient and caregiver  - Arrange for needed discharge resources and transportation as appropriate  - Identify discharge learning needs (meds, wound care, etc.)  - Arrange for interpretive services to assist at discharge as needed  - Refer to Case Management Department for coordinating discharge planning if the patient needs post-hospital services based on physician/advanced practitioner order or complex needs related to functional status, cognitive ability, or social support system  Outcome: Progressing

## 2023-11-27 ENCOUNTER — TELEPHONE (OUTPATIENT)
Dept: OBGYN CLINIC | Facility: CLINIC | Age: 28
End: 2023-11-27

## 2023-11-27 VITALS
HEIGHT: 65 IN | SYSTOLIC BLOOD PRESSURE: 113 MMHG | OXYGEN SATURATION: 98 % | RESPIRATION RATE: 16 BRPM | BODY MASS INDEX: 34.99 KG/M2 | HEART RATE: 75 BPM | WEIGHT: 210 LBS | TEMPERATURE: 98.1 F | DIASTOLIC BLOOD PRESSURE: 60 MMHG

## 2023-11-27 LAB — TREPONEMA PALLIDUM IGG+IGM AB [PRESENCE] IN SERUM OR PLASMA BY IMMUNOASSAY: NORMAL

## 2023-11-27 PROCEDURE — 99024 POSTOP FOLLOW-UP VISIT: CPT | Performed by: STUDENT IN AN ORGANIZED HEALTH CARE EDUCATION/TRAINING PROGRAM

## 2023-11-27 PROCEDURE — NC001 PR NO CHARGE: Performed by: STUDENT IN AN ORGANIZED HEALTH CARE EDUCATION/TRAINING PROGRAM

## 2023-11-27 RX ORDER — SERTRALINE HYDROCHLORIDE 100 MG/1
150 TABLET, FILM COATED ORAL DAILY
Qty: 90 TABLET | Refills: 0
Start: 2023-11-27

## 2023-11-27 RX ORDER — SIMETHICONE 80 MG
80 TABLET,CHEWABLE ORAL 4 TIMES DAILY PRN
Qty: 30 TABLET | Refills: 0
Start: 2023-11-27

## 2023-11-27 RX ORDER — DOCUSATE SODIUM 100 MG/1
100 CAPSULE, LIQUID FILLED ORAL 2 TIMES DAILY
Refills: 0
Start: 2023-11-27

## 2023-11-27 RX ORDER — DIAPER,BRIEF,INFANT-TODD,DISP
1 EACH MISCELLANEOUS DAILY PRN
Refills: 0
Start: 2023-11-27

## 2023-11-27 RX ORDER — FAMOTIDINE 20 MG/1
20 TABLET, FILM COATED ORAL 2 TIMES DAILY
Refills: 0
Start: 2023-11-27

## 2023-11-27 RX ORDER — IBUPROFEN 600 MG/1
600 TABLET ORAL EVERY 6 HOURS
Qty: 30 TABLET | Refills: 0
Start: 2023-11-27

## 2023-11-27 RX ORDER — ACETAMINOPHEN 325 MG/1
975 TABLET ORAL EVERY 6 HOURS PRN
Refills: 0
Start: 2023-11-27

## 2023-11-27 RX ORDER — CALCIUM CARBONATE 500 MG/1
500 TABLET, CHEWABLE ORAL DAILY PRN
Refills: 0
Start: 2023-11-27

## 2023-11-27 RX ADMIN — BENZOCAINE AND LEVOMENTHOL 1 APPLICATION: 200; 5 SPRAY TOPICAL at 10:27

## 2023-11-27 RX ADMIN — FAMOTIDINE 20 MG: 20 TABLET ORAL at 08:23

## 2023-11-27 RX ADMIN — IBUPROFEN 600 MG: 600 TABLET, FILM COATED ORAL at 10:23

## 2023-11-27 RX ADMIN — IBUPROFEN 600 MG: 600 TABLET, FILM COATED ORAL at 03:45

## 2023-11-27 RX ADMIN — DOCUSATE SODIUM 100 MG: 100 CAPSULE, LIQUID FILLED ORAL at 08:23

## 2023-11-27 NOTE — LACTATION NOTE
This note was copied from a baby's chart. CONSULT - LACTATION  Baby Girl [facility]) [address] 1 days female MRN: [id number]    [address] AN NURSERY Room / Bed: (N)/(N) Encounter: [id number]    Maternal Information     MOTHER:  Bruno Ruth  Maternal Age: 34 y.o.   OB History: # 1 - Date: 23, Sex: Female, Weight: 3425 g (7 lb 8.8 oz), GA: 39w4d, Delivery: Vaginal, Spontaneous, Apgar1: 8, Apgar5: 9, Living: Living, Birth Comments: None   Previouse breast reduction surgery? No, not reviewed    Lactation history:   Has patient previously breast fed: No   How long had patient previously breast fed:     Previous breast feeding complications:       Past Surgical History:   Procedure Laterality Date    NO PAST SURGERIES          Birth information:  YOB: 2021   Time of birth: 12:08 PM   Sex: female   Delivery type: Vaginal, Spontaneous   Birth Weight: 3425 g (7 lb 8.8 oz)   Percent of Weight Change: 0%     Gestational Age: 43w3d   [unfilled]    Assessment     Breast and nipple assessment: sore nipple and large breast    [place] Assessment: No Clinical assessment    Feeding assessment: latch difficulty (Lips don't flange, able to manually flange upper lip)  LATCH:  Latch: Repeated attempts, hold nipple in mouth, stimulate to suck   Audible Swallowing: A few with stimulation (Not audible but good jaw movement)   Type of Nipple: Everted (After stimulation)   Comfort (Breast/Nipple): Filling, red/small blisters/bruises, mild/moderate discomfort (Red short length, difficult to latch)   Hold (Positioning): Partial assist, teach one side, mother does other, staff holds   LATCH Score: 6        Having latch problems? No   Position(s) Used Football;Cross Cradle   Breasts/Nipples   Left Breast Soft   Right Breast Soft   Left Nipple Sore;Everted  (Red)   Right Nipple Everted; Sore  (Red)   Intervention Lanolin;Breast shells;Hand expression   Breastfeeding Status Yes Breastfeeding Progress Not yet established;Nipple issues   Other OB Lactation Tools   Feeding Devices Shells; Lanolin   Breast Pump   Pump 3  (Zomee)   Patient Follow-Up   Lactation Consult Status 1   Follow-Up Type Inpatient; Outpatient   Other OB Lactation Documentation    Additional Problem Noted Reviewed RSB and D/C book, assisted with repositioning in cross cradle and football hold. Pt has large breasts with short shank nipples, shallow latch causing red and sore nipples. Assited and taught deeper latch and position, good HE. Appt made at baby and me. Feeding recommendations:  breast feed on demand    [place] is expressing that should would like to breastfeed as long as she can but also introduce a bottle of pumped breast milk so others can offer the baby a bottle. Reviewed RSB & DC booklets with mom. Reviewed the recommendation to offer a bottle after 1 month and to choose an articificial nipple that is similar to shape of moms nipple. Mom would like improved latch and decreased soreness. Pt noted to have large breast with short shank and B/L nipple redness. Observed first latch to be shallow with no body support for mom, Baby was misaligned and mother was bringing breast to baby. LC demonstrated deep latch in cross cradle hold on the left side and football hold on the right side. Despite deep latch and no soreness, baby having continued difficulty flanging top lip out. LC and mother able to manually flange lip with education and demonstration. Mom and dad performed unassisted latch in the football position on the left side. Appointment set up for follow up at [address] [facility]. Encouraged to call for assistance while still at hospital.     Information on hand expression given. Discussed benefits of knowing how to manually express breast including stimulating milk supply, softening nipple for latch and evacuating breast in the event of engorgement.     Mom is encouraged to place baby skin to skin for feedings. Skin to skin education provided for baby placement on mother's chest, baby only in diaper, blankets below shoulders on baby's back. Skin to skin is encouraged to continue at home for feedings and between feedings. Worked on positioning infant up at chest level and starting to feed infant with nose arriving at the nipple. Then, using areolar compression to achieve a deep latch that is comfortable and exchanges optimum amounts of milk. - Start feedings on breast that last feeding ended   - allow no more than 3 hours between breast feeding sessions   - time between feedings is counted from the beginning of the first feed to the beginning of the next feeding session    Reviewed early signs of hunger, including tensing of hands and shoulders - no need to wait for open eyes. Crying is a late hunger sign. If baby is crying, soothe baby first and then attempt to latch. Reviewed normal sucking patterns: transition from stimulation to nutritive to release or non-nutritive. The goal is to see and hear lots of swallowing. Reviewed normal nursing pattern: infant could latch on one breast up to 30 minutes or until releases on own. Signs of satiation is open hand with fingers that do not grab your finger. Discussed difference in sensation of non-nutritive v nutritive sucking    Met with mother. Provided mother with Ready, Set, Baby booklet. Discussed Skin to Skin contact an benefits to mom and baby. Spoke about the benefits of rooming in. Feeding on cue and what that means for recognizing infant's hunger. Avoidance of pacifiers for the first month discussed. Talked about exclusive breastfeeding for the first 6 months. Positioning and latch reviewed as well as showing images of other feeding positions. Discussed the properties of a good latch in any position. Reviewed hand/manual expression.   Discussed s/s that baby is getting enough milk and some s/s that breastfeeding dyad may need further help.    Gave information on common concerns, what to expect the first few weeks after delivery, preparing for other caregivers, and how partners can help. Resources for support also provided. Encouraged parents to call for assistance, questions, and concerns about breastfeeding. Extension provided. Provided education on growth spurts, when to introduce bottles; paced bottle feeding, and non-nutritive suck at the breast. Provided education on Signs of satiation. Encouraged to call lactation to observe a latch prior to discharge for reassurance. Encouraged to call baby and me with any questions and closely monitor output. Education on positioning and alignment. Mom is encouraged to:     - Bring baby up to the breast (use of pillows to elevate so baby's torso is against mom's breasts)   - Skin to skin for feedings with top hand exposed to show signs of satiation   - Chin deep into breast tissue (make baby look up to the nipple)   - nose aligned to the nipple   -Wait for wide gape, drag chin on the breast so nipple is aimed at the upper, back palate  - Cheek should be touching breast   - Deep, firm hold of baby with ear, shoulder, hip alignment    Mom's nipple everts with stimulation. With nipple compression, short shank noted. Education on ways to elongate the nipple: Hand expression, breast shells.      Handouts:   Latch Checklist  Nipple Pain,   Large Breasts,   Paced bottle,          Obey James RN 11/27/2023 11:58 AM

## 2023-11-27 NOTE — DISCHARGE SUMMARY
Discharge Summary - OB/GYN  Edelmira Fabian 34 y.o. female MRN: [id number]  Unit/Bed#: -01 Encounter: [id number]    Admission Date: 2023     Discharge Date: 2023    Admitting Attending: Jesus Burkett MD    Delivering Attending: Dr Chaitanya Mcghee MD    Discharging Attending: Dr. Gisell Barger    Principal Diagnosis: Pregnancy at 39w4d    Secondary Diagnosis:   1.  s/p normal labor    Procedures: spontaneous vaginal delivery    Anesthesia: epidural    Hospital course: Edelmira Fabian is a 34 y.o.  at 39w3d who was initially admitted for contractions and labor. Amniotomy was performed for clear fluid. She delivered a viable female  on 2023 at 1209. Weight 7lbs 8.8oz via normal spontaneous vaginal delivery. Apgars were 8 (1 min) and 9 (5 min).  was transferred to  nursery. Patient tolerated the procedure well. Her post-delivery course was uncomplicated. Her postpartum pain was well controlled with oral analgesics. On day of discharge, she was ambulating and able to reasonably perform all ADLs. She was voiding and had appropriate bowel function. Pain was well controlled. She was discharged home on postpartum day #1 without complications. Patient was instructed to follow up with her OB as an outpatient and was given appropriate warnings to call provider if she develops signs of infection or uncontrolled pain. Complications: none apparent    Condition at discharge: stable     Discharge instructions/Information to patient and family:   See after visit summary for information provided to patient and family. Provisions for Follow-Up Care:  See after visit summary for information related to follow-up care and any pertinent home health orders. Disposition: See After Visit Summary for discharge disposition information. Planned Readmission: No    Discharge medications and instructions:   Please see AVS for full list of medications upon discharge.

## 2023-11-27 NOTE — UTILIZATION REVIEW
Mother and baby discharged on 11/27/2023       NOTIFICATION [address]   This is a Notification of Discharge from [address] Please be advised that this patient has been discharge from our facility. Below you will find the admission and discharge date and time including the patient’s disposition. UTILIZATION REVIEW CONTACT:  Shaq Hernandez  Utilization   Network Utilization Review Department  Phone: [phone] x carefully listen to the prompts. All voicemails are confidential.  Email: [email or web address]     ADMISSION INFORMATION  PRESENTATION DATE: 11/25/2023  1:48 PM  OBERVATION ADMISSION DATE:   INPATIENT ADMISSION DATE: 11/25/23  3:01 PM   DISCHARGE DATE: 11/27/2023  2:54 PM   DISPOSITION:Home/Self Care    Network Utilization Review Department  ATTENTION: Please call with any questions or concerns to [phone] and carefully listen to the prompts so that you are directed to the right person. All voicemails are confidential.   For Discharge needs, contact Care Management DC Support Team at [phone] opt. 2  Send all requests for admission clinical reviews, approved or denied determinations and any other requests to dedicated fax number below belonging to the campus where the patient is receiving treatment.  List of dedicated fax numbers for the Facilities:  [place] DENIALS (Administrative/Medical Necessity) [phone]   DISCHARGE SUPPORT TEAM (Network) [phone]   [address] [facility] (Maternity/NICU/Pediatrics) [phone]   [address] [facility] [address] [facility] [facility] [address] [place] [address] [address] [place] [address]. [facility] [address]  [place] [phone]

## 2023-11-27 NOTE — PLAN OF CARE
Problem: PAIN - ADULT  Goal: Verbalizes/displays adequate comfort level or baseline comfort level  Description: Interventions:  - Encourage patient to monitor pain and request assistance  - Assess pain using appropriate pain scale  - Administer analgesics based on type and severity of pain and evaluate response  - Implement non-pharmacological measures as appropriate and evaluate response  - Consider cultural and social influences on pain and pain management  - Notify physician/advanced practitioner if interventions unsuccessful or patient reports new pain  Outcome: Completed     Problem: INFECTION - ADULT  Goal: Absence or prevention of progression during hospitalization  Description: INTERVENTIONS:  - Assess and monitor for signs and symptoms of infection  - Monitor lab/diagnostic results  - Monitor all insertion sites, i.e. indwelling lines, tubes, and drains  - Monitor endotracheal if appropriate and nasal secretions for changes in amount and color  - [place] appropriate cooling/warming therapies per order  - Administer medications as ordered  - Instruct and encourage patient and family to use good hand hygiene technique  - Identify and instruct in appropriate isolation precautions for identified infection/condition  Outcome: Completed  Goal: Absence of fever/infection during neutropenic period  Description: INTERVENTIONS:  - Monitor WBC    Outcome: Completed     Problem: SAFETY ADULT  Goal: Patient will remain free of falls  Description: INTERVENTIONS:  - Educate patient/family on patient safety including physical limitations  - Instruct patient to call for assistance with activity   - Consult OT/PT to assist with strengthening/mobility   - Keep Call bell within reach  - Keep bed low and locked with side rails adjusted as appropriate  - Keep care items and personal belongings within reach  - Initiate and maintain comfort rounds  - Make Fall Risk Sign visible to staff  - Apply yellow socks and bracelet for high fall risk patients  - Consider moving patient to room near nurses station  Outcome: Completed  Goal: Maintain or return to baseline ADL function  Description: INTERVENTIONS:  -  Assess patient's ability to carry out ADLs; assess patient's baseline for ADL function and identify physical deficits which impact ability to perform ADLs (bathing, care of mouth/teeth, toileting, grooming, dressing, etc.)  - Assess/evaluate cause of self-care deficits   - Assess range of motion  - Assess patient's mobility; develop plan if impaired  - Assess patient's need for assistive devices and provide as appropriate  - Encourage maximum independence but intervene and supervise when necessary  - Involve family in performance of ADLs  - Assess for home care needs following discharge   - Consider OT consult to assist with ADL evaluation and planning for discharge  - Provide patient education as appropriate  Outcome: Completed  Goal: Maintains/Returns to pre admission functional level  Description: INTERVENTIONS:  - Perform AM-PAC 6 Click Basic Mobility/ Daily Activity assessment daily.  - Set and communicate daily mobility goal to care team and patient/family/caregiver.    - Collaborate with rehabilitation services on mobility goals if consulted  - Out of bed for toileting  - Record patient progress and toleration of activity level   Outcome: Completed     Problem: DISCHARGE PLANNING  Goal: Discharge to home or other facility with appropriate resources  Description: INTERVENTIONS:  - Identify barriers to discharge w/patient and caregiver  - Arrange for needed discharge resources and transportation as appropriate  - Identify discharge learning needs (meds, wound care, etc.)  - Arrange for interpretive services to assist at discharge as needed  - Refer to Case Management Department for coordinating discharge planning if the patient needs post-hospital services based on physician/advanced practitioner order or complex needs related to functional status, cognitive ability, or social support system  Outcome: Completed     Problem: POSTPARTUM  Goal: Experiences normal postpartum course  Description: INTERVENTIONS:  - Monitor maternal vital signs  - Assess uterine involution and lochia  Outcome: Completed  Goal: Appropriate maternal -  bonding  Description: INTERVENTIONS:  - Identify family support  - Assess for appropriate maternal/infant bonding   -Encourage maternal/infant bonding opportunities  - Referral to  or  as needed  Outcome: Completed  Goal: Establishment of infant feeding pattern  Description: INTERVENTIONS:  - Assess breast/bottle feeding  - Refer to lactation as needed  Outcome: Completed  Goal: Incision(s), wounds(s) or drain site(s) healing without S/S of infection  Description: INTERVENTIONS  - Assess and document dressing, incision, wound bed, drain sites and surrounding tissue  - Provide patient and family education  Outcome: Completed     Problem: ALTERATION IN THE BREAST  Goal: Optimize infant feeding at the breast  Description: INTERVENTIONS:  - Latch, breast and nipple assessment  - Assess prior breast feeding history  - Hand expression of breast milk  - For cracked, bleeding and or sore nipples reassess latch, treat damaged nipple  -Educate mother on feeding cues  -Positioning/latch techniques  Outcome: Completed     Problem: INADEQUATE LATCH, SUCK OR SWALLOW  Goal: Demonstrate ability to latch and sustain latch, audible swallowing and satiety  Description: INTERVENTIONS:  - Assess oral anatomy, notify Physician/AP for abnormal findings  - Establish milk expression  - Maximize feeding opportunity (skin to skin, behavioral state)  - Position/latch techniques  - Discourage use of pacifier-artificial nipple  - Mechanical pumping  - Nipple Shield  - Supplemental formula feeding (Physician/AP order)  - Alternative feeding method  Outcome: Completed

## 2023-11-27 NOTE — PLAN OF CARE
Problem: POSTPARTUM  Goal: Experiences normal postpartum course  Description: INTERVENTIONS:  - Monitor maternal vital signs  - Assess uterine involution and lochia  Outcome: Adequate for Discharge  Goal: Appropriate maternal -  bonding  Description: INTERVENTIONS:  - Identify family support  - Assess for appropriate maternal/infant bonding   -Encourage maternal/infant bonding opportunities  - Referral to  or  as needed  Outcome: Adequate for Discharge  Goal: Establishment of infant feeding pattern  Description: INTERVENTIONS:  - Assess breast/bottle feeding  - Refer to lactation as needed  Outcome: Adequate for Discharge  Goal: Incision(s), wounds(s) or drain site(s) healing without S/S of infection  Description: INTERVENTIONS  - Assess and document dressing, incision, wound bed, drain sites and surrounding tissue  - Provide patient and family education  - Perform skin care/dressing changes every   Outcome: Adequate for Discharge     Problem: PAIN - ADULT  Goal: Verbalizes/displays adequate comfort level or baseline comfort level  Description: Interventions:  - Encourage patient to monitor pain and request assistance  - Assess pain using appropriate pain scale  - Administer analgesics based on type and severity of pain and evaluate response  - Implement non-pharmacological measures as appropriate and evaluate response  - Consider cultural and social influences on pain and pain management  - Notify physician/advanced practitioner if interventions unsuccessful or patient reports new pain  Outcome: Adequate for Discharge     Problem: INFECTION - ADULT  Goal: Absence or prevention of progression during hospitalization  Description: INTERVENTIONS:  - Assess and monitor for signs and symptoms of infection  - Monitor lab/diagnostic results  - Monitor all insertion sites, i.e. indwelling lines, tubes, and drains  - Monitor endotracheal if appropriate and nasal secretions for changes in amount and color  - [place] appropriate cooling/warming therapies per order  - Administer medications as ordered  - Instruct and encourage patient and family to use good hand hygiene technique  - Identify and instruct in appropriate isolation precautions for identified infection/condition  Outcome: Adequate for Discharge  Goal: Absence of fever/infection during neutropenic period  Description: INTERVENTIONS:  - Monitor WBC    Outcome: Adequate for Discharge     Problem: SAFETY ADULT  Goal: Patient will remain free of falls  Description: INTERVENTIONS:  - Educate patient/family on patient safety including physical limitations  - Instruct patient to call for assistance with activity   - Consult OT/PT to assist with strengthening/mobility   - Keep Call bell within reach  - Keep bed low and locked with side rails adjusted as appropriate  - Keep care items and personal belongings within reach  - Initiate and maintain comfort rounds  - Make Fall Risk Sign visible to staff  - Offer Toileting every  Hours, in advance of need  - Initiate/Maintain alarm  - Obtain necessary fall risk management equipment:  - Apply yellow socks and bracelet for high fall risk patients  - Consider moving patient to room near nurses station  Outcome: Adequate for Discharge  Goal: Maintain or return to baseline ADL function  Description: INTERVENTIONS:  -  Assess patient's ability to carry out ADLs; assess patient's baseline for ADL function and identify physical deficits which impact ability to perform ADLs (bathing, care of mouth/teeth, toileting, grooming, dressing, etc.)  - Assess/evaluate cause of self-care deficits   - Assess range of motion  - Assess patient's mobility; develop plan if impaired  - Assess patient's need for assistive devices and provide as appropriate  - Encourage maximum independence but intervene and supervise when necessary  - Involve family in performance of ADLs  - Assess for home care needs following discharge   - Consider OT consult to assist with ADL evaluation and planning for discharge  - Provide patient education as appropriate  Outcome: Adequate for Discharge  Goal: Maintains/Returns to pre admission functional level  Description: INTERVENTIONS:  - Perform AM-PAC 6 Click Basic Mobility/ Daily Activity assessment daily.  - Set and communicate daily mobility goal to care team and patient/family/caregiver. - Collaborate with rehabilitation services on mobility goals if consulted  - Perform Range of Motion  times a day. - Reposition patient every  hours.   - Dangle patient  times a day  - Stand patient times a day  - Ambulate patient  times a day  - Out of bed to chair  times a day   - Out of bed for meals times a day  - Out of bed for toileting  - Record patient progress and toleration of activity level   Outcome: Adequate for Discharge     Problem: DISCHARGE PLANNING  Goal: Discharge to home or other facility with appropriate resources  Description: INTERVENTIONS:  - Identify barriers to discharge w/patient and caregiver  - Arrange for needed discharge resources and transportation as appropriate  - Identify discharge learning needs (meds, wound care, etc.)  - Arrange for interpretive services to assist at discharge as needed  - Refer to Case Management Department for coordinating discharge planning if the patient needs post-hospital services based on physician/advanced practitioner order or complex needs related to functional status, cognitive ability, or social support system  Outcome: Adequate for Discharge     Problem: ALTERATION IN THE BREAST  Goal: Optimize infant feeding at the breast  Description: INTERVENTIONS:  - Latch, breast and nipple assessment  - Assess prior breast feeding history  - Hand expression of breast milk  - For cracked, bleeding and or sore nipples reassess latch, treat damaged nipple  -Educate mother on feeding cues  -Positioning/latch techniques  Outcome: Adequate for Discharge     Problem: INADEQUATE LATCH, SUCK OR SWALLOW  Goal: Demonstrate ability to latch and sustain latch, audible swallowing and satiety  Description: INTERVENTIONS:  - Assess oral anatomy, notify Physician/AP for abnormal findings  - Establish milk expression  - Maximize feeding opportunity (skin to skin, behavioral state)  - Position/latch techniques  - Discourage use of pacifier-artificial nipple  - Mechanical pumping  - Nipple Shield  - Supplemental formula feeding (Physician/AP order)  - Alternative feeding method  Outcome: Adequate for Discharge

## 2023-11-27 NOTE — PLAN OF CARE
Problem: PAIN - ADULT  Goal: Verbalizes/displays adequate comfort level or baseline comfort level  Description: Interventions:  - Encourage patient to monitor pain and request assistance  - Assess pain using appropriate pain scale  - Administer analgesics based on type and severity of pain and evaluate response  - Implement non-pharmacological measures as appropriate and evaluate response  - Consider cultural and social influences on pain and pain management  - Notify physician/advanced practitioner if interventions unsuccessful or patient reports new pain  Outcome: Progressing     Problem: INFECTION - ADULT  Goal: Absence or prevention of progression during hospitalization  Description: INTERVENTIONS:  - Assess and monitor for signs and symptoms of infection  - Monitor lab/diagnostic results  - Monitor all insertion sites, i.e. indwelling lines, tubes, and drains  - Monitor endotracheal if appropriate and nasal secretions for changes in amount and color  - [place] appropriate cooling/warming therapies per order  - Administer medications as ordered  - Instruct and encourage patient and family to use good hand hygiene technique  - Identify and instruct in appropriate isolation precautions for identified infection/condition  Outcome: Progressing  Goal: Absence of fever/infection during neutropenic period  Description: INTERVENTIONS:  - Monitor WBC    Outcome: Progressing     Problem: SAFETY ADULT  Goal: Patient will remain free of falls  Description: INTERVENTIONS:  - Educate patient/family on patient safety including physical limitations  - Instruct patient to call for assistance with activity   - Consult OT/PT to assist with strengthening/mobility   - Keep Call bell within reach  - Keep bed low and locked with side rails adjusted as appropriate  - Keep care items and personal belongings within reach  - Initiate and maintain comfort rounds  - Make Fall Risk Sign visible to staff  - Offer Toileting every  Hours, in advance of need  - Initiate/Maintain alarm  - Obtain necessary fall risk management equipment:   - Apply yellow socks and bracelet for high fall risk patients  - Consider moving patient to room near nurses station  Outcome: Progressing  Goal: Maintain or return to baseline ADL function  Description: INTERVENTIONS:  -  Assess patient's ability to carry out ADLs; assess patient's baseline for ADL function and identify physical deficits which impact ability to perform ADLs (bathing, care of mouth/teeth, toileting, grooming, dressing, etc.)  - Assess/evaluate cause of self-care deficits   - Assess range of motion  - Assess patient's mobility; develop plan if impaired  - Assess patient's need for assistive devices and provide as appropriate  - Encourage maximum independence but intervene and supervise when necessary  - Involve family in performance of ADLs  - Assess for home care needs following discharge   - Consider OT consult to assist with ADL evaluation and planning for discharge  - Provide patient education as appropriate  Outcome: Progressing  Goal: Maintains/Returns to pre admission functional level  Description: INTERVENTIONS:  - Perform AM-PAC 6 Click Basic Mobility/ Daily Activity assessment daily.  - Set and communicate daily mobility goal to care team and patient/family/caregiver. - Collaborate with rehabilitation services on mobility goals if consulted  - Perform Range of Motion  times a day. - Reposition patient every hours.   - Dangle patient  times a day  - Stand patient  times a day  - Ambulate patient  times a day  - Out of bed to chair times a day   - Out of bed for meals  times a day  - Out of bed for toileting  - Record patient progress and toleration of activity level   Outcome: Progressing     Problem: DISCHARGE PLANNING  Goal: Discharge to home or other facility with appropriate resources  Description: INTERVENTIONS:  - Identify barriers to discharge w/patient and caregiver  - Arrange for needed discharge resources and transportation as appropriate  - Identify discharge learning needs (meds, wound care, etc.)  - Arrange for interpretive services to assist at discharge as needed  - Refer to Case Management Department for coordinating discharge planning if the patient needs post-hospital services based on physician/advanced practitioner order or complex needs related to functional status, cognitive ability, or social support system  Outcome: Progressing     Problem: POSTPARTUM  Goal: Experiences normal postpartum course  Description: INTERVENTIONS:  - Monitor maternal vital signs  - Assess uterine involution and lochia  Outcome: Progressing  Goal: Appropriate maternal -  bonding  Description: INTERVENTIONS:  - Identify family support  - Assess for appropriate maternal/infant bonding   -Encourage maternal/infant bonding opportunities  - Referral to  or  as needed  Outcome: Progressing  Goal: Establishment of infant feeding pattern  Description: INTERVENTIONS:  - Assess breast/bottle feeding  - Refer to lactation as needed  Outcome: Progressing  Goal: Incision(s), wounds(s) or drain site(s) healing without S/S of infection  Description: INTERVENTIONS  - Assess and document dressing, incision, wound bed, drain sites and surrounding tissue  - Provide patient and family education  - Perform skin care/dressing changes every   Outcome: Progressing

## 2023-11-27 NOTE — PROGRESS NOTES
Progress Note - OB/GYN  Ray Watkins 34 y.o. female MRN: [id number]  Unit/Bed#: -01 Encounter: [id number]    Assessment and Plan     Ray Watkins is a patient of: [address] [place] [address]. She is PPD# 1 s/p  spontaneous vaginal delivery  Recovering well and is stable        (spontaneous vaginal delivery)  Assessment & Plan  Lochia WNL   Recovering well   Appropriate bowel and bladder function   Pain well controlled   Tolerating diet   Breastfeeding   Ambulating without issues   No lower extremity tenderness  GBS negative   Rh positive       * Normal labor  Assessment & Plan  Admit to OB/GYN service  Follow up CBC, RPR, Blood Type  EFW: 58th%  VTX by transabdominal ultrasound   GBS neg status  Analgesia and/or epidural at patient request  Anticipate           Disposition    - Anticipate discharge home on PPD# 1      Subjective/Objective     Chief Complaint: Postpartum State     Subjective:    Ray Watkins is PPD/POD#1 s/p  spontaneous vaginal delivery. She has no current complaints. Pain is well controlled. Patient is currently voiding. She is ambulating. Patient is currently passing flatus and has had no bowel movement. She is tolerating PO, and denies nausea or vomitting. Patient denies fever, chills, chest pain, shortness of breath, or calf tenderness. Lochia is normal. She is  Breastfeeding. She is recovering well and is stable.        Vitals:   /62 (BP Location: Left arm)   Pulse 68   Temp 97.8 °F (36.6 °C) (Oral)   Resp 18   Ht 5' 5" (1.651 m)   Wt 95.3 kg (210 lb)   LMP 2023 (Exact Date)   SpO2 96%   Breastfeeding Yes   BMI 34.95 kg/m²       Intake/Output Summary (Last 24 hours) at 2023 0657  Last data filed at 2023 1751  Gross per 24 hour   Intake --   Output 1740 ml   Net -1740 ml       Invasive Devices       Peripheral Intravenous Line  Duration             Peripheral IV 23 Left Antecubital 1 day                    Physical Exam:   GEN: Daniel Mitchell appears well, alert and oriented x 3, pleasant and cooperative   CARDIO: RRR, no murmurs or rubs  RESP:  CTAB, no wheezes or rales  ABDOMEN: soft, no tenderness, no distention, fundus firm, Incision C/D/I  EXTREMITIES: SCDs on, non tender, no erythema, b/l Skylar's sign negative      Labs:     Hemoglobin   Date Value Ref Range Status   11/25/2023 11.6 11.5 - 15.4 g/dL Final   10/12/2023 11.1 (L) 11.5 - 15.4 g/dL Final     WBC   Date Value Ref Range Status   11/25/2023 11.00 (H) 4.31 - 10.16 Thousand/uL Final   10/12/2023 12.53 (H) 4.31 - 10.16 Thousand/uL Final     Platelets   Date Value Ref Range Status   11/25/2023 247 149 - 390 Thousands/uL Final   10/12/2023 243 149 - 390 Thousands/uL Final     Creatinine   Date Value Ref Range Status   10/12/2023 0.55 (L) 0.60 - 1.30 mg/dL Final     Comment:     Standardized to IDMS reference method   03/14/2020 0.99 0.60 - 1.30 mg/dL Final     Comment:     Standardized to IDMS reference method     AST   Date Value Ref Range Status   10/12/2023 22 13 - 39 U/L Final   03/14/2020 21 5 - 45 U/L Final     Comment:       Specimen collection should occur prior to Sulfasalazine administration due to the potential for falsely depressed results. ALT   Date Value Ref Range Status   10/12/2023 17 7 - 52 U/L Final     Comment:     Specimen collection should occur prior to Sulfasalazine administration due to the potential for falsely depressed results. 03/14/2020 35 12 - 78 U/L Final     Comment:       Specimen collection should occur prior to Sulfasalazine and/or Sulfapyridine administration due to the potential for falsely depressed results.            Eusebio Mejia DO  11/27/2023  6:57 AM

## 2023-11-27 NOTE — UTILIZATION REVIEW
NOTIFICATION OF INPATIENT ADMISSION   MATERNITY/DELIVERY AUTHORIZATION REQUEST   SERVICING FACILITY:   [address] [place] [address] - L&D, [place], NICU  [address] [facility]. Gil Chin [address] [place] [address]  Tax ID: [id number]  NPI: [id number]   ATTENDING PROVIDER:  Attending Name and NPI#: John Doty Md [[id number]]  Address: [address] [place] [address]. Gil Chin [address] [place] [address]  Phone: [phone]   ADMISSION INFORMATION:  [facility] [address] [facility]   Place of Service Code: 21  Inpatient Admission Date/Time: 23  3:01 PM  Discharge Date/Time: No discharge date for patient encounter. Admitting Diagnosis Code/Description:  Uterine contractions during pregnancy [O47.9]  Encounter for full-term uncomplicated delivery [Q53]     Mother: Phylicia Ramey 1995 Estimated Date of Delivery: 23  Delivering clinician: Skinny Hester    OB History          1    Para   1    Term   1       0    AB   0    Living   1         SAB   0    IAB   0    Ectopic   0    Multiple   0    Live Births   1                Name & MRN:   Information for the patient's :  Zahira Lora Girl Ava Moses) [[id number]]   [place] Delivery Information:  Sex: female  Delivered 2023 12:08 PM by Vaginal, Spontaneous; Gestational Age: 43w3d    [place] Measurements:  Weight: 7 lb 8.8 oz (3425 g); Height: 20"    APGAR 1 minute 5 minutes 10 minutes   Totals: 8 9      [place] Birth Information: 34 y.o. female MRN: [id number] Unit/Bed#: -01   Birthweight: No birth weight on file. Gestational Age: <None> Delivery Type:    APGARS Totals:        UTILIZATION REVIEW CONTACT:  Rashid Marisol, Utilization   Network Utilization Review Department  Phone: [phone]  Fax [phone]  Email: [email or web address]  Contact for approvals/pending authorizations, clinical reviews, and discharge.      PHYSICIAN ADVISORY SERVICES:  Medical Necessity Denial & [place] Review  Phone: [phone]  Fax: [phone]  Email: [email or web address]     DISCHARGE SUPPORT TEAM:  For Patients Discharge Needs & Updates  Phone: [phone] opt. 2 Fax: [phone]  Email: [email or web address]

## 2023-11-30 ENCOUNTER — TELEPHONE (OUTPATIENT)
Dept: OBGYN CLINIC | Facility: CLINIC | Age: 28
End: 2023-11-30

## 2023-11-30 NOTE — TELEPHONE ENCOUNTER
Called pt- Informed of [facility] recommendations. Pt verbalized understanding. Will call if she would like to move up PP appt of would like to discuss further with provider, otherwise will keep already scheduled appt.

## 2023-11-30 NOTE — TELEPHONE ENCOUNTER
Ms. Kurtz Grounds just delivered on 11/26/23 and is questioning when she should start her Birth Control Pills? Immediately or after Post Partum Visit. Please advise.

## 2023-11-30 NOTE — TELEPHONE ENCOUNTER
Progesterone only pills can typically be started at any time following delivery, however, it is recommended to wait until 4-6 weeks PP to allow for full establishment of milk supply. She has upcoming appt with [facility] for routine PP exam on 12/21/23. Would recommend abstinence until has repeat pelvic exam to ensure bleeding has stopped and healing has occurred. Can plan to initiate contraception at that time. If planning to become active prior or wants to start sooner, please try to move up appt or provider can call to address.     Thanks,  River Stanford

## 2023-11-30 NOTE — TELEPHONE ENCOUNTER
Pt delivered 3 days ago and wants to know when to restart ocs? She wants to know so that she is covered for pregnancy immediately when she has sex. She won't have to wait a few weeks.

## 2023-12-03 LAB — PLACENTA IN STORAGE: NORMAL

## 2023-12-04 ENCOUNTER — OFFICE VISIT (OUTPATIENT)
Dept: POSTPARTUM | Facility: CLINIC | Age: 28
End: 2023-12-04
Payer: COMMERCIAL

## 2023-12-04 ENCOUNTER — PATIENT MESSAGE (OUTPATIENT)
Dept: FAMILY MEDICINE CLINIC | Facility: CLINIC | Age: 28
End: 2023-12-04

## 2023-12-04 VITALS — DIASTOLIC BLOOD PRESSURE: 76 MMHG | SYSTOLIC BLOOD PRESSURE: 122 MMHG

## 2023-12-04 DIAGNOSIS — F41.8 DEPRESSION WITH ANXIETY: ICD-10-CM

## 2023-12-04 PROCEDURE — 99404 PREV MED CNSL INDIV APPRX 60: CPT | Performed by: PEDIATRICS

## 2023-12-04 NOTE — PROGRESS NOTES
INITIAL BREAST FEEDING EVALUATION    Informant/Relationship: Chintan and Willy    Discussion of General Lactation Issues: Breastfeeding was painful initially but that has gotten better. Currently, Chintan is concerned that Junior "chokes" periodically while feeding. Infant is 6days old today.  History:  Fertility Problem:yes - took 18 months to conceive. Sought support from fertility specialist but conceived naturally. Breast changes:yes - breasts were cope and tender  : yes - labor augmented with pitocin, had an epidural  Full term:yes - 39 4/7 weeks   labor:no  First nursing/attempt < 1 hour after birth:yes - baby latched in the delivery room . Latch was painful  Skin to skin following delivery:yes - in the delivery room  Breast changes after delivery:yes - breasts are cope.   Milk was in by day 4  Rooming in (infant in room with mother with exception of procedures, eg. Circumcision:  went to the [facility] for 2 hours   Blood sugar issues:no  NICU stay:no  Jaundice:no  Phototherapy:no  Supplement given: (list supplement and method used as well as reason(s):yes - previously expressed colostrum via syringe    Past Medical History:   Diagnosis Date    Allergic rhinitis due to pollen     LAST ASSESSED [id number]    Anxiety     Depression     Has not taken medication for 6 months    Dysmenorrhea     LAST ASSESSED [id number]    Globus sensation     LAST ASSESSED [id number]    Varicella     Wrist sprain     LAST ASSESSED [id number]         Current Outpatient Medications:     acetaminophen (TYLENOL) 325 mg tablet, Take 3 tablets (975 mg total) by mouth every 6 (six) hours as needed for mild pain, moderate pain or severe pain, Disp: , Rfl: 0    benzocaine-menthol-lanolin-aloe (DERMOPLAST) 20-0.5 % topical spray, Apply 1 Application topically every 6 (six) hours as needed for mild pain or irritation, Disp: , Rfl: 0    calcium carbonate (TUMS) 500 mg chewable tablet, Chew 1 tablet (500 mg total) daily as needed for indigestion or heartburn, Disp: , Rfl: 0    docusate sodium (COLACE) 100 mg capsule, Take 1 capsule (100 mg total) by mouth 2 (two) times a day, Disp: , Rfl: 0    famotidine (PEPCID) 20 mg tablet, Take 1 tablet (20 mg total) by mouth 2 (two) times a day, Disp: , Rfl: 0    hydrocortisone 1 % cream, Apply 1 Application topically daily as needed for irritation or rash, Disp: , Rfl: 0    hydrOXYzine HCL (ATARAX) 25 mg tablet, Take 1 tablet (25 mg total) by mouth daily at bedtime as needed for itching or anxiety, Disp: 10 tablet, Rfl: 0    ibuprofen (MOTRIN) 600 mg tablet, Take 1 tablet (600 mg total) by mouth every 6 (six) hours, Disp: 30 tablet, Rfl: 0    sertraline (ZOLOFT) 100 mg tablet, Take 1.5 tablets (150 mg total) by mouth daily, Disp: 90 tablet, Rfl: 0    simethicone (MYLICON) 80 mg chewable tablet, Chew 1 tablet (80 mg total) 4 (four) times a day as needed for flatulence, Disp: 30 tablet, Rfl: 0    witch hazel-glycerin (TUCKS) topical pad, Apply 1 Pad topically every 4 (four) hours as needed for irritation, Disp: , Rfl: 0    Allergies   Allergen Reactions    Fruit C [Ascorbate - Food Allergy] GI Intolerance     Fresh Fruit- stomach pains     Pollen Extract Allergic Rhinitis       Social History     Substance and Sexual Activity   Drug Use No       Social History Never a smoker    Interval Breastfeeding History:  Frequency of breast feeding: On demand every 2-3 hours. There have been periods of cluster feeding and isolated longer intervals of 4-5 hours. Does mother feel breastfeeding is effective: Yes  Does infant appear satisfied after nursing:Yes  Stooling pattern normal: Yes  Urinating frequently:Yes  Using shield or shells: Yes     Alternative/Artificial Feedings:   Bottle: Yes, once. Used a Dr Rojas Share bottle.   Used paced feeding technique  Cup: No  Syringe/Finger: No           Formula Type: none                     Amount: n/a            Breast Milk:                      Amount: 2 ounces            Frequency Q 2-3 Hr between feedings  Elimination Problems: No      Equipment:  Nipple Shield             Type: none             Size: n/a             Frequency of Use: n/a  Pump            Type: Zomee Z2 and Haakaa            Frequency of Use: Used the Zomee twice when Chintan needed a break for feeding  Shells            Type: none            Frequency of use: n/a    Equipment Problems: no    Mom:  Breast: Large symmetrical breasts. Rounded shape. Closely spaced. Firm and full  Nipple Assessment in General: Normal: elongated/eraser, no discoloration and no damage noted. Mother's Awareness of Feeding Cues                 Recognizes: Yes                  Verbalizes: Yes  Support System: FOB, extended family  History of Breastfeeding: none  Changes/Stressors/Violence: Burleson is concerned that [place] frequently appears overwhelmed while nursing. Concerns/Goals: Burleson desires to exclusively directly breastfeed. Problems with Mom: occasional attachment related pain. Physical Exam  Constitutional:       Appearance: Normal appearance. HENT:      Head: Normocephalic and atraumatic. Cardiovascular:      Rate and Rhythm: Normal rate and regular rhythm. Pulses: Normal pulses. Heart sounds: Normal heart sounds. Pulmonary:      Effort: Pulmonary effort is normal.      Breath sounds: Normal breath sounds. Musculoskeletal:         General: Normal range of motion. Cervical back: Normal range of motion and neck supple. Neurological:      Mental Status: She is alert and oriented to person, place, and time. Skin:     General: Skin is warm and dry. Psychiatric:         Mood and Affect: Mood normal.         Behavior: Behavior normal.         Thought Content:  Thought content normal.         Judgment: Judgment normal.         Infant:  Behaviors: Alert  Color: Pink  Birth weight: 3425 grams  Current weight: 3395 grams    Problems with infant: shallow latch at times, chokes while feeding when flow is faster      General Appearance:  Alert, active, no distress                            Head:  Normocephalic, AFOF, sutures opposed                            Eyes:   Conjunctiva clear, no drainage                            Ears:   Normally placed, no anomolies                           Nose:   No drainage or erythema                          Mouth:  No lesions. Wide gape. Tongue extends just barely to the lower lip, lateralizes more completely to the right side vs the left and only the edges curl up when crying. The tip of the tongue distorts slightly with movement and a deep groove develops down the center of the tongue. Good cupping and over time, effective peristalsis felt as she sucked. The lingual frenulum is thin, short, attached posterior to the tip of the tongue with a webbed attachment near the base of the lower alveolar ridge. Neck:  Supple, symmetrical, trachea midline                Respiratory:  No grunting, flaring, retractions, breath sounds clear and equal           Cardiovascular:  Regular rate and rhythm. No murmur. Adequate perfusion/capillary refill. Femoral pulse present                  Abdomen:    Soft, non-tender, no masses, bowel sounds present, no HSM            Genitourinary:  Normal female genitalia, anus patent                         Spine:   No abnormalities noted       Musculoskeletal:   Full range of motion         Skin/Hair/Nails:   Skin warm, dry, and intact, no rashes or abnormal dyspigmentation or lesions               Neurologic:   No abnormal movement, tone appropriate for gestational age     Latch:  Efficiency:               Lips Flanged:  Yes              Depth of latch: good              Audible Swallow: Yes, sustained SSB              Visible Milk: Yes              Wide Open/ Asymmetrical: Yes              Suck Swallow Cycle: Breathing: unlabored, Coordinated: yes  Nipple Assessment after latch: Normal: elongated/eraser, no discoloration and no damage noted. Latch Problems: Chintan needed a little support with positioning. After education, she was able to make a small adjustment and guide Junior to an effective, completely comfortable latch. She fed on both breasts and was completely content after nursing. Position:  Infant's Ergonomics/Body               Body Alignment: Yes               Head Supported: Yes               Close to Mom's body/ Lifted/ Supported: Yes               Mom's Ergonomics/Body: Yes, education                           Supported: Yes, after education                           Sitting Back: Yes                           Brings Baby to her breast: Yes, after education  Positioning Problems: Initially, Chintan leaned forward and twisted her torso to bring her nipple to Junior's mouth. She shaped her breast opposite the direction of Junior's mouth. Handouts:   Paced bottle feeding, Hands on pumping, Hand expression, and Latch Check List    Education:  Reviewed Latch: Demonstrated how to gently compress the breast and align the baby so that her nose is just above the nipple with her lower lip and chin touching the breast to encourage the deepest, widest, off-center latch. Reviewed Positioning for Dyad: Demonstrated how to position mom comfortably and supported with her baby belly to belly prior to bringing her baby to her breast.  Reviewed Infant:Cues and varied States of Awareness  Reviewed Alternative/Artificial Feedings: Discussed and demonstrated paced bottle feeding. Reviewed Mom/Breast care: Discussed appropriate handling of the breasts to avoid inflammation, pain and trauma        Plan:    Reassurance and support given. I encouraged Chintan to continue to breastfeed as often as she feels comfortable. We worked on positioning to improve her comfort and confidence. I suggested that she pump as needed to obtain milk for bottle feeding.   I recommended that she avoid expressing large volumes of surplus milk. I encouraged her to call for more support if she continues to have attachment related pain or other concerns regarding breast or nipple health or milk production issues. I have spent 90 minutes with Patient and family today in which greater than 50% of this time was spent in counseling/coordination of care regarding Instructions for management, Patient and family education, and Counseling / Coordination of care.

## 2023-12-04 NOTE — PATIENT INSTRUCTIONS
Continue breastfeeding as often as you desire. Pump if a feeding at the breast is missed. When pumping, cycle your pump through stimulation and expression mode several times in a session to stimulate several let downs until you have expressed enough milk to feed the baby and to achieve breast comfort. There is no need to "empty" the breast completely. Use gentle hands on pumping and hand expression   Maintain your pump as recommended. Use flange that fits comfortably and allows the breast to empty effectively. Please call for more support if you continue to have pain associated with feeding or any other concerns with breast or nipple health or milk production.

## 2023-12-12 RX ORDER — SERTRALINE HYDROCHLORIDE 100 MG/1
150 TABLET, FILM COATED ORAL DAILY
Qty: 135 TABLET | Refills: 1 | Status: SHIPPED | OUTPATIENT
Start: 2023-12-12

## 2023-12-20 NOTE — PROGRESS NOTES
Diagnoses and all orders for this visit:    Postpartum exam    Encounter for initial prescription of contraceptive pills  -     norethindrone (MICRONOR) 0.35 MG tablet; Take 1 tablet (0.35 mg total) by mouth daily    May start POP's today, we reviewed instructions for use, side effects     May advance activities as tolerated  May resume sexual activity at your discretion   Perineal hygiene reviewed   Weight bearing exercises minium of 150 mins/weekly advised.   Kegel exercises recommended.   Reviewed  rest, daily exercise and nutrition recommendations. Continue with PNV.  Gardisil vaccines recommended up to age 45  Advised to call with any issues,  all concerns & questions addressed.     Follow up for annual exam in 3 months   See AVS for further educational information    Floridalma Tejada is a 28 y.o.  presents for routine postpartum visit.   She is s/p vaginal  @ 39/4 on 2023 . NONE perineal lac, Baby Female , Apgar's 8@1&9@5  Prenatal and intrapartum course was complicated by   Patient Active Problem List   Diagnosis    Depression with anxiety    Insomnia    Anxiety    Family history of primary ovarian failure    Mild major depression (HCC)     (spontaneous vaginal delivery)    Low-lying placenta    Palpitations     She denies abn bleeding, pelvic pain, breast complaints, bowel/bladder dysfunction, depression/anx. Denies resumption of sexual activity  Baby is thriving. Breast feeding  feeding. EPDS score 3 Good support.     Normal postpartum exam. Lochia Alba noted/scant. No CMT or S&S of infection   She expresses interest in starting yes  for contraception. Reviewed risks/benefits, SE's/AE's and directions for use. Recommended starting this today. Advised of importance of compliance, discussed reasons to use back up and reviewed reasons to call.   Discussed risks of short interval pregnancies.     Gardasil: Completed     Past Medical History:   Diagnosis Date    Allergic rhinitis due to pollen      LAST ASSESSED [id number]    Anxiety     Depression     Has not taken medication for 6 months    Dysmenorrhea     LAST ASSESSED [id number]    Globus sensation     LAST ASSESSED [id number]    Varicella     Wrist sprain     LAST ASSESSED [id number]     Past Surgical History:   Procedure Laterality Date    NO PAST SURGERIES         Immunization History   Administered Date(s) Administered    DTaP 5 1997, 1997, 1997, 07/16/1998, 03/12/2002    HPV Quadrivalent 02/10/2014, 04/09/2014, 05/27/2014    Hep B, adult 1997, 1997, 1997, 03/15/2018    Hib (PRP-OMP) 1997, 1997, 1997, 07/16/1998    INFLUENZA 12/27/2017    IPV 1997, 1997, 06/19/1998, 03/12/2002    Influenza Quadrivalent Preservative Free 3 years and older IM 12/27/2017    MMR 06/19/1998, 03/12/2002    Meningococcal, Unknown Serogroups 09/09/2008    Tdap 09/09/2008, 12/27/2017, 10/11/2023    Varicella 07/16/1998, 09/09/2008       Family History   Problem Relation Age of Onset    Other Mother         IDIOPATHIC THROMBOCYTOPENIC PURPURA    Alcohol abuse Father     Anemia Father     No Known Problems Sister     No Known Problems Maternal Grandmother     Esophageal cancer Maternal Grandfather     Bone cancer Maternal Grandfather     Cancer Maternal Grandfather     No Known Problems Paternal Grandmother     Multiple sclerosis Paternal Grandfather      Social History     Tobacco Use    Smoking status: Never    Smokeless tobacco: Never   Vaping Use    Vaping status: Never Used   Substance Use Topics    Alcohol use: Not Currently     Alcohol/week: 1.0 standard drink of alcohol     Types: 1 Glasses of wine per week    Drug use: No         Current Outpatient Medications:     norethindrone (MICRONOR) 0.35 MG tablet, Take 1 tablet (0.35 mg total) by mouth daily, Disp: 90 tablet, Rfl: 0    Prenatal Vit-Fe Fumarate-FA (PRENATAL VITAMIN PO), Take 1 tablet by mouth in the morning, Disp: , Rfl:     sertraline (ZOLOFT) 100 mg  "tablet, Take 1.5 tablets (150 mg total) by mouth daily, Disp: 135 tablet, Rfl: 1    simethicone (MYLICON) 80 mg chewable tablet, Chew 1 tablet (80 mg total) 4 (four) times a day as needed for flatulence (Patient not taking: Reported on 2023), Disp: 30 tablet, Rfl: 0    witch hazel-glycerin (TUCKS) topical pad, Apply 1 Pad topically every 4 (four) hours as needed for irritation (Patient not taking: Reported on 2023), Disp: , Rfl: 0    Patient Active Problem List    Diagnosis Date Noted    Palpitations 10/24/2023    Low-lying placenta 2023     (spontaneous vaginal delivery) 2023    Anxiety 2021    Mild major depression (HCC) 2021    Family history of primary ovarian failure 2021    Insomnia 2016    Depression with anxiety 2015     Allergies   Allergen Reactions    Fruit C [Ascorbate - Food Allergy] GI Intolerance     Fresh Fruit- stomach pains     Pollen Extract Allergic Rhinitis       OB History    Para Term  AB Living   1 1 1 0 0 1   SAB IAB Ectopic Multiple Live Births   0 0 0 0 1      # Outcome Date GA Lbr Eros/2nd Weight Sex Delivery Anes PTL Lv   1 Term 23 39w4d / 01:05 3425 g (7 lb 8.8 oz) F Vag-Spont EPI N JIGNA       Vitals:    23 1014   BP: 124/70   BP Location: Left arm   Patient Position: Sitting   Cuff Size: Large   Weight: 85.7 kg (189 lb)   Height: 5' 5\" (1.651 m)     Body mass index is 31.45 kg/m².      Review of Systems     Constitutional: Negative for chills, fatigue, fever, headaches, visual disturbances, and unexpected weight change.   Respiratory: Negative for cough, & shortness of breath.  Cardiovascular: Negative for chest pain. .    Gastrointestinal: Negative for Abd pain, nausea & vomiting, constipation and diarrhea.   Genitourinary: Negative for difficulty urinating, dysuria, hematuria, dyspareunia, unusual vaginal bleeding or discharge  Skin: Negative skin changes    Physical Exam     Constitutional: Alert & " Oriented x3, well-developed and well-nourished. No distress.   HENT: Atraumatic, Normocephalic, Conjunctivae clear  Neck: Normal range of motion.  Pulmonary: Effort normal.   Abdominal: Soft. No tenderness or masses  Musculoskeletal: Normal ROM  Skin: Warm & Dry  Psychological: Normal mood, thought content, behavior & judgement     Breasts: Patient Reported   Right: tissue soft without  tenderness, skin changes  No areas of erythema or pain.   Left:  tissue soft without  tenderness, skin changes  No areas of erythema or pain.     Pelvic exam was performed with patient supine, lithotomy position.      Labia: Negative rash, tenderness, lesion or injury on the right labia.              Negative rash, tenderness, lesion or injury on the left labia.   Urethral meatus:  Negative for  tenderness, inflammation or discharge.   Uterus: not deviated, enlarged, fixed or tender.   Cervix: No CMT, no discharge or friability.   Right adnexa: no mass, no tenderness and no fullness.  Left adnexa: no mass, no tenderness and no fullness.   Vagina: No erythema, tenderness, masses, or foreign body in the vagina. No signs of injury around the vagina. No unusual vaginal discharge   Perineum without lesions, signs of injury, erythema or swelling.  Inguinal Canal:        Right: No inguinal adenopathy or hernia present.        Left: No inguinal adenopathy or hernia present.

## 2023-12-20 NOTE — PATIENT INSTRUCTIONS
Birth Control Pills     Birth control pills  are also called oral contraceptives, or the pill. It is medicine that helps prevent pregnancy by stopping ovulation. Ovulation is when the ovaries make and release an egg cell each month. If this egg gets fertilized by sperm, pregnancy occurs. You will need to take the pill at the same time every day. Your healthcare provider will tell you when to start taking the pill. You will also be told what to do if you miss a dose. Instructions will depend on the kind of birth control pills you are taking.   Different kinds of birth control pills:  Some kinds are taken for 21 days in a row, followed by 7 days of placebo (no hormones) pills. Other kinds are taken for 24 days followed by 4 days of placebos. Each kind has a certain amount of female hormones. Your provider will decide on the kind that is best for you based on your age and other health conditions.  Call your local emergency number (911 in the ) for any of the following:   You have any of the following signs of a stroke:      Numbness or drooping on one side of your face     Weakness in an arm or leg    Confusion or difficulty speaking    Dizziness, a severe headache, or vision loss    You feel lightheaded, short of breath, and have chest pain.    You cough up blood.    Seek care immediately if:   Your arm or leg feels warm, tender, and painful. It may look swollen and red.    You have severe pain, numbness, or swelling in your arms or legs.    Contact your healthcare provider if:   You have forgotten to take a birth control pill.    You have mood changes, such as depression, since starting birth control pills.    You have nausea or are vomiting.    You have severe abdominal pain.    You missed a period and have questions or concerns about being pregnant.    You still have bleeding 4 months after taking birth control pills correctly.    You have questions or concerns about your condition or care.    What may be done  before you can start taking birth control pills:  You need to see your healthcare provider to get a prescription. Any of the following may be done before your healthcare provider gives you a prescription:  Your healthcare provider will ask about diseases and illnesses you have had in the past. Your provider will check your risk for blood clots, heart conditions, or stroke. Tell your provider if you had gastric bypass surgery. This surgery can affect the way your body absorbs medicines such as birth control pills.    Your provider will also check your blood pressure, and may do a breast and pelvic exam. A Pap smear may also be done during the pelvic exam. This is a test to make sure you do not have abnormal changes on your cervix. You may need other tests, such as a urine test to make sure you are not pregnant.    Your provider will ask if you take any medicines and if you smoke. Smoking increases your risk for stroke, heart attack, or a blood clot in your lungs. If you smoke, you should not take certain kinds of birth control pills.    Advantages of birth control pills:  When birth control pills are used correctly, the chances of getting pregnant are very low. Birth control pills may help decrease bleeding and pain during your monthly period. They may also help prevent cancer of the uterus and ovaries.  Disadvantages of birth control pills:  You may have sudden changes in your mood or feelings while you take birth control pills. You may have nausea and a decreased sex drive. You may have an increased appetite and rapid weight gain. You may also have bleeding in between periods, less frequent periods, vaginal dryness, and breast pain. Birth control pills will not protect you from sexually transmitted infections. Rarely, some birth control pills can increase your risk for a blood clot. This may become life-threatening.  If you decide you want to get pregnant:  If you are planning to have a baby, ask your healthcare  provider when you may stop taking your birth control pills. It may take some time for you to start ovulating again. Ask your healthcare provider for more information about pregnancy after birth control pills.  When to start taking birth control pills after you have a baby:  If you are not breastfeeding, you may start taking birth control pills 3 weeks after you give birth. You may be able to take certain types of birth control pills if you are breastfeeding. These pills can be started from 6 weeks to 6 months after you give birth. Ask your healthcare provider for more information about when to start taking birth control pills after you give birth.  What you need to know about birth control pills and menopause:   Talk with your healthcare provider if you want to take birth control pills around menopause.     Around age 45, you will enter into perimenopause. This means your hormone levels are dropping and you are ovulating less often. You can still become pregnant during this time. The risk for problems, such as miscarriage, are higher if you become pregnant after age 45. Birth control pills will prevent pregnancy, and may also help prevent or relieve some signs and symptoms of menopause. Examples are hot flashes and mood swings.     Your provider will do tests when you are around age 50. The tests may show that you are in menopause. If the tests do not show menopause for sure, you may be able to continue taking the pill up to age 55. The decision will depend on your health and if you have any medical conditions, such as a blood clot.    Do not smoke:  Nicotine and other chemicals in cigarettes and cigars increase your risk for a blood clot while you use birth control pills. The risk is higher if you are also 35 or older. Ask your healthcare provider for information if you currently smoke and need help to quit. E-cigarettes or smokeless tobacco still contain nicotine. Talk to your healthcare provider before you use  these products.  Follow up with your healthcare provider as directed:  Write down your questions so you remember to ask them during your visits.  © Copyright [employer] 2020 Information is for End User's use only and may not be sold, redistributed or otherwise used for commercial purposes. All illustrations and images included in CareNotes® are the copyrighted property of [employer] or [employer]  The above information is an  only. It is not intended as medical advice for individual conditions or treatments. Talk to your doctor, nurse or pharmacist before following any medical regimen to see if it is safe and effective for you.     Vaginal Delivery   AMBULATORY CARE:   What you need to know about vaginal delivery:  A vaginal delivery occurs when your baby is born through your vagina (birth canal).   How to prepare for vaginal delivery:   You can ask someone to be with you during labor and delivery. The person can be a spouse, friend, or family member. This person can help make you more comfortable. Arrange to be able to contact the person when labor begins.    You may need tests to check for certain infections that can be passed to your baby. You may be given antibiotics to fight a bacterial infection you have or prevent an infection during delivery.    Ask if it is okay to eat while you are in labor.    Your healthcare provider can give you medicines for pain relief if you choose to have them. You may need medicine to induce (start) your labor if your labor is not moving forward. You may need to move in bed, stand, or walk to help your baby move into position for birth.    What will happen during vaginal delivery:   You can move into several positions during delivery. You can lie on your back, have your feet up in stirrups, or squat. You may feel pressure on your rectum. This pressure is caused by the movement of your baby's head down the birth canal. You may feel the urge to push.  Your healthcare provider will have you push when you feel the urge. He or she will guide your baby out of the birth canal. Forceps or suction may be used to help deliver your baby. You may also need an episiotomy (incision) to make the vaginal opening larger. This will make more room for your baby.    At least 1 minute after your baby is born, your healthcare provider will put clamps on the umbilical cord. The cord will then be cut. Your uterus will continue to contract after delivery to push out the placenta. You may be given medicine to prevent heavy bleeding when the placenta is pushed out. Your healthcare provider may close your episiotomy incision or any tears with stitches.    What will happen after vaginal delivery:   Healthcare providers will examine your baby.  Your baby may be placed on your chest right away. He or she may also start breastfeeding. Your baby will also be given vitamin K as a shot.     Healthcare providers will examine you.  Your blood pressure will be checked right after you give birth. Providers will check for vaginal bleeding, and check that your uterus is acosta. Your temperature and heart rate will be checked regularly.    You may be taken to another room to rest with your baby.  Call for a healthcare provider if you are holding your baby and start to feel tired. The provider can put him or her in a bassinet near you while you rest or sleep. This will help prevent an accidental drop or fall of your baby.    A healthcare provider may massage your abdomen several times to make your uterus firm.  This can be uncomfortable. You may have abdominal pains for up to 3 days after you give birth because your uterus is still acosta. The contractions help release blood from inside your uterus so it shrinks back to its normal size. These contractions may hurt more while you breastfeed your baby.    Your healthcare provider may suggest you get out of bed to sit in a chair or walk. Activity  can help prevent blood clots.    You may be able to go home within 24 to 48 hours after delivery.  If you need support at home, ask your healthcare provider about home visits by another healthcare provider. This healthcare provider can help you learn about breastfeeding, bottle feeding, baby care, and perineum care.    Call your doctor or obstetrician if:   Your leg feels warm, tender, and painful. It may look swollen and red.    You have a fever.    You are urinating very little, or not at all.    You have heavy vaginal bleeding that fills 1 or more sanitary pads in 1 hour.    You feel weak, dizzy, or faint.     Your abdominal or perineal pain does not go away, or gets worse.    You feel depressed.    You have questions or concerns about your condition or care.    Medicines:   NSAIDs , such as ibuprofen, help decrease swelling, pain, and fever. This medicine is available with or without a doctor's order. NSAIDs can cause stomach bleeding or kidney problems in certain people. If you take blood thinner medicine, always ask your healthcare provider if NSAIDs are safe for you. Always read the medicine label and follow directions.    Stool softeners  make it easier for you to have a bowel movement. You may need this medicine to treat or prevent constipation.    Take your medicine as directed.  Contact your healthcare provider if you think your medicine is not helping or if you have side effects. Tell your provider if you are allergic to any medicine. Keep a list of the medicines, vitamins, and herbs you take. Include the amounts, and when and why you take them. Bring the list or the pill bottles to follow-up visits. Carry your medicine list with you in case of an emergency.    Activity:  Rest as much as possible. Try to keep all activities short. You may be able to do some exercise soon after you have your baby. Talk with your healthcare provider before you start exercising. If you work outside the home, ask when you  can return to your job.  Kegel exercises:  Kegel exercises may help your vaginal and rectal muscles heal faster. You can do Kegel exercises by tightening and relaxing the muscles around your vagina. Kegel exercises help make the muscles stronger.   Breast care:  When your milk comes in, your breasts may feel full and hard. Ask how to care for your breasts, even if you are not breastfeeding.   Constipation:  You may have constipation for a period of time after you have your baby. Do not try to push the bowel movement out if it is too hard. High-fiber foods and extra liquids can help you prevent constipation. Examples of high-fiber foods are fruit and bran. Prune juice and water are good liquids to drink. You may also be told to take over-the-counter fiber and stool softener medicines. Take these items as directed. Ask how to prevent or treat hemorrhoids.  Perineum care:  Your perineum is the area between your vagina and anus. Keep the area clean and dry. This will help it heal and prevent infection. Wash the area gently with soap and water when you bathe or shower. Rinse your perineum with warm water after you urinate or have a bowel movement. A warm sitz bath can help decrease pain. To take a sitz bath, fill a bathtub with 4 to 6 inches of warm water. You may also use a sitz bath pan that fits inside the toilet. Sit in the sitz bath for 20 minutes. Do this 2 to 3 times a day, or as directed. The warm water can help decrease pain and swelling.   Vaginal discharge:  You will have vaginal discharge, called lochia, after your delivery. The lochia is red or dark brown with clots for 1 to 3 days after the birth. The amount will decrease and turn pale pink or brown for 3 to 10 days. It will turn white or yellow on the 10th or 14th day. Use a sanitary pad instead of a tampon to prevent a vaginal infection. You will have lochia for up to 3 weeks after your baby is born.   Monthly periods:  Your period may start again within  7 to 9 weeks after your baby is born. If you are breastfeeding, it may take longer for your period to start again. You can still get pregnant again even though you do not have your monthly period. Talk with your healthcare provider about a birth control method if you do not want to get pregnant.  Mood changes:  Many new mothers have some kind of mood changes after delivery. Some of these changes occur because of lack of sleep, hormone changes, and caring for a new baby. Some mood changes can be more serious, such as postpartum depression. Talk with your healthcare provider if you feel unable to care for yourself or your baby.  Sexual activity:  Do not have sex until your healthcare provider says it is okay. You may notice you have a decreased desire for sex, or sex may be painful. You may need to use a vaginal lubricant (gel) to help make sex more comfortable.  Follow up with your doctor or obstetrician as directed:  Most women need to return 6 weeks after a vaginal delivery. Ask how to care for any wounds or stitches. Write down your questions so you remember to ask them during your visits.  ©  [employer]2023 Information is for End User's use only and may not be sold, redistributed or otherwise used for commercial purposes.  The above information is an  only. It is not intended as medical advice for individual conditions or treatments. Talk to your doctor, nurse or pharmacist before following any medical regimen to see if it is safe and effective for you.  Postpartum Bleeding   AMBULATORY CARE:   What you need to know about postpartum bleeding:  Postpartum bleeding is vaginal bleeding after childbirth. This bleeding is normal, whether your baby was born vaginally or by . It contains blood and the tissue that lined the inside of your uterus when you were pregnant.   What to expect with postpartum bleeding:  Postpartum bleeding usually lasts at least 10 days, and may last longer than 6  weeks. Your bleeding may range from light (barely staining a pad) to heavy (soaking a pad in 1 hour). Usually, you have heavier bleeding right after childbirth, which slows over the next few weeks until it stops. The bleeding is red or dark brown with clots for the first 1 to 3 days. It then turns pink for several days, and then becomes a white or yellow discharge until it ends.  Call your local emergency number (911 in the ) if:   You are suddenly short of breath and feel lightheaded.    You have sudden chest pain.    You are breathing faster than normal.    Your heart is beating faster than normal.    Call your doctor or obstetrician if:   Your bleeding increases, or you have heavy bleeding that soaks a pad in 1 hour for 2 hours in a row.    You have a fever.     You pass large blood clots.    You feel dizzy.    You have a low back ache, abdominal pain or tenderness, or loss of appetite.    You are urinating less than usual, or not at all.    You have questions or concerns about your condition or care.    What to expect with postpartum bleeding:  Postpartum bleeding usually lasts at least 10 days, and may last longer than 6 weeks. Your bleeding may range from light (barely staining a pad) to heavy (soaking a pad in 1 hour). Usually, you have heavier bleeding right after childbirth, which slows over the next few weeks until it stops. The bleeding is red or dark brown with clots for the first 1 to 3 days. It then turns pink for several days, and then becomes a white or yellow discharge until it ends.  Follow up with your obstetrician as directed:  Do not have sex until your obstetrician says it is okay. Write down your questions so you remember to ask them during your visits.  © Copyright [employer] 2023 Information is for End User's use only and may not be sold, redistributed or otherwise used for commercial purposes.  The above information is an  only. It is not intended as medical advice for  individual conditions or treatments. Talk to your doctor, nurse or pharmacist before following any medical regimen to see if it is safe and effective for you.  Postpartum Depression   AMBULATORY CARE:   Postpartum depression (PPD)  is a mood disorder that causes feelings of sadness or hopelessness that do not go away. PPD occurs after your baby is born. Your symptoms may last up to 12 months after delivery. Your symptoms may become severe and affect your daily activities and relationships.  Common signs and symptoms:   Feeling sad, anxious, tearful, discouraged, hopeless, or alone    Thoughts that you are not a good mother    Trouble completing daily tasks, concentrating, or remembering things    Lack of appetite    Lack of interest in your baby    Feeling restless, irritable, or withdrawn    An overwhelmed feeling with your new baby and a belief that it will not get better    Feeling unimportant or guilty most of the time    Trouble sleeping, even after the baby is asleep    Call your local emergency number (911 in the ) if:   You think about hurting yourself or your baby.      Call your doctor if:   Your symptoms get worse or do not get better with treatment.    Your depression keeps you from doing your regular daily activities.    You have new symptoms since your last visit.    You have questions or concerns about your condition or care.    Treatment  may include any of the following:  Talk therapy  is used to help you cope with your feelings. Talk therapy can be with a therapist, counselor, or psychiatrist. Sessions may be done alone or with family.    Antidepressant medicine  may be given to decrease or manage symptoms. You may need to take this medicine for several weeks before they start working. Tell your provider about any side effects or problems you have with your medicine. The type or amount of medicine may need to be changed.    Self-care:  It may take a few weeks to feel better. Be patient with  yourself. The following can help you manage your symptoms:  Rest as needed.  Take a nap or rest while your baby sleeps. Ask someone to watch your baby while you nap.    Get emotional support.  Share your feelings with your partner, a friend, or another mother. Ask your partner, friends, or family to help with cooking or cleaning. Do only what is needed and let other things wait until later.    Exercise when you can.  Even 5 or 10 minutes of exercise can help improve your mood. Walk around the block or do some stretching exercises.         Eat a variety of healthy foods.  Healthy foods include fruits, vegetables, whole-grain breads, low-fat dairy products, beans, lean meats, and fish. Do not skip meals. Tell your healthcare provider if you have no appetite or cannot eat. Your provider can help you create a healthy meal plan.         Care for yourself.  Shower and dress yourself each day. Do things you enjoy. Celebrate small achievements, even if it is only 1 thing a day. Try to get out of the house for a short time each day. Meet a friend for coffee.    Follow up with your doctor as directed:  Your healthcare provider will monitor your progress at follow-up visits. Your provider will also monitor your medicine if you take antidepressants and ask if the medicine is helping. Tell your provider about any side effects or problems you have with your medicine. The type or amount of medicine may need to be changed. Write down your questions so you remember to ask them during your visits.  © Copyright [employer] 2023 Information is for End User's use only and may not be sold, redistributed or otherwise used for commercial purposes.  The above information is an  only. It is not intended as medical advice for individual conditions or treatments. Talk to your doctor, nurse or pharmacist before following any medical regimen to see if it is safe and effective for you.  Breastfeeding and Breast Engorgement    AMBULATORY CARE:   What you need to know about breast engorgement:  Breast engorgement develops when too much milk builds up in your breast. It is normal for your breasts to feel swollen, heavy, and tender when your milk comes in. This is called breast fullness. When your breast starts to feel painful and hard, the fullness has developed into engorgement. Breast engorgement usually happens 3 to 5 days after you give birth. Engorgement can happen if you are not breastfeeding or expressing milk often, or produce a lot of milk. Your baby may have a hard time latching on (attaching) to your breast to feed. Without treatment, engorgement can lead to plugged milk ducts or a breast infection called mastitis.   Common symptoms include the following:   A swollen, tender breast    A breast that feels hard to the touch or looks tight or shiny    Warm, red, or throbbing breast    Flat nipple    A low fever    Seek care immediately if:   You have a fever with chills or body aches.    You have pain and swelling in one or both breasts that keeps you from breastfeeding.    Contact your healthcare provider if:   You have a tender breast lump that grows slowly and usually forms on one side of your breast.    You have a small, white bump on your nipple.    Your symptoms do not get better within 24 hours.    You have questions or concerns about your condition or care.    Manage your symptoms:   Breastfeed or pump every 2 or 3 hours.  Frequent breastfeeding helps decrease engorgement discomfort. Express or pump milk from your breasts before you breastfeed. This will help soften your breast and your nipple, and allow your baby to latch on better.     Massage your breast.  Breast massage helps empty your engorged breast and decrease pain. Gently massage your breast before and during breastfeeding to help increase your milk flow. Gently stroke your breast, starting from the outer areas and working your way toward the nipple. Breast  massage may also help prevent breast engorgement if done in the first few days after you give birth.    Apply a cool compress in between feedings.  The cold may help decrease swelling and pain in your engorged breast. Wet a washcloth in cold water, wring it out, and place it on your breast. Ask how long and how often to use a cool compress.    Wear a supportive bra.  The bra should fit well but not be too tight.    Prevent breast engorgement:   Help your baby get a good latch.  Hold the nape of his or her neck to help him or her latch onto your breast. Touch his or her top lip with your nipple and wait for him or her to open his or her mouth wide. Your baby's lower lip and chin should touch the areola (dark area around the nipple) first. Help him or her get as much of the areola in his or her mouth as possible. You should feel as if your baby will not separate from your breast easily. Gently break suction and reposition if your baby is only sucking on the nipple. Talk to a lactation consultant if you need help with your baby's latch.         Empty your breasts completely.  Take your time when you breastfeed to allow your baby to empty your breast. Try not to switch breasts too early. Express or pump after you breastfeed if your baby is not emptying your breasts when he or she feeds.    Apply warmth to your breast before you breastfeed.  Put a warm, wet cloth on your breast or take a warm shower. This can help increase your milk flow.    Follow up with your doctor as directed:  Write down your questions so you remember to ask them during your visits.  For more information:   [employer]  [address] [place] [id number]  Phone: [phone]  Web Address: http://www.aap.org    [employer]  [address] [place] [address]  Phone: [phone]  Phone: [phone]  Web Address: http://www.[facility].org  © Copyright [employer] 2023 Information  is for End User's use only and may not be sold, redistributed or otherwise used for commercial purposes.  The above information is an  only. It is not intended as medical advice for individual conditions or treatments. Talk to your doctor, nurse or pharmacist before following any medical regimen to see if it is safe and effective for you.  Mastitis   AMBULATORY CARE:   Mastitis  is an infection of breast tissue that most often occurs in women who breastfeed. It can happen any time during breastfeeding, but usually occurs within the first 3 months after giving birth. Usually only one breast is affected.  Common signs and symptoms include the following:   Chills and fever    Breast swelling, redness, warmth, and tenderness     Tenderness under your arm    Fatigue and body aches    Contact your healthcare provider if:   Your symptoms do not get better within 2 days.    You have a painful lump in your breast.     You have swollen and tender lymph nodes in your armpit on the same side as the affected breast.    You have questions or concerns about your condition or care.    Treatment:  You can continue to breastfeed your baby while you are being treated for mastitis. Breastfeeding when you have mastitis may help speed your recovery. You may need any of the following:  Antibiotics  help treat or prevent a bacterial infection.    Acetaminophen  decreases pain and fever. It is available without a doctor's order. Ask how much to take and how often to take it. Follow directions. Acetaminophen can cause liver damage if not taken correctly.    NSAIDs , such as ibuprofen, help decrease swelling, pain, and fever. This medicine is available with or without a doctor's order. NSAIDs can cause stomach bleeding or kidney problems in certain people. If you take blood thinner medicine, always ask your healthcare provider if NSAIDs are safe for you. Always read the medicine label and follow directions.    Incision and  drainage  may be needed if the swelling does not go away and an abscess forms. Your healthcare provider will make a small incision in your breast to drain the pus.    Manage your symptoms:   Continue to breastfeed from the affected breast.  This will help to prevent an abscess from forming. Breastfeed your baby on the affected side first. Apply a warm, wet cloth on your breast or take a warm shower before you feed your baby. This can help increase your milk flow. If it is painful when you breastfeed from the affected breast, feed your baby from the other breast first. Pump the affected side to completely drain your breast after breastfeeding, if needed. You may save the pumped milk to feed your baby.     Use different positions to breastfeed.  Change the position of your baby during feedings. This may help to relieve your discomfort.     Apply heat on your breast for 20 to 30 minutes every 2 hours for as many days as directed.  Heat helps decrease pain.     Apply ice after feedings.  Apply ice on your breast for 15 to 20 minutes every hour or as directed. Use an ice pack, or put crushed ice in a plastic bag. Cover it with a towel. Ice helps prevent tissue damage and decreases swelling and pain.    Massage your breast.  Gently massage your breast before and during breastfeeding to help drain your milk.     Drink liquids as directed.  Ask how much liquid to drink each day and which liquids are best for you.     Rest as needed.  Do not sleep on your stomach until your infection is gone.    Prevent mastitis:   Breastfeed every 2 or 3 hours to prevent engorgement.  Breast engorgement develops when too much milk builds up in your breast. Take your time when you breastfeed to allow your baby to empty your breast. Try not to switch breasts too early. Express or pump after you breastfeed if your baby is not emptying your breasts when he or she feeds.    Prevent sore and cracked nipples.  A good latch prevents sore and  cracked nipples. If you have sore nipples after breastfeeding, your baby may not be latched on properly. Gently break suction and reposition if your baby is only sucking on the nipple. Talk to a lactation consultant if you need help with your baby's latch.     Care for your breasts.  Keep your nipples clean and dry between feedings. Check them for cracks, blisters, or other irritated areas. Ask a lactation specialist or your healthcare provider how to treat sore and cracked nipples. Wash your hands before and after you breastfeed your baby or pump your breasts. Wear a comfortable nursing bra that supports your breasts but is not too tight.    Follow up with your doctor as directed:  Write down your questions so you remember to ask them during your visits.  © Copyright [employer] 2023 Information is for End User's use only and may not be sold, redistributed or otherwise used for commercial purposes.  The above information is an  only. It is not intended as medical advice for individual conditions or treatments. Talk to your doctor, nurse or pharmacist before following any medical regimen to see if it is safe and effective for you.  Caring for Your Baby   WHAT YOU NEED TO KNOW:   Care for your baby includes keeping him or her safe, clean, and comfortable. Your baby will cry or make noises to let you know when he or she needs something. You will learn to tell what your baby needs by the way he or she cries. Your baby will move in certain ways when he or she needs something, such as sucking on a fist when hungry.  DISCHARGE INSTRUCTIONS:   Call your local emergency number (911 in the [place]) if:   You feel like hurting your baby.       Call your baby's pediatrician if:   Your baby's abdomen is hard and swollen, even when he or she is calm and resting.    You feel depressed and cannot take care of your baby.    Your baby's lips or mouth are blue and he or she is breathing faster than usual.    Your baby's armpit  temperature is higher than 99°F (37.2°C).    Your baby's eyes are red, swollen, or draining yellow pus.    Your baby coughs often during the day, or chokes during each feeding.    Your baby does not want to eat.    Your baby cries more than usual and you cannot calm him or her down.    Your baby's skin turns yellow or he or she has a rash.    You have questions or concerns about caring for your baby.    What to feed your baby:   Breast milk is the only food your baby needs for the first 6 months of life.  If possible, only breastfeed (no formula) him or her for the first 6 months. Breastfeeding is recommended for at least the first year of your baby's life, even when he or she starts eating food. You may pump your breasts and feed breast milk from a bottle. You may feed your baby formula from a bottle if breastfeeding is not possible. Talk to your baby's pediatrician about the best formula for your baby. He or she can help you choose one that contains iron.    Do not add cereal to the milk or formula.  Your baby may get too many calories during a feeding. You can make more if your baby is still hungry after he or she finishes a bottle.    How much to feed your baby:   Your baby may want different amounts each day.  The amount of formula or breast milk your baby drinks may change with each feeding and each day. The amount your baby drinks depends on his or her weight, how fast he or she is growing, and how hungry he or she is. Your baby may want to drink a lot one day and not want to drink much the next.     Do not overfeed your baby.  Overfeeding means your baby gets too many calories during a feeding. This may cause him or her to gain weight too fast. Your baby may also continue to overeat later in life. Look for signs that your baby is done feeding. Your baby may look around instead of watching you. He or she may chew on the nipple of the bottle rather than suck on it. He or she may also cry and try to wriggle  away from the bottle or out of the high chair.    Feed your baby each time he or she is hungry:      Babies up to 2 months old  will drink about 2 to 4 ounces at each feeding. He or she will probably want to drink every 3 to 4 hours. Wake your baby to feed him or her if he or she sleeps longer than 4 to 5 hours.    Babies 2 to 6 months old  should drink 4 to 5 bottles each day. He or she will drink 4 to 6 ounces at each feeding. When your baby is 2 to 3 months old, he or she may begin to sleep through the night. When this happens, you may stop waking up to give your baby formula or breast milk in the night. If you are giving your baby breast milk, you may still need to wake up to pump your breasts. Store the milk for your baby to drink at a later time.     Babies 6 to 12 months old  should drink 3 to 5 bottles every day. He or she may drink up to 8 ounces at each feeding. You may increase the time between feedings if your baby is not hungry. You may also start to feed your baby foods at 6 months. Ask your child's pediatrician for more information about the right foods to feed your baby.    How to help your baby latch on correctly for breastfeeding:  Help your baby move his or her head to reach your breast. Hold the nape of his or her neck to help him or her latch onto your breast. Touch his or her top lip with your nipple and wait for him or her to open his or her mouth wide. Your baby's lower lip and chin should touch the areola (dark area around the nipple) first. Help him or her get as much of the areola in his or her mouth as possible. You should feel as if your baby will not separate from your breast easily. A correct latch helps your baby get the right amount of milk at each feeding. Allow your baby to breastfeed for as long as he or she is able.        Signs of correct latch-on:   You can hear your baby swallow.    Your baby is relaxed and takes slow, deep mouthfuls.    Your breast or nipple does not hurt  during breastfeeding.    Your baby is able to suckle milk right away after he or she latches on.    Your nipple is the same shape when your baby is done breastfeeding.    Your breast is smooth, with no wrinkles or dimples where your baby is latched on.    Feed your baby safely:   Hold your baby upright to feed him or her.  Do not prop your baby's bottle. Your baby could choke while you are not watching, especially in a moving vehicle.    Do not use a microwave to heat your baby's bottle.  The milk or formula will not heat evenly and will have spots that are very hot. Your baby's face or mouth could be burned. You can warm the milk or formula quickly by placing the bottle in a pot of warm water for a few minutes.    How to burp your baby:  Burp your baby when you switch breasts or after every 2 to 3 ounces from a bottle. Burp him or her again when he or she is finished eating. Your baby may spit up when he or she burps. This is normal. Hold your baby in any of the following positions to help him or her burp:  Hold your baby against your chest or shoulder.  Support his or her bottom with one hand. Use your other hand to pat or rub his or her back gently.     Sit your baby upright on your lap.  Use one hand to support his or her chest and head. Use the other hand to pat or rub his or her back.     Place your baby across your lap.  He or she should face down with his or her head, chest, and belly resting on your lap. Hold him or her securely with one hand and use your other hand to rub or pat his or her back.    How to change your baby's diaper:  Never leave your baby alone when you change his or her diaper. If you need to leave the room, put the diaper back on and take your baby with you. Wash your hands before and after you change your baby's diaper.  Put a blanket or changing pad on a safe surface.  Lay your baby down on the blanket or pad.    Remove the dirty diaper and clean your baby's bottom.  If your baby had a  bowel movement, use the diaper to wipe off most of the bowel movement. Clean your baby's bottom with a wet washcloth or diaper wipe. Do not use diaper wipes if your baby has a rash or circumcision that has not yet healed. Gently lift both legs and wash the buttocks. Always wipe from front to back. Clean under all skin folds and between creases. Apply ointment or petroleum jelly as directed if your baby has a rash.    Put on a clean diaper.  Lift both your baby's legs and slide the clean diaper beneath his or her buttocks. Gently direct your baby boy's penis down as the diaper is put on. Fold the diaper down if your baby's umbilical cord has not fallen off.    How to care for your baby's skin:  Sponge bathe your baby with warm water and a cleanser made for a baby's skin. Do not use baby oil, creams, or ointments. These may irritate your baby's skin or make skin problems worse. Ask for more information on sponge bathing your baby.     Fontanelles  (soft spots) on your baby's head are usually flat. They may bulge when your baby cries or strains. It is normal to see and feel a pulse beating under a soft spot. It is okay to touch and wash your baby's soft spots.    Skin peeling  is common in babies who are born after their due date. Peeling does not mean that your baby's skin is too dry. You do not need to put lotions or oils on your 's skin to stop the peeling or to treat rashes.    Bumps, a rash, or acne  may appear about 3 days to 5 weeks after birth. Bumps may be white or yellow. Your baby's cheeks may feel rough and may be covered with a red, oily rash. Do not squeeze or scrub the skin. When your baby is 1 to 2 months old, his or her skin pores will begin to naturally open. When this happens, the skin problems will go away.    A lip callus (thickened skin)  may form on your baby's upper lip during the first month. It is caused by sucking and should go away within the first year. This callus does not bother  your baby, so you do not need to remove it.  How to clean your baby's ears and nose:   Use a wet washcloth or cotton ball  to clean the outer part of your baby's ears. Do not put cotton swabs into your baby's ears. These can hurt his or her ears and push earwax in. Earwax should come out of your baby's ear on its own. Talk to your baby's pediatrician if you think your baby has too much earwax.    Use a rubber bulb syringe  to suction your baby's nose if he or she is stuffed up. Point the bulb syringe away from his or her face and squeeze the bulb to create a vacuum. Gently put the tip into one of your baby's nostrils. Close the other nostril with your fingers. Release the bulb so that it sucks out the mucus. Repeat if necessary. Boil the syringe for 10 minutes after each use. Do not put your fingers or cotton swabs into your baby's nose.       How to care for your baby's eyes:  A  baby's eyes usually make just enough tears to keep his or her eyes wet. By 7 to 8 months old, your baby's eyes will develop so they can make more tears. Tears drain into small ducts at the inside corners of each eye. A blocked tear duct is common in newborns. A possible sign of a blocked tear duct is a yellow sticky discharge in one or both of your baby's eyes. Your baby's pediatrician may show you how to massage your baby's tear ducts to unplug them.  How to care for your baby's fingernails and toenails:  Your baby's fingernails are soft, and they grow quickly. You may need to trim them with baby nail clippers 1 or 2 times each week. Be careful not to cut too closely to the skin because you may cut the skin and cause bleeding. It may be easier to cut your baby's fingernails when he or she is asleep. Your baby's toenails may grow much slower. They may be soft and deeply set into each toe. You will not need to trim them as often.  How to care for your baby's umbilical cord stump:  Your baby's umbilical cord stump will dry and fall  off in about 7 to 21 days, leaving a belly button. If your baby's stump gets dirty from urine or bowel movement, wash it off right away with water. Gently pat the stump dry. This will help prevent infection around your baby's cord stump. Fold the front of the diaper down below the cord stump to let it air dry. Do not cover or pull at the cord stump.       How to care for your baby boy's circumcision:  Your baby's penis may have a plastic ring that will come off within 8 days. His penis may be covered with gauze and petroleum jelly. Keep your baby's penis as clean as possible. Clean it with warm water only. Gently blot or squeeze the water from a wet cloth or cotton ball onto the penis. Do not use soap or diaper wipes to clean the circumcision area. This could sting or irritate your baby's penis. Your baby's penis should heal in about 7 to 10 days.  What to do when your baby cries:  Your baby may cry because he or she is hungry. He or she may have a wet diaper, or be hot or cold. He or she may cry for no reason you can find. It can be hard to listen to your baby cry and not be able to calm him or her down. Ask for help and take a break if you feel stressed or overwhelmed. Never shake your baby to try to stop his or her crying. This can cause blindness or brain damage. The following may help comfort your baby:  Hold your baby skin to skin and rock him or her, or swaddle him or her in a soft blanket.         Gently pat your baby's back or chest. Stroke or rub his or her head.    Quietly sing or talk to your baby, or play soft, soothing music.    Put your baby in his or her car seat and take him or her for a drive, or go for a stroller ride.    Burp your baby to get rid of extra gas.    Give your baby a soothing, warm bath.    How to keep your baby safe when he or she sleeps:   Always lay your baby on his or her back to sleep. This position can help reduce your baby's risk for sudden infant death syndrome (SIDS).          Keep the room at a temperature that is comfortable for an adult. Do not let the room get too hot or cold.    Use a crib or bassinet that has firm sides. Do not let your baby sleep on a soft surface such as a waterbed or couch. He or she could suffocate if his or her face gets caught in a soft surface. Use a firm, flat mattress. Cover the mattress with a fitted sheet that is made especially for the type of mattress you are using.    Remove all objects, such as toys, pillows, or blankets, from your baby's bed while he or she sleeps. Ask for more information on childproofing.    How to keep your baby safe in the car:   Always buckle your baby into a child safety seat.  A child safety seat is a padded seat that secures infants and children while they ride in a car. Every child safety seat has age, height, and weight ranges. Keep using the safety seat until your child reaches the maximum of the range. Then he or she is ready for the child safety seat that is the next size up. Only use child safety seats. Do not use a toy chair or prop your child on books or other objects. Make sure you have a safety seat that meets safety standards.         Place your child safety seat in the middle of the back seat.  The safety seat should not move more than 1 inch in any direction after you secure it. Always follow the instructions provided to help you position the safety seat. The instructions will also guide you on how to secure your child properly.    Make sure the child safety seat has a harness and clip.  The harness is made of straps that go over your child's shoulders. The straps connect to a buckle that rests over your child's abdomen. These straps keep your child in the seat during an accident. Another strap comes up from the bottom of the seat and connects to the buckle between your child's legs. This strap keeps your child from slipping out of the seat. Slide the clip up and down the shoulder straps to make them  tighter or looser. You should be able to slip a finger between your child and the strap.    Follow up with your baby's pediatrician as directed:  Write down your questions so you remember to ask them during your visits.  ©  [employer] Information is for End User's use only and may not be sold, redistributed or otherwise used for commercial purposes.  The above information is an  only. It is not intended as medical advice for individual conditions or treatments. Talk to your doctor, nurse or pharmacist before following any medical regimen to see if it is safe and effective for you.  Self Care After Delivery   AMBULATORY CARE:   The postpartum period  is the period of time from delivery to about 6 weeks. During this time you may experience many physical and emotional changes. It is important to understand what is normal and when you need to call your healthcare provider. It is also important to know how to care for yourself during this time.  Call your local emergency number (911 in the ) for any of the following:   You see or hear things that are not there, or have thoughts of harming yourself or your baby.    You soak through 1 pad in 15 minutes, have blurry vision, clammy or pale skin, and feel faint.    You faint or lose consciousness.    You have trouble breathing.    You cough up blood.    Your  incision comes apart.    Seek care immediately if:   Your heart is beating faster than usual.    You have a bad headache or changes in your vision.    Your episiotomy or  incision is red, swollen, bleeding, or draining pus.    You have severe abdominal pain.    Call your doctor or obstetrician if:   Your leg is painful, red, and larger than usual.    You soak through 1 or more pads in an hour, or pass blood clots larger than a quarter from your vagina.    You have a fever.    You have new or worsening pain in your abdomen or vagina.    You continue to have depression 1 to 2  weeks after you deliver.    You have trouble sleeping.    You have foul-smelling discharge from your vagina.    You have pain or burning when you urinate.    You do not have a bowel movement for 3 days or more.    You have nausea or are vomiting.    You have hard lumps or red streaks over your breasts.    You have cracked nipples or bleed from your nipples.    You have questions or concerns about your condition or care.    Physical changes:  The following are normal changes after you give birth:  Pain in the area between your anus and vagina    Breast pain    Constipation or hemorrhoids    Hot or cold flashes    Vaginal bleeding or discharge    Mild to moderate abdominal cramping    Difficulty controlling bowel movements or urine    Emotional changes:  A drop in hormone levels after you deliver may cause changes in your emotions. You may feel irritable, sad, or anxious. You may cry easily or for no reason. You may also feel depressed. Depression that continues can be a sign of postpartum depression, a condition that can be treated. Treatment may include talk therapy, medicines, or both. Healthcare providers will ask how you are feeling and if you have any depression. These talks can happen during appointments for your medical care and for your baby's care, such as well child visits. Providers can help you find ways to care for yourself and your baby. Talk to your providers about the following:  When emotional changes or depression started, and if it is getting worse over time    Problems you are having with daily activities, sleep, or caring for your baby    If anything makes you feel worse, or makes you feel better    Feeling that you are not bonding with your baby the way you want    Any problems your baby has with sleeping or feeding    Your baby is fussy or cries a lot    Support you have from friends, family, or others    Breast care for breastfeeding mothers:  You may have sore breasts for 3 to 6 days after  you give birth. This happens as your milk begins to fill your breasts. You may also have sore breasts if you do not breastfeed frequently. Do the following to care for your breasts:  Apply a moist, warm, compress to your breast as directed.  This may help soothe your breasts. Make sure the washcloth is not too hot before you apply it to your breast.    Nurse your baby or pump your milk frequently.  This may prevent clogged milk ducts. Ask your healthcare provider how often to nurse or pump.    Massage your breasts as directed.  This may help increase your milk flow. Gently rub your breasts in a circular motion before you breastfeed. You may need to gently squeeze your breast or nipple to help release milk. You can also use a breast pump to help release milk from your breast.    Wash your breasts with warm water only.  Do not put soap on your nipples. Soap may cause your nipples to become dry.    Apply lanolin cream to your nipples as directed.  Lanolin cream may add moisture to your skin and prevent nipple dryness. Always  wash off lanolin cream with warm water before you breastfeed.    Place pads in your bra.  Your nipples may leak milk when you are not breastfeeding. You can place pads inside of your bra to help prevent leaking onto your clothing. Ask your healthcare provider where to purchase bra pads.    Get breastfeeding support if needed.  Healthcare providers can answer questions about breastfeeding and provide you with support. Ask your healthcare provider who you can contact if you need breastfeeding support.    Breast care for non-breastfeeding mothers:  Milk will fill your breasts even if you bottle feed your baby. Do the following to help stop your milk from filling your breasts and causing pain:  Wear a bra with support at all times.  A sports bra or a tight-fitting bra will help stop your milk from coming in.    Apply ice on each breast for 15 to 20 minutes every hour or as directed.  Use an ice pack,  or put crushed ice in a plastic bag. Cover it with a towel before you apply it to your breast. Ice helps your milk ducts shrink.    Keep your breasts away from warm water.  Warm water will make it easier for milk to fill your breasts. Stand with your breasts away from warm water in the shower.    Limit how much you touch your breasts.  This will prevent them from filling with milk.    Perineum care:  Your perineum is the area between your rectum and vagina. It is normal to have swelling and pain in this area after you give birth. If you had an episiotomy, your healthcare provider may give you special instructions.  Clean your perineum after you use the bathroom.  This may prevent infection and help with healing. Use a spray bottle with warm water to clean your perineum. You may also gently spray warm water against your perineum when you urinate. Always wipe front to back.    Take a sitz bath as directed.  A sitz bath may help relieve swelling and pain. Fill your bath tub or bucket with water up to your hips and sit in the water. Use cold water for 2 days after you deliver. Then use warm water. Ask your healthcare provider for more information about a sitz bath.    Apply ice packs for the first 24 hours or as directed.  Use a plastic glove filled with ice or buy an ice pack. Wrap the ice pack or plastic glove in a small towel or wash cloth. Place the ice pack on your perineum for 20 minutes at a time.    Sit on a donut-shaped pillow.  This may relieve pressure on your perineum when you sit.    Use wipes that contain medicine or take pills as directed.  Your healthcare provider may tell you to use witch hazel pads. You can place witch hazel pads in the refrigerator before you apply them to your perineum. Your provider may also tell you to take NSAIDs. Ask him or her how often to take pills or use the wipes.    Do not go swimming or take tub baths for 4 to 6 weeks or as directed.  This will help prevent an infection in  your vagina or uterus.    Bowel and bladder care:  It may take 3 to 5 days to have a bowel movement after you deliver your baby. You can do the following to prevent or manage constipation, and get control of your bowel or bladder:  Take stool softeners as directed.  A stool softener is medicine that will make your bowel movements softer. This may prevent or relieve constipation. A stool softener may also make bowel movements less painful.    Drink plenty of liquids.  Ask how much liquid to drink each day and which liquids are best for you. Liquids may help prevent constipation.    Eat foods high in fiber.  Examples include fruits, vegetables, grains, beans, and lentils. Ask your healthcare provider how much fiber you need each day. Fiber may prevent constipation.         Do Kegel exercises as directed.  Kegel exercises will help strengthen the muscles that control bowel movements and urination. Ask your healthcare provider for more information on Kegel exercises.    Apply cold compresses or medicine to hemorrhoids as directed.  This may relieve swelling and pain. Your healthcare provider may tell you to apply ice or wipes that contain medicine to your hemorrhoids. He or she may also tell you to use a sitz bath. Ask your provider for more information on how to manage hemorrhoids.    Nutrition:  Good nutrition is important in the postpartum period. It will help you return to a healthy weight, increase your energy levels, and prevent constipation. It will also help you get enough nutrients and calories if you are going to breastfeed your baby.  Eat a variety of healthy foods.  Healthy foods include fruits, vegetables, whole-grain breads, low-fat dairy products, beans, lean meats, and fish. You may need 500 to 700 extra calories each day if you breastfeed your baby. You may also need extra protein.         Limit foods with added sugar and high amounts of fat.  These foods are high in calories and low in healthy  nutrients. Read food labels so you know how much sugar and fat is in the food you want to eat.    Drink 8 to 10 glasses of water per day.  Water will help you make plenty of milk for your baby. It will also help prevent constipation. Drink a glass of water every time you breastfeed your baby.    Take vitamins as directed.  Ask your healthcare provider what vitamins you need.    Limit caffeine and alcohol if you are breastfeeding.  Caffeine and alcohol can get into your breast milk. Caffeine and alcohol can make your baby fussy. They can also interfere with your baby's sleep. Ask your healthcare provider if you can drink alcohol or caffeine.    Rest and sleep:  You may feel very tired in the postpartum period. Enough sleep will help you heal and give you energy to care for your baby. The following may help you get sleep and rest:  Nap when your baby naps.  Your baby may nap several times during the day. Get rest during this time.    Limit visitors.  Many people may want to see you and your baby. Ask friends or family to visit on different days. This will give you time to rest.    Do not plan too much for one day.  Put off household chores so that you have time to rest. Gradually do more each day.    Ask for help from family, friends, or neighbors.  Ask them to help you with laundry, cleaning, or errands. Also ask someone to watch the baby while you take a nap or relax. Ask your partner to help with the care of your baby. Pump some of your breast milk so your partner can feed your baby during the night.    Exercise after delivery:  Wait until your healthcare provider says it is okay to exercise. Exercise can help you lose weight, increase your energy levels, and manage your mood. It can also prevent constipation and blood clots. Start with gentle exercises such as walking. Do more as you have more energy. You may need to avoid abdominal exercises for 1 to 2 weeks after you deliver. Talk to your healthcare provider  about an exercise plan that is right for you.       Sexual activity after delivery:   Do not have sex until your healthcare provider says it is okay. You may need to wait 4 to 6 weeks before you have sex. This may prevent infection and allow time to heal.    Your menstrual cycle may begin as soon as 3 weeks after you deliver. Your period may be delayed if you breastfeed your baby. You can become pregnant before you get your first postpartum period. Talk to your healthcare provider about birth control that is right for you. Some types of birth control are not safe during breastfeeding.    For support and more information:  Join a support group for new mothers. Ask for help from family and friends with chores, errands, and care of your baby.  Office of Women's Health,  Department of Health and Human Services  [address] [place] [address]  [address] [place] [address]  Phone: [phone]  Web Address: www.womenshealth.gov  [facility]  [address] [place] [address]  Web Address: [email or web address]  Follow up with your doctor or obstetrician as directed:  You will need to follow up within 2 to 6 weeks of delivery. Write down your questions so you remember to ask them at your visits.  © Copyright [employer] 2023 Information is for End User's use only and may not be sold, redistributed or otherwise used for commercial purposes.  The above information is an  only. It is not intended as medical advice for individual conditions or treatments. Talk to your doctor, nurse or pharmacist before following any medical regimen to see if it is safe and effective for you.

## 2023-12-21 ENCOUNTER — SOCIAL WORK (OUTPATIENT)
Dept: BEHAVIORAL/MENTAL HEALTH CLINIC | Facility: CLINIC | Age: 28
End: 2023-12-21
Payer: COMMERCIAL

## 2023-12-21 ENCOUNTER — POSTPARTUM VISIT (OUTPATIENT)
Dept: OBGYN CLINIC | Facility: CLINIC | Age: 28
End: 2023-12-21
Payer: COMMERCIAL

## 2023-12-21 VITALS
SYSTOLIC BLOOD PRESSURE: 124 MMHG | BODY MASS INDEX: 31.49 KG/M2 | DIASTOLIC BLOOD PRESSURE: 70 MMHG | HEIGHT: 65 IN | WEIGHT: 189 LBS

## 2023-12-21 DIAGNOSIS — Z30.011 ENCOUNTER FOR INITIAL PRESCRIPTION OF CONTRACEPTIVE PILLS: ICD-10-CM

## 2023-12-21 DIAGNOSIS — F41.8 DEPRESSION WITH ANXIETY: Primary | ICD-10-CM

## 2023-12-21 PROBLEM — Z37.9 NORMAL LABOR: Status: RESOLVED | Noted: 2023-11-25 | Resolved: 2023-12-21

## 2023-12-21 PROCEDURE — 90834 PSYTX W PT 45 MINUTES: CPT | Performed by: COUNSELOR

## 2023-12-21 RX ORDER — ACETAMINOPHEN AND CODEINE PHOSPHATE 120; 12 MG/5ML; MG/5ML
1 SOLUTION ORAL DAILY
Qty: 90 TABLET | Refills: 0 | Status: SHIPPED | OUTPATIENT
Start: 2023-12-21

## 2023-12-21 NOTE — PSYCH
"Behavioral Health Psychotherapy Progress Note    Psychotherapy Provided: Individual Psychotherapy     1. Depression with anxiety            Goals addressed in session: Goal 1     DATA: Floridalma arrived for her individual session today. [facility] session. She expressed that she had her baby and she is doing well. She expressed some changes in her mood and will often cry if her daughter cries, but overall she feels she's doing well managing her depression and anxiety. She expressed that for 2 days she felt very sad for no reason and couldn't understand why. She expressed that her  got the firefighting job and she's unsure what will happen as he just found out today and needs to decide within a week. We discussed pros and cons to him taking the job and this provider supported her in a how she can have a conversation with her .   During this session, this clinician used the following therapeutic modalities: Client-centered Therapy and Supportive Psychotherapy    Substance Abuse was not addressed during this session. If the client is diagnosed with a co-occurring substance use disorder, please indicate any changes in the frequency or amount of use: n/a. Stage of change for addressing substance use diagnoses: No substance use/Not applicable    ASSESSMENT:  Floridalma Tejada presents with a Euthymic/ normal mood.     her affect is Normal range and intensity, which is congruent, with her mood and the content of the session. The client has made progress on their goals.    Floridalma wasn't focused on one specific topic today and was able to process through many different things happening in her life.  Floridalma Tejada presents with a none risk of suicide, none risk of self-harm, and none risk of harm to others.    For any risk assessment that surpasses a \"low\" rating, a safety plan must be developed.    A safety plan was indicated: no  If yes, describe in detail " n/a    PLAN: Between sessions, Floridalma Tejada will continue managing her symptoms postpartum. At the next session, the therapist will use Client-centered Therapy and Supportive Psychotherapy to address her anxiety and depression.    Behavioral Health Treatment Plan and Discharge Planning: Floridalma Tejada is aware of and agrees to continue to work on their treatment plan. They have identified and are working toward their discharge goals. yes    Visit start and stop times:    12/21/23  Start Time: 1605  Stop Time: 1650  Total Visit Time: 45 minutes

## 2024-01-04 ENCOUNTER — SOCIAL WORK (OUTPATIENT)
Dept: BEHAVIORAL/MENTAL HEALTH CLINIC | Facility: CLINIC | Age: 29
End: 2024-01-04

## 2024-01-04 DIAGNOSIS — F41.8 DEPRESSION WITH ANXIETY: Primary | ICD-10-CM

## 2024-01-04 PROCEDURE — 90834 PSYTX W PT 45 MINUTES: CPT | Performed by: COUNSELOR

## 2024-01-04 NOTE — PSYCH
Behavioral Health Psychotherapy Progress Note    Psychotherapy Provided: Individual Psychotherapy     1. Depression with anxiety            Goals addressed in session: Goal 1     DATA: Floridalma arrived for her individual session today. She expressed that her  took the firefighting job and she's trying to work through processing it. She feels this isn't the best decision for the family, but doesn't want to tell him no. She identified that he often comes home from work and is on the phone. He will offer to help with the baby, but struggles to make a connect with her. Floridalma expressed that she really doesn't want to leave her baby and go back to work. She discussed how she would like to work hybrid and is going to speak to her boss about this. She identified that she doesn't feel he will go for this and the other option would be to drop down part time. We practiced different ways that Floridalma could address this with her boss and how she can advocate for herself.   During this session, this clinician used the following therapeutic modalities: Client-centered Therapy and Supportive Psychotherapy    Substance Abuse was not addressed during this session. If the client is diagnosed with a co-occurring substance use disorder, please indicate any changes in the frequency or amount of use: n/a. Stage of change for addressing substance use diagnoses: No substance use/Not applicable    ASSESSMENT:  Floridalma Tejada presents with a Euthymic/ normal mood.     her affect is Normal range and intensity, which is congruent, with her mood and the content of the session. The client has made progress on their goals.    Floridalma seems to be in positive spirits being a few months post partum. She appeared calmer then in past sessions and has been able to recognize certain things she can control.  Floridalma Tejada presents with a none risk of suicide, none risk of self-harm, and none risk of harm to others.    For any risk  "assessment that surpasses a \"low\" rating, a safety plan must be developed.    A safety plan was indicated: no  If yes, describe in detail n/a    PLAN: Between sessions, Floridalma Tejada will continue utilzing her coping skills and focus on the things she can control. At the next session, the therapist will use Client-centered Therapy and Supportive Psychotherapy to address her anxiety and depression.    Behavioral Health Treatment Plan and Discharge Planning: Floridalma Tejada is aware of and agrees to continue to work on their treatment plan. They have identified and are working toward their discharge goals. yes    Visit start and stop times:    01/04/24  Start Time: 1007  Stop Time: 1050  Total Visit Time: 43 minutes  "

## 2024-01-11 DIAGNOSIS — Z30.011 ENCOUNTER FOR INITIAL PRESCRIPTION OF CONTRACEPTIVE PILLS: ICD-10-CM

## 2024-01-11 DIAGNOSIS — F41.8 DEPRESSION WITH ANXIETY: ICD-10-CM

## 2024-01-11 RX ORDER — ACETAMINOPHEN AND CODEINE PHOSPHATE 120; 12 MG/5ML; MG/5ML
1 SOLUTION ORAL DAILY
Qty: 90 TABLET | Refills: 0 | Status: CANCELLED | OUTPATIENT
Start: 2024-01-11

## 2024-01-11 RX ORDER — SERTRALINE HYDROCHLORIDE 100 MG/1
150 TABLET, FILM COATED ORAL DAILY
Qty: 135 TABLET | Refills: 0 | Status: SHIPPED | OUTPATIENT
Start: 2024-01-11

## 2024-01-11 RX ORDER — ACETAMINOPHEN AND CODEINE PHOSPHATE 120; 12 MG/5ML; MG/5ML
1 SOLUTION ORAL DAILY
Qty: 90 TABLET | Refills: 0 | Status: SHIPPED | OUTPATIENT
Start: 2024-01-11

## 2024-01-18 ENCOUNTER — SOCIAL WORK (OUTPATIENT)
Dept: BEHAVIORAL/MENTAL HEALTH CLINIC | Facility: CLINIC | Age: 29
End: 2024-01-18
Payer: COMMERCIAL

## 2024-01-18 DIAGNOSIS — F41.8 DEPRESSION WITH ANXIETY: Primary | ICD-10-CM

## 2024-01-18 PROCEDURE — 90834 PSYTX W PT 45 MINUTES: CPT | Performed by: COUNSELOR

## 2024-01-18 NOTE — PSYCH
Behavioral Health Psychotherapy Progress Note    Psychotherapy Provided: Individual Psychotherapy     1. Depression with anxiety            Goals addressed in session: Goal 1     DATA: Floridalma arrived for her individual session today. She gave consent for [facility]) to shadow the session. Floridalma shared that her  has decided to decline the firefighting job and stay at his current job. She expressed feeling excited that he will be home more and that he has made this decision for himself. Floridalma expressed that she's going back to work in 2 weeks and is having a difficult time thinking about this. She identified that she was talking with her boss and he's going to let her work 2 days remote, but still 50 hours. She reported that she will see how the first month goes and decision what she would like to do moving forward. She reported that she would like to try working 40 hours a week, but recognizes that she won't be able to work from home moving forward.   During this session, this clinician used the following therapeutic modalities: Client-centered Therapy and Supportive Psychotherapy    Substance Abuse was not addressed during this session. If the client is diagnosed with a co-occurring substance use disorder, please indicate any changes in the frequency or amount of use: n/as. Stage of change for addressing substance use diagnoses: No substance use/Not applicable    ASSESSMENT:  Floridalma Tejada presents with a Euthymic/ normal mood.     her affect is Normal range and intensity, which is congruent, with her mood and the content of the session. The client has made progress on their goals.    Floridalma was open to process through her emotions regarding her  changing his mind about jobs. She appeared a little more anxious then usual as she's worried about going back to work and leaving her daughter. Floridalma Tejada presents with a none risk of suicide, none risk of self-harm, and none risk of harm  "to others.    For any risk assessment that surpasses a \"low\" rating, a safety plan must be developed.    A safety plan was indicated: no  If yes, describe in detail n/a    PLAN: Between sessions, Floridalma Tejada will continue utilizing her skills. At the next session, the therapist will use Client-centered Therapy and Supportive Psychotherapy to address her anxiety and depression.    Behavioral Health Treatment Plan and Discharge Planning: Floridalma Tejada is aware of and agrees to continue to work on their treatment plan. They have identified and are working toward their discharge goals. yes    Visit start and stop times:    01/18/24  Start Time: 1007  Stop Time: 1050  Total Visit Time: 43 minutes  "

## 2024-02-01 ENCOUNTER — SOCIAL WORK (OUTPATIENT)
Dept: BEHAVIORAL/MENTAL HEALTH CLINIC | Facility: CLINIC | Age: 29
End: 2024-02-01
Payer: COMMERCIAL

## 2024-02-01 DIAGNOSIS — F41.8 DEPRESSION WITH ANXIETY: Primary | ICD-10-CM

## 2024-02-01 PROCEDURE — 90834 PSYTX W PT 45 MINUTES: CPT | Performed by: COUNSELOR

## 2024-02-01 NOTE — PSYCH
"Behavioral Health Psychotherapy Progress Note    Psychotherapy Provided: Individual Psychotherapy     1. Depression with anxiety            Goals addressed in session: Goal 1     DATA: Floridalma arrived for her individual session today. She expressed that things are going well and she began back at work this week. She stated that she's doing hybrid and is home on Tuesday and Thursday of this week. She expressed that it was difficult dropping her off at  on Monday, but that she did well. Floridalma shared some obsessive thoughts she's having that causing an increase in anxiety. This provider offered validation and support and encouraged her to challenge these thoughts. Floridalma identified that she's thinking about going back full time and only 40 hours so that she and her daughter can be on a schedule. She discussed some of the options and how she would address this with her boss.   During this session, this clinician used the following therapeutic modalities: Client-centered Therapy, Cognitive Behavioral Therapy, and Supportive Psychotherapy    Substance Abuse was not addressed during this session. If the client is diagnosed with a co-occurring substance use disorder, please indicate any changes in the frequency or amount of use: n/a. Stage of change for addressing substance use diagnoses: No substance use/Not applicable    ASSESSMENT:  Floridalma Tejada presents with a Euthymic/ normal mood.     Floridalma appeared calm and happy in session today. her affect is Normal range and intensity, which is congruent, with her mood and the content of the session. The client has made progress on their goals.     Floridalma Tejada presents with a none risk of suicide, none risk of self-harm, and none risk of harm to others.    For any risk assessment that surpasses a \"low\" rating, a safety plan must be developed.    A safety plan was indicated: no  If yes, describe in detail n/a    PLAN: Between sessions, Floridalma Tejada will " continue challenging her thoughts. At the next session, the therapist will use Client-centered Therapy, Cognitive Behavioral Therapy, and Supportive Psychotherapy to address her anxiety and depression.    Behavioral Health Treatment Plan and Discharge Planning: Floridalma Tejada is aware of and agrees to continue to work on their treatment plan. They have identified and are working toward their discharge goals. yes    Visit start and stop times:    02/01/24  Start Time: 1004  Stop Time: 1045  Total Visit Time: 41 minutes

## 2024-02-08 ENCOUNTER — OFFICE VISIT (OUTPATIENT)
Dept: INTERNAL MEDICINE CLINIC | Age: 29
End: 2024-02-08
Payer: COMMERCIAL

## 2024-02-08 VITALS
HEIGHT: 65 IN | WEIGHT: 194 LBS | SYSTOLIC BLOOD PRESSURE: 112 MMHG | HEART RATE: 70 BPM | BODY MASS INDEX: 32.32 KG/M2 | OXYGEN SATURATION: 98 % | TEMPERATURE: 97.5 F | DIASTOLIC BLOOD PRESSURE: 58 MMHG

## 2024-02-08 DIAGNOSIS — R53.83 OTHER FATIGUE: ICD-10-CM

## 2024-02-08 DIAGNOSIS — G47.00 INSOMNIA, UNSPECIFIED TYPE: ICD-10-CM

## 2024-02-08 DIAGNOSIS — F32.0 MILD MAJOR DEPRESSION (HCC): ICD-10-CM

## 2024-02-08 DIAGNOSIS — E55.9 VITAMIN D DEFICIENCY: ICD-10-CM

## 2024-02-08 DIAGNOSIS — F41.9 ANXIETY: ICD-10-CM

## 2024-02-08 DIAGNOSIS — F41.8 DEPRESSION WITH ANXIETY: Primary | ICD-10-CM

## 2024-02-08 DIAGNOSIS — E53.8 VITAMIN B12 DEFICIENCY: ICD-10-CM

## 2024-02-08 PROCEDURE — 99214 OFFICE O/P EST MOD 30 MIN: CPT | Performed by: PHYSICIAN ASSISTANT

## 2024-02-08 RX ORDER — SERTRALINE HYDROCHLORIDE 100 MG/1
100 TABLET, FILM COATED ORAL DAILY
Start: 2024-02-08

## 2024-02-08 NOTE — PROGRESS NOTES
" Assessment/Plan:         Diagnoses and all orders for this visit:    Depression with anxiety  -     Comprehensive metabolic panel; Future  -     CBC and differential; Future  -     TSH, 3rd generation; Future  -     sertraline (ZOLOFT) 100 mg tablet; Take 1 tablet (100 mg total) by mouth daily    Insomnia, unspecified type  -     Comprehensive metabolic panel; Future  -     CBC and differential; Future  -     TSH, 3rd generation; Future    Mild major depression (HCC)  -     Comprehensive metabolic panel; Future  -     CBC and differential; Future  -     TSH, 3rd generation; Future    Anxiety  -     Comprehensive metabolic panel; Future  -     CBC and differential; Future  -     TSH, 3rd generation; Future    Other fatigue  -     Comprehensive metabolic panel; Future  -     CBC and differential; Future  -     TSH, 3rd generation; Future    Vitamin D deficiency  -     Vitamin D 25 hydroxy; Future    Vitamin B12 deficiency  -     Vitamin B12; Future        Check labs  Continue therapy  Decrease sertraline to 100mg daily and reassess  Can consider switching to lexapro depending on lab results   Subjective:      Patient ID: Floridalma Tejada is a 28 y.o. female.    27 y/o female with anxiety presents to establish care     PMH sig for depression and PP depression  Pt was on lexapro for many years prior to her pregnacy and then was switched to sertraline during pregnancy  She is now 3 months post partum and is breastfeeding - pt plans to continue  Pt would like to consider going back on lexapro     Pt states she has been more tearful lately, \"I don't know what causes it\"  Pt states she is having difficulty sleeping at night as well   Has tried melatonin which hasn't helped   Dgt usually wakes up 1-2 x / night - she usually sleeps around 6 hours    Pt reports she goes to therapy once monthly which she finds helpful  Started mommy and me program once monthly     Pt denies any vaginal bleeding  Pt has general fatigue - back to " working full time   Pt reports daily headaches since starting birth control - takes tylenol prn         The following portions of the patient's history were reviewed and updated as appropriate: allergies, current medications, past family history, past medical history, past social history, past surgical history, and problem list.    Review of Systems   Constitutional:  Negative for activity change, appetite change, chills, diaphoresis and fever.   HENT:  Negative for congestion and sore throat.    Eyes:  Negative for pain and redness.   Respiratory:  Negative for cough, shortness of breath and wheezing.    Cardiovascular:  Negative for chest pain and leg swelling.   Gastrointestinal:  Negative for abdominal pain, constipation, diarrhea and nausea.   Genitourinary:  Negative for dysuria and flank pain.   Musculoskeletal:  Negative for arthralgias, back pain and gait problem.   Skin:  Negative for rash.   Neurological:  Positive for headaches. Negative for dizziness, weakness and light-headedness.   Psychiatric/Behavioral:  Positive for dysphoric mood and sleep disturbance. The patient is nervous/anxious.          Past Medical History:   Diagnosis Date    Allergic rhinitis due to pollen     LAST ASSESSED [id number]    Anxiety     Depression     Has not taken medication for 6 months    Dysmenorrhea     LAST ASSESSED [id number]    Globus sensation     LAST ASSESSED [id number]    Varicella     Wrist sprain     LAST ASSESSED [id number]         Current Outpatient Medications:     norethindrone (MICRONOR) 0.35 MG tablet, Take 1 tablet (0.35 mg total) by mouth daily, Disp: 90 tablet, Rfl: 0    Prenatal Vit-Fe Fumarate-FA (PRENATAL VITAMIN PO), Take 1 tablet by mouth in the morning, Disp: , Rfl:     sertraline (ZOLOFT) 100 mg tablet, Take 1 tablet (100 mg total) by mouth daily, Disp: , Rfl:     Allergies   Allergen Reactions    Fruit C [Ascorbate - Food Allergy] GI Intolerance     Fresh Fruit- stomach pains     Pollen Extract  "Allergic Rhinitis       Social History   Past Surgical History:   Procedure Laterality Date    NO PAST SURGERIES       Family History   Problem Relation Age of Onset    Other Mother         IDIOPATHIC THROMBOCYTOPENIC PURPURA    Alcohol abuse Father     Anemia Father     No Known Problems Sister     No Known Problems Maternal Grandmother     Esophageal cancer Maternal Grandfather     Bone cancer Maternal Grandfather     Cancer Maternal Grandfather     No Known Problems Paternal Grandmother     Multiple sclerosis Paternal Grandfather        Objective:  /58 (BP Location: Left arm, Patient Position: Sitting, Cuff Size: Standard)   Pulse 70   Temp 97.5 °F (36.4 °C) (Temporal)   Ht 5' 5\" (1.651 m)   Wt 88 kg (194 lb)   SpO2 98%   BMI 32.28 kg/m²        Physical Exam  Vitals reviewed.   Constitutional:       General: She is not in acute distress.  HENT:      Head: Normocephalic and atraumatic.      Right Ear: Tympanic membrane, ear canal and external ear normal. There is no impacted cerumen.      Left Ear: Tympanic membrane, ear canal and external ear normal. There is no impacted cerumen.      Nose: Nose normal.      Mouth/Throat:      Mouth: Mucous membranes are moist.   Eyes:      General:         Right eye: No discharge.         Left eye: No discharge.      Extraocular Movements: Extraocular movements intact.      Conjunctiva/sclera: Conjunctivae normal.      Pupils: Pupils are equal, round, and reactive to light.   Cardiovascular:      Rate and Rhythm: Normal rate and regular rhythm.   Pulmonary:      Effort: Pulmonary effort is normal. No respiratory distress.      Breath sounds: Normal breath sounds. No wheezing or rales.   Abdominal:      General: Bowel sounds are normal. There is no distension.   Musculoskeletal:      Cervical back: Normal range of motion.      Right lower leg: No edema.      Left lower leg: No edema.   Lymphadenopathy:      Cervical: No cervical adenopathy.   Neurological:      " General: No focal deficit present.      Mental Status: She is alert and oriented to person, place, and time.   Psychiatric:         Mood and Affect: Mood normal.         Behavior: Behavior normal.

## 2024-02-15 ENCOUNTER — RA CDI HCC (OUTPATIENT)
Dept: OTHER | Facility: HOSPITAL | Age: 29
End: 2024-02-15

## 2024-02-29 ENCOUNTER — SOCIAL WORK (OUTPATIENT)
Dept: BEHAVIORAL/MENTAL HEALTH CLINIC | Facility: CLINIC | Age: 29
End: 2024-02-29
Payer: COMMERCIAL

## 2024-02-29 DIAGNOSIS — F41.8 DEPRESSION WITH ANXIETY: Primary | ICD-10-CM

## 2024-02-29 PROCEDURE — 90834 PSYTX W PT 45 MINUTES: CPT | Performed by: COUNSELOR

## 2024-02-29 NOTE — PSYCH
"Behavioral Health Psychotherapy Progress Note    Psychotherapy Provided: Individual Psychotherapy     1. Depression with anxiety            Goals addressed in session: Goal 1     DATA: Floridalma arrived for her individual session today. She expressed that things are going well as she's back to work full time from maternity leave. She shared that her daughter is doing well in . Floridalma had a discussion with her boss about switching her hours and they compromised, but she wanted to work 40 hours a week instead of 45-50 hours a week. She identified that she has to accept this for right now and she's happier to be back at work. She spoke about some stress surrounding her mom and that she's been over a lot, but hasn't been helpful to her. She identified that she's okay with her mom not helping, but that her mom typically would hep her. She spoke about plans for Easter and how it's been difficult to finalize the plans with her family.   During this session, this clinician used the following therapeutic modalities: Client-centered Therapy and Supportive Psychotherapy    Substance Abuse was not addressed during this session. If the client is diagnosed with a co-occurring substance use disorder, please indicate any changes in the frequency or amount of use: n/a. Stage of change for addressing substance use diagnoses: No substance use/Not applicable    ASSESSMENT:  Floridalma Tejada presents with a Euthymic/ normal mood.     her affect is Normal range and intensity, which is congruent, with her mood and the content of the session. The client has made progress on their goals.     Floridalma Tejada presents with a none risk of suicide, none risk of self-harm, and none risk of harm to others.    For any risk assessment that surpasses a \"low\" rating, a safety plan must be developed.    A safety plan was indicated: no  If yes, describe in detail n/a    PLAN: Between sessions, Floridalma Tejada will continue working on her " identified goals. At the next session, the therapist will use Client-centered Therapy and Supportive Psychotherapy to address her anxiety and depression.    Behavioral Health Treatment Plan and Discharge Planning: Floridalma Tejada is aware of and agrees to continue to work on their treatment plan. They have identified and are working toward their discharge goals. yes    Visit start and stop times:    02/29/24  Start Time: 1506  Stop Time: 1548  Total Visit Time: 42 minutes

## 2024-03-06 ENCOUNTER — TELEPHONE (OUTPATIENT)
Dept: POSTPARTUM | Facility: CLINIC | Age: 29
End: 2024-03-06

## 2024-03-06 NOTE — TELEPHONE ENCOUNTER
Floridalma calling to discuss if it is safe to take melatonin while nursing and if there is any information about it possible decreasing milk supply. The past three days, since beginning this supplement, she has noticed she is pumping just enough for [place] where she used to be able to save some. Discussed that this supply decrease is unlikely to be caused by taking the melatonin. She reports that she has returned to work a few weeks ago. Discussed that this may be related to pumping more often versus direct breastfeeding, she could be experiencing hormonal changes related to her menstrual cycle returning. Discussed calcium/magnesium 2:1 dosing that may help support her supply during this phase of her cycle. Also recommended she change her pump values and/or backflow protectors to see if this is helpful. Call for more questions or to schedule a visit if she does not see an improved with these interventions in the next week or so.

## 2024-03-27 ENCOUNTER — ANNUAL EXAM (OUTPATIENT)
Dept: OBGYN CLINIC | Facility: CLINIC | Age: 29
End: 2024-03-27
Payer: COMMERCIAL

## 2024-03-27 VITALS
WEIGHT: 191.2 LBS | BODY MASS INDEX: 31.86 KG/M2 | SYSTOLIC BLOOD PRESSURE: 120 MMHG | HEIGHT: 65 IN | DIASTOLIC BLOOD PRESSURE: 84 MMHG

## 2024-03-27 DIAGNOSIS — Z01.419 ROUTINE GYNECOLOGICAL EXAMINATION: Primary | ICD-10-CM

## 2024-03-27 DIAGNOSIS — Z30.011 ENCOUNTER FOR INITIAL PRESCRIPTION OF CONTRACEPTIVE PILLS: ICD-10-CM

## 2024-03-27 DIAGNOSIS — N94.10 DYSPAREUNIA IN FEMALE: ICD-10-CM

## 2024-03-27 PROBLEM — O44.40 LOW-LYING PLACENTA: Status: RESOLVED | Noted: 2023-07-12 | Resolved: 2024-03-27

## 2024-03-27 PROCEDURE — S0612 ANNUAL GYNECOLOGICAL EXAMINA: HCPCS | Performed by: PHYSICIAN ASSISTANT

## 2024-03-27 RX ORDER — ACETAMINOPHEN AND CODEINE PHOSPHATE 120; 12 MG/5ML; MG/5ML
1 SOLUTION ORAL DAILY
Qty: 90 TABLET | Refills: 4 | Status: SHIPPED | OUTPATIENT
Start: 2024-03-27

## 2024-03-27 NOTE — PROGRESS NOTES
Assessment   28 y.o.  presenting for annual exam.     Plan   Diagnoses and all orders for this visit:    Routine gynecological examination  Normal findings on routine exam.  Encouraged 150 min of exercise per week.  Reviewed balanced diet.   Multivitamin encouraged   Breast awareness/SBE encouraged     BMI 31.0-31.9,adult  -     Ambulatory Referral to Weight Management; Future    Desires nutrition consult. Referral provided    Encounter for initial prescription of contraceptive pills  -     norethindrone (MICRONOR) 0.35 MG tablet; Take 1 tablet (0.35 mg total) by mouth daily    Amenorrheic since delivery - breastfeeding and on Micronor. Aware of symptoms to report. Refill sent to pharmacy on file.     Mother currently breastfeeding     Dyspareunia in female    - Ambulatory referral to Physical Therapy; Future    We reviewed various tx options to include use of a silicone based lubricant or coconut oil during intercourse. Discussed vaginal moisturizers like Replens, hyaluronic acid suppository, and coconut oil as more of a maintenance or preventative. Reviewed topical estrogen if sx still not improved.     Encouraged trial of PFPT to reduce tension. Agreeable.         Pap - due    Mammo - due @ 40      RTO one year for yearly exam or sooner as needed.    __________________________________________________________________    Subjective     Floridalma Tejada is a 28 y.o.  presenting for annual exam.     Here for first annual following delivery of her daughter by  @ 39w4d on 2023. Prenatal and PP course uncomplicated. Her daughter Junior, is thriving! Floridalma is feeling well overall. Some breakthrough tearfullness and stress but overall feeling well on Zoloft. Recently reduced by PCP from 150 to 100 mg . EPDS 3. Has noted some PP hair loss.   She is back to work. Junior goes to  during the day. Floridalma is happy with her caregivers. Trying to get outside and walk. Not yet exercising  formally.     SCREENING  Last Pap: 2022 NILM  Last Mammo: n/a   Last Colonoscopy: n/a     GYN  She has a hx of low AMH and FH of POF in her moth at age 28, and grandmother in her 30s.     Periods - amenorrheic since delivery, breastfeeding.     Sexually active: Yes - single partner -   Concerns: + dryness and dyspareunia. Denies bleeding  Contraception: Micronor     Hx Abnormal pap: denies  We reviewed ASCCP guidelines for Pap testing today.  Gardasil: She has completed the Gardasil series.    Denies vaginal discharge, itching, odor, dyspareunia, pelvic pain and vulvar/vaginal symptoms    OB         Complaints: denies   Denies urgency, frequency, hematuria, leakage / change in stream, difficulty urinating.       BREAST  Complaints: denies   Denies: breast lump, breast tenderness, nipple discharge, skin color change, and skin lesion(s)    Pertinent Family Hx:   Family hx of breast cancer: no  Family hx of ovarian cancer: no  Family hx of colon cancer: no    GENERAL  PMH reviewed/updated and is as below.     Past Medical History:   Diagnosis Date    Allergic rhinitis due to pollen     LAST ASSESSED 2016    Anxiety     Depression     Has not taken medication for 6 months    Dysmenorrhea     LAST ASSESSED [id number]    Globus sensation     LAST ASSESSED [id number]    Varicella     Wrist sprain     LAST ASSESSED 2017       Past Surgical History:   Procedure Laterality Date    NO PAST SURGERIES           Current Outpatient Medications:     norethindrone (MICRONOR) 0.35 MG tablet, Take 1 tablet (0.35 mg total) by mouth daily, Disp: 90 tablet, Rfl: 4    Prenatal Vit-Fe Fumarate-FA (PRENATAL VITAMIN PO), Take 1 tablet by mouth in the morning, Disp: , Rfl:     sertraline (ZOLOFT) 100 mg tablet, Take 1 tablet (100 mg total) by mouth daily, Disp: , Rfl:     Allergies   Allergen Reactions    Fruit C [Ascorbate - Food Allergy] GI Intolerance     Fresh Fruit- stomach pains     Pollen Extract Allergic  "Rhinitis       Social History     Socioeconomic History    Marital status: /Civil Union     Spouse name: Not on file    Number of children: Not on file    Years of education: Not on file    Highest education level: Not on file   Occupational History    Not on file   Tobacco Use    Smoking status: Never    Smokeless tobacco: Never   Vaping Use    Vaping status: Never Used   Substance and Sexual Activity    Alcohol use: Not Currently     Alcohol/week: 1.0 standard drink of alcohol     Types: 1 Glasses of wine per week    Drug use: No    Sexual activity: Yes     Partners: Male     Birth control/protection: Pill   Other Topics Concern    Not on file   Social History Narrative    CAFFEINE USE    DAILY COFFEE     Social Determinants of Health     Financial Resource Strain: Not on file   Food Insecurity: Not on file   Transportation Needs: Not on file   Physical Activity: Not on file   Stress: Not on file   Social Connections: Not on file   Intimate Partner Violence: Not on file   Housing Stability: Not on file       Review of Systems     Constitutional: Negative.   Respiratory: Negative.    Cardiovascular: Negative   Gastrointestinal: Negative   Breasts: As noted above.   Genitourinary: As noted above.   Psychiatric: Negative     Objective      /84 (BP Location: Left arm, Patient Position: Sitting, Cuff Size: Standard)   Ht 5' 5\" (1.651 m)   Wt 86.7 kg (191 lb 3.2 oz)   LMP  (LMP Unknown)   Breastfeeding Yes   BMI 31.82 kg/m²     Physical Examination:    Patient appears well and is not in distress  Breasts are symmetrical without mass, tenderness, nipple discharge, skin changes or adenopathy.   Abdomen is soft and nontender without masses.   External genitals are normal without lesions or rashes.  Urethral meatus and urethra are normal  Bladder is normal to palpation  Vagina is normal without discharge or bleeding.   Cervix is normal without discharge or lesion.   Uterus is normal, mobile, nontender " without palpable mass.  Adnexa are normal, nontender, without palpable mass.

## 2024-04-03 ENCOUNTER — TELEPHONE (OUTPATIENT)
Age: 29
End: 2024-04-03

## 2024-04-03 NOTE — TELEPHONE ENCOUNTER
Called patient and left a message that she needs to be 3 months post partum and not breastfeeding before she is able to schedule an appointment with Weight Management.

## 2024-04-03 NOTE — TELEPHONE ENCOUNTER
Patient called in to schedule from referral but she is currently breastfeeding so I was unable to schedule her.  Can someone from the office please give her a call?  Thanks.

## 2024-04-04 ENCOUNTER — SOCIAL WORK (OUTPATIENT)
Dept: BEHAVIORAL/MENTAL HEALTH CLINIC | Facility: CLINIC | Age: 29
End: 2024-04-04

## 2024-04-04 DIAGNOSIS — F41.8 DEPRESSION WITH ANXIETY: Primary | ICD-10-CM

## 2024-04-04 NOTE — PSYCH
Behavioral Health Psychotherapy Progress Note    Psychotherapy Provided: Individual Psychotherapy     1. Depression with anxiety            Goals addressed in session: Goal 1     DATA: Floridalma arrived for her individual session today. We updated her treatment plan and processed through some friendships she has lost recently. She spoke about a friend she has worked with and that she put her notice in and her grandmother messaged her via social media expressing that Floridalma harassed her. Floridalma was able to reflect upon what the relationship had looked like and that she didn't feel that she has done anything wrong. She did take ownership for some things that were said. She recognizes that many people take so much from her and she often gives, but that people often leave her life. She gave a few more examples and then spoke about her mom and grandmother wanting her sister to join them for [place] when Floridalma requested this didn't happen. This provider validated her and encouraged her to advocate for herself and focus on the things she can control. Floridalma briefly shared about getting a  for next week, but that they don't know anyone or trust anyone. We discussed a few options that Floridalma could turn to.   During this session, this clinician used the following therapeutic modalities: Client-centered Therapy and Supportive Psychotherapy    Substance Abuse was not addressed during this session. If the client is diagnosed with a co-occurring substance use disorder, please indicate any changes in the frequency or amount of use: n/a. Stage of change for addressing substance use diagnoses: No substance use/Not applicable    ASSESSMENT:  Floridalma Tejada presents with a Euthymic/ normal mood.     her affect is Normal range and intensity, which is congruent, with her mood and the content of the session. The client has made progress on their goals.     Floridalma Tejada presents with a none risk of suicide, none  "risk of self-harm, and none risk of harm to others.    For any risk assessment that surpasses a \"low\" rating, a safety plan must be developed.    A safety plan was indicated: no  If yes, describe in detail n/a    PLAN: Between sessions, Floridalma Tejada will continue challenging her thoughts and advocating for herself. At the next session, the therapist will use Client-centered Therapy, Solution-Focused Therapy, and Supportive Psychotherapy to address her anxiety and depression.    Behavioral Health Treatment Plan and Discharge Planning: Floridalma Tejada is aware of and agrees to continue to work on their treatment plan. They have identified and are working toward their discharge goals. yes    Visit start and stop times:    04/04/24  Start Time: 1705  Stop Time: 1801  Total Visit Time: 56 minutes  "

## 2024-04-04 NOTE — BH TREATMENT PLAN
Outpatient Behavioral Health Psychotherapy Treatment Plan     Floridalma Tejada  1995      Date of Initial Psychotherapy Assessment: 6/29/2022  Date of Current Treatment Plan: 4/4/2024  Treatment Plan Target Date: 10/1/2024  Treatment Plan Expiration Date: 10/1/2024     Diagnosis:   1. Depression with anxiety                Area(s) of Need: coping skills to manage her symptoms, process through her past, and set boundaries      Long Term Goal 1 (in the client's own words): Floridalma will process through her past and move forward     Progress: Floridalma and this provider continue to work on past struggles and how it impacts her future moving forward. Floridalma feels that she has made an improvement.      Stage of Change: Action     Target Date for completion: TBD             Anticipated therapeutic modalities: Solution-Focused Therapy, Mindfulness-Based Therapy, Client-Centered Therapy, Cognitive Behavioral Therapy, Supportive Therapy             People identified to complete this goal: Floridalma and Therapist                     Objective 1: (identify the means of measuring success in meeting the objective): Floridalma will identify her feelings regarding her past                    Objective 2: (identify the means of measuring success in meeting the objective): Floridalma will develop cognitive restructuring skills in order to move on from her past      Long Term Goal 2 (in the client's own words): Floridalma will improve herself and how she acts towards others     Progress: Floridalma feels she has made a big improvement with asserting herself and moving forward towards letting things go.      Stage of Change: Action     Target Date for completion: TBD             Anticipated therapeutic modalities: Folridalma will develop cognitive restructuring skills in order to move on from her past             People identified to complete this goal: Floridalma and Therapist                     Objective 1: (identify the means of measuring  "success in meeting the objective): Floridalma will identify how she would like to treat others                    Objective 2: (identify the means of measuring success in meeting the objective): Floridalma will work on empowering herself       I am currently under the care of a [facility] psychiatric provider: yes     My [facility] psychiatric provider is: Michelle Lomeli LPC     I am currently taking psychiatric medications: Yes, as prescribed by PCP     I feel that I will be ready for discharge from mental health care when I reach the following (measurable goal/objective): \"Never\"      For children and adults who have a legal guardian:          Has there been any change to custody orders and/or guardianship status? NA. If yes, attach updated documentation.     I have not created my Crisis Plan and have been offered a copy of this plan     Behavioral Health Treatment Plan [facility]: Diagnosis and Treatment Plan explained to Floridalma Jamabryce Peteyusra acknowledges an understanding of their diagnosis. Floridalma Tejada agrees to this treatment plan.     I have been offered a copy of this Treatment Plan. yes  "

## 2024-04-21 ENCOUNTER — PATIENT MESSAGE (OUTPATIENT)
Dept: OBGYN CLINIC | Facility: CLINIC | Age: 29
End: 2024-04-21

## 2024-04-22 ENCOUNTER — TELEPHONE (OUTPATIENT)
Dept: OBGYN CLINIC | Facility: CLINIC | Age: 29
End: 2024-04-22

## 2024-04-22 NOTE — TELEPHONE ENCOUNTER
Regarding: Medicine while breastfeeding   Contact: [phone]  ----- Message from Sonam Quezada RN sent at 4/22/2024 10:49 AM EDT -----       ----- Message from Floridalma Tejada to Michelle Andrew PA-C sent at 4/21/2024  4:59 PM -----   Hello!     Can I take allergy medicine while I am breastfeeding?     Also, can I take Collagen while breastfeeding?     Thanks!

## 2024-04-22 NOTE — PATIENT COMMUNICATION
Zyrtec and Claritin safe for lactation in short term doses. Unaware about Collagen. Will route to clinical office team to review.

## 2024-05-02 ENCOUNTER — SOCIAL WORK (OUTPATIENT)
Dept: BEHAVIORAL/MENTAL HEALTH CLINIC | Facility: CLINIC | Age: 29
End: 2024-05-02

## 2024-05-02 DIAGNOSIS — Z91.199 NO-SHOW FOR APPOINTMENT: Primary | ICD-10-CM

## 2024-05-02 NOTE — PSYCH
No Call. No Show. No Charge    Floridalma Tejada no showed 05/02/24 appointment , staff called and left message to reschedule appointment     Treatment Plan not due at this session.

## 2024-05-03 ENCOUNTER — TELEPHONE (OUTPATIENT)
Dept: PSYCHIATRY | Facility: CLINIC | Age: 29
End: 2024-05-03

## 2024-05-30 ENCOUNTER — TELEPHONE (OUTPATIENT)
Dept: BEHAVIORAL/MENTAL HEALTH CLINIC | Facility: CLINIC | Age: 29
End: 2024-05-30

## 2024-05-30 NOTE — TELEPHONE ENCOUNTER
Provider reached out to patient to discuss continuing treatment as she identified that she wanted to cancel all her appointments moving forward via [facility]. Provider left a voicemail asking for a return call.

## 2024-05-31 NOTE — ASSESSMENT & PLAN NOTE
Chief Complaint   Patient presents with    Establish Care     A 50-year-old  female with past medical history of ADD and asthma presents today to establish care    General health status: good  Diet: Balanced  Exercise: Daily  Associated Sx: As below  Medical encounters within last 12 months: ER Visits: 0 , Hospitalization: 0  Compliance problems: None  Additional complaint: as below  Additional Hx  Last eye exam: 6 months ago  Last dental exam: 6 months ago  Last PAP smear: 2021, normal PAP with neg HPV  Last mammogram: 2024  Last colon cancer screening: Colonoscopy scheduled for next month  Last podiatric foot exam: NA  Seat belt use: routinely, all the time  Caffeine Use: Daily, coffee  Tobacco use: Denies  Illicit drug use: None  Alcohol use: Social    ADD  - Takes Adderall 30 mg twice a day  - Was prescribed to her by the previous PCP on a monthly basis  - Denies any other drug use  - Uses Adderall on a daily basis    ROS    Andrea having any fever, chills, weakness, fatigue, visual problems, sore throat, SOB, cough, chest pain, palpitations, abd pain, n/v/d, constipation, dysuria, hematuria, joint/muscle pain, rash, depression, anxiety or suicidal ideations    Past Medical History:   Diagnosis Date    ADHD (attention deficit hyperactivity disorder)     Anxiety     Arthritis     Asthma (CMD)     under control    Cervical radiculopathy     Sacral fracture  (CMD)     Vascular disorder of extremity (CMD)     venous malformations both LE s/p sclerosis of vein     Past Surgical History:   Procedure Laterality Date     section, classic      ,     Ir spine injection      Knee surgery Bilateral     KNEE SCOPE x5    Lasik surgery Bilateral     Sleeve gastroplasty  2013    Total abdom hysterectomy  2003    Varicose vein surgery Bilateral     COSMETIC     Family History   Problem Relation Age of Onset    Patient is unaware of any medical problems Mother     Myocardial Infarction Father 56     Following closely with therapist and PCP for management  Zoloft dose was adjusted to 100 mg in mid September  Has noted some improvement with dose increase but does not feel 100% improvement yet. Met with PCP today and has elected to continue on current dose and monitor symptoms.  Will message them if she feels she could benefit from dose adjustment as she gets closer to delivery and into the postpartum window  Reviewed precautions Asthma Daughter     Asthma Son     Cancer, Lung Maternal Grandmother     Cancer, Lung Maternal Grandfather     Patient is unaware of any medical problems Paternal Grandmother     Patient is unaware of any medical problems Paternal Grandfather      Social History     Socioeconomic History    Marital status:      Spouse name: Not on file    Number of children: Not on file    Years of education: Not on file    Highest education level: Not on file   Occupational History    Not on file   Tobacco Use    Smoking status: Never     Passive exposure: Never    Smokeless tobacco: Never   Vaping Use    Vaping status: never used   Substance and Sexual Activity    Alcohol use: Yes     Alcohol/week: 2.0 standard drinks of alcohol     Types: 2 Glasses of wine per week     Comment: social    Drug use: No    Sexual activity: Yes     Partners: Male     Birth control/protection: None   Other Topics Concern    Not on file   Social History Narrative    Not on file     Social Determinants of Health     Financial Resource Strain: Not on file   Food Insecurity: Not on file   Transportation Needs: Not on file   Physical Activity: Insufficiently Active (1/16/2019)    Exercise Vital Sign     Days of Exercise per Week: 3 days     Minutes of Exercise per Session: 30 min   Stress: Not on file   Social Connections: Not on file   Interpersonal Safety: Not on file (12/29/2022)     Current Outpatient Medications   Medication Sig Dispense Refill    amphetamine-dextroamphetamine (ADDERALL) 30 MG tablet Take 1 tablet by mouth 2 times daily. 60 tablet 0    ondansetron (Zofran ODT) 4 MG disintegrating tablet Place 1 tablet onto the tongue every 8 hours as needed for Nausea. 20 tablet 1    albuterol 108 (90 Base) MCG/ACT inhaler Inhale 2 puffs into the lungs every 4 hours as needed for Shortness of Breath or Wheezing. 1 each 11     No current facility-administered medications for this visit.       ALLERGIES:   Allergen Reactions    Morphine HIVES        PE:  Vitals: /62   Pulse 79   Temp 97.2 °F (36.2 °C) (Temporal)   Ht 5' 8\" (1.727 m)   Wt 87.4 kg (192 lb 11.2 oz)   BMI 29.30 kg/m²   BSA 2.01 m²    General: Alert and oriented, NAD, normal ambulation without any assistance, appearance WNL  Respiratory: CTABL, Respirations are non-labored, Breath sounds are equal, Symmetrical chest wall expansion.   Cardiovascular: Normal rate, Regular rhythm, No murmur.   Integumentary: Warm, Pink.   Cognition and Speech: Oriented, Speech clear and coherent.     Assessment/Plan    1. Attention deficit hyperactivity disorder (ADHD), combined type  - controlled substance contract was reviewed by each line  - patient signed the contract  - will order UDS today  - discussed that patient can't be using any other drugs and I can order UDS at anytime  - if any part of the contract is not fulfilled, then I will not prescribe any controlled substance in the future  - patient expressed understanding and Adderall will be refilled every month  - Follow-up in 6 months    - Pain Management Profile; Future

## 2024-06-03 ENCOUNTER — TELEPHONE (OUTPATIENT)
Dept: PSYCHIATRY | Facility: CLINIC | Age: 29
End: 2024-06-03

## 2024-06-15 DIAGNOSIS — F41.8 DEPRESSION WITH ANXIETY: ICD-10-CM

## 2024-06-15 DIAGNOSIS — Z30.011 ENCOUNTER FOR INITIAL PRESCRIPTION OF CONTRACEPTIVE PILLS: ICD-10-CM

## 2024-06-16 RX ORDER — SERTRALINE HYDROCHLORIDE 100 MG/1
100 TABLET, FILM COATED ORAL DAILY
Qty: 90 TABLET | Refills: 0 | Status: SHIPPED | OUTPATIENT
Start: 2024-06-16 | End: 2024-09-14

## 2024-06-16 RX ORDER — ACETAMINOPHEN AND CODEINE PHOSPHATE 120; 12 MG/5ML; MG/5ML
1 SOLUTION ORAL DAILY
Qty: 90 TABLET | Refills: 0 | OUTPATIENT
Start: 2024-06-16

## 2024-07-09 ENCOUNTER — DOCUMENTATION (OUTPATIENT)
Dept: BEHAVIORAL/MENTAL HEALTH CLINIC | Facility: CLINIC | Age: 29
End: 2024-07-09

## 2024-07-09 ENCOUNTER — TELEPHONE (OUTPATIENT)
Dept: PSYCHIATRY | Facility: CLINIC | Age: 29
End: 2024-07-09

## 2024-07-09 DIAGNOSIS — F41.8 DEPRESSION WITH ANXIETY: Primary | ICD-10-CM
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
## 2024-07-09 NOTE — TELEPHONE ENCOUNTER
DISCHARGE LETTER for Michelle Lomeli LPC (certified and regular) placed in outgoing mail on 07/09/24.    Article #:  [id number]    Address:  [address] [place], [place] [address] [address]

## 2024-09-13 DIAGNOSIS — F41.8 DEPRESSION WITH ANXIETY: ICD-10-CM

## 2024-09-13 RX ORDER — SERTRALINE HYDROCHLORIDE 100 MG/1
100 TABLET, FILM COATED ORAL DAILY
Qty: 90 TABLET | Refills: 1 | Status: SHIPPED | OUTPATIENT
Start: 2024-09-13

## 2024-10-04 ENCOUNTER — OFFICE VISIT (OUTPATIENT)
Age: 29
End: 2024-10-04
Payer: COMMERCIAL

## 2024-10-04 VITALS
TEMPERATURE: 98.2 F | WEIGHT: 200.4 LBS | SYSTOLIC BLOOD PRESSURE: 130 MMHG | BODY MASS INDEX: 33.39 KG/M2 | DIASTOLIC BLOOD PRESSURE: 86 MMHG | HEIGHT: 65 IN | OXYGEN SATURATION: 98 % | HEART RATE: 76 BPM

## 2024-10-04 DIAGNOSIS — E03.9 HYPOTHYROIDISM, UNSPECIFIED TYPE: ICD-10-CM

## 2024-10-04 DIAGNOSIS — Z13.228 SCREENING FOR METABOLIC DISORDER: Primary | ICD-10-CM

## 2024-10-04 DIAGNOSIS — F41.8 DEPRESSION WITH ANXIETY: ICD-10-CM

## 2024-10-04 DIAGNOSIS — R68.82 LOW LIBIDO: ICD-10-CM

## 2024-10-04 DIAGNOSIS — E78.5 HYPERLIPIDEMIA, UNSPECIFIED HYPERLIPIDEMIA TYPE: ICD-10-CM

## 2024-10-04 DIAGNOSIS — R79.89 ABNORMAL TSH: ICD-10-CM

## 2024-10-04 PROCEDURE — 99214 OFFICE O/P EST MOD 30 MIN: CPT | Performed by: NURSE PRACTITIONER

## 2024-10-04 NOTE — ASSESSMENT & PLAN NOTE
-Discussed calorie counting and increased exercise  -Will follow-up in 2 months to discuss potential weight loss medications  -will get fasting blood work    0 = independent

## 2024-10-04 NOTE — PROGRESS NOTES
Assessment/Plan:    Depression with anxiety  Depression Screening Follow-up Plan: Patient's depression screening was positive with a PHQ-9 score of 8. Patient assessed for underlying major depression. They have no active suicidal ideations. Brief counseling provided and recommend additional follow-up/re-evaluation next office visit.  -continue Zoloft     Low libido  -Likely secondary to breast-feeding and being postpartum  -Did recommend following up with GYN      BMI 33.0-33.9,adult  -Discussed calorie counting and increased exercise  -Will follow-up in 2 months to discuss potential weight loss medications  -will get fasting blood work               Diagnoses and all orders for this visit:    Screening for metabolic disorder  -     Comprehensive metabolic panel; Future  -     CBC and differential  -     Lipid panel    Hyperlipidemia, unspecified hyperlipidemia type  -     Lipid panel    Abnormal TSH  -     TSH, 3rd generation with Free T4 reflex    Hypothyroidism, unspecified type  -     TSH, 3rd generation with Free T4 reflex    Depression with anxiety    Low libido    BMI 33.0-33.9,adult    Other orders  -     NON FORMULARY; Take 10 mg by mouth in the morning          Subjective:      Patient ID: Floridalma Tejada is a 29 y.o. female.    Patient presents today with concerns for heat flashes, struggling to lose weight and low sex drive.   She is postpartum 10 months.   She is currently breast feeding.     She has been walking and eating well, but she has not been losing weight     Heat flashes at night    She also reports that she is having trouble focusing     Low sex drive        The following portions of the patient's history were reviewed and updated as appropriate: allergies, current medications, past family history, past medical history, past social history, past surgical history, and problem list.    Review of Systems   Constitutional:  Negative for activity change, appetite change, chills, diaphoresis and  fever.   HENT:  Negative for congestion, ear discharge, ear pain, postnasal drip, rhinorrhea, sinus pressure, sinus pain and sore throat.    Eyes:  Negative for pain, discharge, itching and visual disturbance.   Respiratory:  Negative for cough, chest tightness, shortness of breath and wheezing.    Cardiovascular:  Negative for chest pain, palpitations and leg swelling.   Gastrointestinal:  Negative for abdominal pain, constipation, diarrhea, nausea and vomiting.   Endocrine: Negative for polydipsia, polyphagia and polyuria.   Genitourinary:  Negative for difficulty urinating, dysuria and urgency.   Musculoskeletal:  Negative for arthralgias, back pain and neck pain.   Skin:  Negative for rash and wound.   Neurological:  Negative for dizziness, weakness, numbness and headaches.         Past Medical History:   Diagnosis Date    Allergic rhinitis due to pollen     LAST ASSESSED [id number]    Anxiety     Depression     Has not taken medication for 6 months    Dysmenorrhea     LAST ASSESSED [id number]    Globus sensation     LAST ASSESSED [id number]    Varicella     Wrist sprain     LAST ASSESSED [id number]         Current Outpatient Medications:     NON FORMULARY, Take 10 mg by mouth in the morning, Disp: , Rfl:     norethindrone (MICRONOR) 0.35 MG tablet, Take 1 tablet (0.35 mg total) by mouth daily, Disp: 90 tablet, Rfl: 4    sertraline (ZOLOFT) 100 mg tablet, TAKE 1 TABLET BY MOUTH EVERY DAY, Disp: 90 tablet, Rfl: 1    Prenatal Vit-Fe Fumarate-FA (PRENATAL VITAMIN PO), Take 1 tablet by mouth in the morning, Disp: , Rfl:     Allergies   Allergen Reactions    Fruit C [Ascorbate - Food Allergy] GI Intolerance     Fresh Fruit- stomach pains     Pollen Extract Allergic Rhinitis       Social History   Past Surgical History:   Procedure Laterality Date    NO PAST SURGERIES       Family History   Problem Relation Age of Onset    Other Mother         IDIOPATHIC THROMBOCYTOPENIC PURPURA    Alcohol abuse Father     Anemia Father   "   No Known Problems Sister     No Known Problems Maternal Grandmother     Esophageal cancer Maternal Grandfather     Bone cancer Maternal Grandfather     Cancer Maternal Grandfather     No Known Problems Paternal Grandmother     Multiple sclerosis Paternal Grandfather        Objective:  /86 (BP Location: Left arm, Patient Position: Sitting, Cuff Size: Standard)   Pulse 76   Temp 98.2 °F (36.8 °C) (Temporal)   Ht 5' 5\" (1.651 m)   Wt 90.9 kg (200 lb 6.4 oz)   SpO2 98%   BMI 33.35 kg/m²              Physical Exam  Constitutional:       General: She is not in acute distress.     Appearance: She is well-developed. She is not diaphoretic.   HENT:      Head: Normocephalic and atraumatic.      Right Ear: External ear normal.      Left Ear: External ear normal.      Nose: Nose normal.      Mouth/Throat:      Mouth: Mucous membranes are moist.      Pharynx: No oropharyngeal exudate or posterior oropharyngeal erythema.   Eyes:      General:         Right eye: No discharge.         Left eye: No discharge.      Conjunctiva/sclera: Conjunctivae normal.      Pupils: Pupils are equal, round, and reactive to light.   Neck:      Thyroid: No thyromegaly.   Cardiovascular:      Rate and Rhythm: Normal rate and regular rhythm.      Heart sounds: Normal heart sounds. No murmur heard.     No friction rub. No gallop.   Pulmonary:      Effort: Pulmonary effort is normal. No respiratory distress.      Breath sounds: Normal breath sounds. No stridor. No wheezing or rales.   Abdominal:      General: Bowel sounds are normal. There is no distension.      Palpations: Abdomen is soft.      Tenderness: There is no abdominal tenderness.   Musculoskeletal:      Cervical back: Normal range of motion and neck supple.   Lymphadenopathy:      Cervical: No cervical adenopathy.   Skin:     General: Skin is warm and dry.      Findings: No erythema or rash.   Neurological:      Mental Status: She is alert and oriented to person, place, and " time.   Psychiatric:         Behavior: Behavior normal.         Thought Content: Thought content normal.         Judgment: Judgment normal.

## 2024-10-04 NOTE — ASSESSMENT & PLAN NOTE
Depression Screening Follow-up Plan: Patient's depression screening was positive with a PHQ-9 score of 8. Patient assessed for underlying major depression. They have no active suicidal ideations. Brief counseling provided and recommend additional follow-up/re-evaluation next office visit.  -continue Zoloft

## 2024-11-26 ENCOUNTER — OFFICE VISIT (OUTPATIENT)
Age: 29
End: 2024-11-26
Payer: COMMERCIAL

## 2024-11-26 VITALS
TEMPERATURE: 98.4 F | HEIGHT: 65 IN | BODY MASS INDEX: 33.95 KG/M2 | WEIGHT: 203.8 LBS | SYSTOLIC BLOOD PRESSURE: 140 MMHG | DIASTOLIC BLOOD PRESSURE: 96 MMHG | HEART RATE: 74 BPM | OXYGEN SATURATION: 99 %

## 2024-11-26 DIAGNOSIS — R41.840 LACK OF CONCENTRATION: ICD-10-CM

## 2024-11-26 DIAGNOSIS — F32.2 SEVERE MAJOR DEPRESSIVE DISORDER (HCC): ICD-10-CM

## 2024-11-26 PROCEDURE — 99214 OFFICE O/P EST MOD 30 MIN: CPT | Performed by: NURSE PRACTITIONER

## 2024-11-26 NOTE — ASSESSMENT & PLAN NOTE
-Continue to monitor  -Considered Wellbutrin however patient reports side effects with this medication in the past

## 2024-11-26 NOTE — PROGRESS NOTES
Name: Floridalma Tejada      : 1995      MRN: [id number]  Encounter Provider: CORRIE Ma  Encounter Date: 2024   Encounter department: [facility]  :  Assessment & Plan  BMI 33.0-33.9,adult  -Discussed calorie counting and increased exercise  -Will follow-up in 2 weeks to discuss potential weight loss medications  -will get fasting blood work   -Wellbutrin not an option due to previous side effects    Orders:    POCT urine HCG    Severe major depressive disorder (HCC)  -Well-controlled on Zoloft         Lack of concentration  -Continue to monitor  -Considered Wellbutrin however patient reports side effects with this medication in the past                History of Present Illness     Patient presents today with concerns for heat flashes, struggling to lose weight and low sex drive.   She is postpartum 10 months.   She recently stopped breast-feeding    She has been walking and eating well, but she has not been losing weight   She reports that she has been riding her bike more often     Heat flashes at night    She also reports that she is having trouble focusing, completing tasks and lack of concentration    Low sex drive        Review of Systems   Constitutional:  Negative for activity change, appetite change, chills, diaphoresis and fever.   HENT:  Negative for congestion, ear discharge, ear pain, postnasal drip, rhinorrhea, sinus pressure, sinus pain and sore throat.    Eyes:  Negative for pain, discharge, itching and visual disturbance.   Respiratory:  Negative for cough, chest tightness, shortness of breath and wheezing.    Cardiovascular:  Negative for chest pain, palpitations and leg swelling.   Gastrointestinal:  Negative for abdominal pain, constipation, diarrhea, nausea and vomiting.   Endocrine: Negative for polydipsia, polyphagia and polyuria.   Genitourinary:  Negative for difficulty urinating, dysuria and urgency.   Musculoskeletal:   "Negative for arthralgias, back pain and neck pain.   Skin:  Negative for rash and wound.   Neurological:  Negative for dizziness, weakness, numbness and headaches.   Psychiatric/Behavioral:  Negative for dysphoric mood. The patient is not nervous/anxious.         Objective   /96 (BP Location: Left arm, Patient Position: Sitting, Cuff Size: Standard)   Pulse 74   Temp 98.4 °F (36.9 °C) (Temporal)   Ht 5' 5\" (1.651 m)   Wt 92.4 kg (203 lb 12.8 oz)   SpO2 99%   BMI 33.91 kg/m²      Physical Exam  Constitutional:       General: She is not in acute distress.     Appearance: She is well-developed. She is not diaphoretic.   HENT:      Head: Normocephalic and atraumatic.      Right Ear: External ear normal.      Left Ear: External ear normal.      Nose: Nose normal.      Mouth/Throat:      Mouth: Mucous membranes are moist.      Pharynx: No oropharyngeal exudate or posterior oropharyngeal erythema.   Eyes:      General:         Right eye: No discharge.         Left eye: No discharge.      Conjunctiva/sclera: Conjunctivae normal.      Pupils: Pupils are equal, round, and reactive to light.   Neck:      Thyroid: No thyromegaly.   Cardiovascular:      Rate and Rhythm: Normal rate and regular rhythm.      Heart sounds: Normal heart sounds. No murmur heard.     No friction rub. No gallop.   Pulmonary:      Effort: Pulmonary effort is normal. No respiratory distress.      Breath sounds: Normal breath sounds. No stridor. No wheezing or rales.   Abdominal:      General: Bowel sounds are normal. There is no distension.      Palpations: Abdomen is soft.      Tenderness: There is no abdominal tenderness.   Musculoskeletal:      Cervical back: Normal range of motion and neck supple.   Lymphadenopathy:      Cervical: No cervical adenopathy.   Skin:     General: Skin is warm and dry.      Findings: No erythema or rash.   Neurological:      Mental Status: She is alert and oriented to person, place, and time.   Psychiatric:    "      Behavior: Behavior normal.         Thought Content: Thought content normal.         Judgment: Judgment normal.

## 2024-11-26 NOTE — ASSESSMENT & PLAN NOTE
-Discussed calorie counting and increased exercise  -Will follow-up in 2 weeks to discuss potential weight loss medications  -will get fasting blood work   -Wellbutrin not an option due to previous side effects    Orders:    POCT urine HCG

## 2024-11-29 ENCOUNTER — RESULTS FOLLOW-UP (OUTPATIENT)
Age: 29
End: 2024-11-29

## 2024-11-29 ENCOUNTER — TELEPHONE (OUTPATIENT)
Age: 29
End: 2024-11-29

## 2024-11-29 ENCOUNTER — APPOINTMENT (OUTPATIENT)
Dept: LAB | Facility: HOSPITAL | Age: 29
End: 2024-11-29
Payer: COMMERCIAL

## 2024-11-29 DIAGNOSIS — Z13.228 SCREENING FOR METABOLIC DISORDER: ICD-10-CM

## 2024-11-29 DIAGNOSIS — Z51.81 MEDICATION MONITORING ENCOUNTER: ICD-10-CM

## 2024-11-29 DIAGNOSIS — E53.8 VITAMIN B12 DEFICIENCY: Primary | ICD-10-CM

## 2024-11-29 LAB
ALBUMIN SERPL BCG-MCNC: 4.6 G/DL (ref 3.5–5)
ALP SERPL-CCNC: 127 U/L (ref 34–104)
ALT SERPL W P-5'-P-CCNC: 28 U/L (ref 7–52)
ANION GAP SERPL CALCULATED.3IONS-SCNC: 9 MMOL/L (ref 4–13)
AST SERPL W P-5'-P-CCNC: 22 U/L (ref 13–39)
B-HCG SERPL-ACNC: <0.6 MIU/ML (ref 0–5)
BASOPHILS # BLD AUTO: 0.03 THOUSANDS/ΜL (ref 0–0.1)
BASOPHILS NFR BLD AUTO: 0 % (ref 0–1)
BILIRUB SERPL-MCNC: 0.55 MG/DL (ref 0.2–1)
BUN SERPL-MCNC: 10 MG/DL (ref 5–25)
CALCIUM SERPL-MCNC: 9.3 MG/DL (ref 8.4–10.2)
CHLORIDE SERPL-SCNC: 104 MMOL/L (ref 96–108)
CHOLEST SERPL-MCNC: 170 MG/DL (ref ?–200)
CO2 SERPL-SCNC: 25 MMOL/L (ref 21–32)
CREAT SERPL-MCNC: 0.72 MG/DL (ref 0.6–1.3)
EOSINOPHIL # BLD AUTO: 0.2 THOUSAND/ΜL (ref 0–0.61)
EOSINOPHIL NFR BLD AUTO: 2 % (ref 0–6)
ERYTHROCYTE [DISTWIDTH] IN BLOOD BY AUTOMATED COUNT: 12.5 % (ref 11.6–15.1)
GFR SERPL CREATININE-BSD FRML MDRD: 115 ML/MIN/1.73SQ M
GLUCOSE P FAST SERPL-MCNC: 97 MG/DL (ref 65–99)
HCT VFR BLD AUTO: 41.9 % (ref 34.8–46.1)
HDLC SERPL-MCNC: 63 MG/DL
HGB BLD-MCNC: 14.5 G/DL (ref 11.5–15.4)
IMM GRANULOCYTES # BLD AUTO: 0.01 THOUSAND/UL (ref 0–0.2)
IMM GRANULOCYTES NFR BLD AUTO: 0 % (ref 0–2)
LDLC SERPL CALC-MCNC: 85 MG/DL (ref 0–100)
LYMPHOCYTES # BLD AUTO: 2.17 THOUSANDS/ΜL (ref 0.6–4.47)
LYMPHOCYTES NFR BLD AUTO: 26 % (ref 14–44)
MCH RBC QN AUTO: 30.5 PG (ref 26.8–34.3)
MCHC RBC AUTO-ENTMCNC: 34.6 G/DL (ref 31.4–37.4)
MCV RBC AUTO: 88 FL (ref 82–98)
MONOCYTES # BLD AUTO: 0.47 THOUSAND/ΜL (ref 0.17–1.22)
MONOCYTES NFR BLD AUTO: 6 % (ref 4–12)
NEUTROPHILS # BLD AUTO: 5.52 THOUSANDS/ΜL (ref 1.85–7.62)
NEUTS SEG NFR BLD AUTO: 66 % (ref 43–75)
NONHDLC SERPL-MCNC: 107 MG/DL
NRBC BLD AUTO-RTO: 0 /100 WBCS
PLATELET # BLD AUTO: 314 THOUSANDS/UL (ref 149–390)
PMV BLD AUTO: 8.8 FL (ref 8.9–12.7)
POTASSIUM SERPL-SCNC: 3.7 MMOL/L (ref 3.5–5.3)
PROT SERPL-MCNC: 7.2 G/DL (ref 6.4–8.4)
RBC # BLD AUTO: 4.76 MILLION/UL (ref 3.81–5.12)
SODIUM SERPL-SCNC: 138 MMOL/L (ref 135–147)
TRIGL SERPL-MCNC: 108 MG/DL (ref ?–150)
TSH SERPL DL<=0.05 MIU/L-ACNC: 1.95 UIU/ML (ref 0.45–4.5)
WBC # BLD AUTO: 8.4 THOUSAND/UL (ref 4.31–10.16)

## 2024-11-29 PROCEDURE — 80053 COMPREHEN METABOLIC PANEL: CPT

## 2024-11-29 PROCEDURE — 84702 CHORIONIC GONADOTROPIN TEST: CPT

## 2024-11-29 NOTE — TELEPHONE ENCOUNTER
[place] from lab from out patient, had called in and mentioned that the patients blood work order is requesting for her to have a urine pregnancy test done. They do not do these urine test at the lab, and only blood work ones.     Please have someone advise to provider in getting that code changed to lab 144, for a pregnancy blood work.

## 2024-12-06 ENCOUNTER — RA CDI HCC (OUTPATIENT)
Dept: OTHER | Facility: HOSPITAL | Age: 29
End: 2024-12-06

## 2024-12-09 ENCOUNTER — OFFICE VISIT (OUTPATIENT)
Age: 29
End: 2024-12-09
Payer: COMMERCIAL

## 2024-12-09 VITALS
SYSTOLIC BLOOD PRESSURE: 124 MMHG | BODY MASS INDEX: 34.35 KG/M2 | OXYGEN SATURATION: 97 % | HEIGHT: 65 IN | TEMPERATURE: 97.8 F | WEIGHT: 206.2 LBS | HEART RATE: 73 BPM | DIASTOLIC BLOOD PRESSURE: 84 MMHG

## 2024-12-09 DIAGNOSIS — Z79.899 MEDICATION MANAGEMENT: ICD-10-CM

## 2024-12-09 DIAGNOSIS — F32.2 SEVERE MAJOR DEPRESSIVE DISORDER (HCC): ICD-10-CM

## 2024-12-09 PROBLEM — Z91.199 NO-SHOW FOR APPOINTMENT: Status: RESOLVED | Noted: 2024-05-02 | Resolved: 2024-12-09

## 2024-12-09 PROCEDURE — 93000 ELECTROCARDIOGRAM COMPLETE: CPT | Performed by: NURSE PRACTITIONER

## 2024-12-09 PROCEDURE — 99214 OFFICE O/P EST MOD 30 MIN: CPT | Performed by: NURSE PRACTITIONER

## 2024-12-09 NOTE — ASSESSMENT & PLAN NOTE
-Discussed calorie counting and increased exercise  Contraception: OCP  Start wegovy escalation - 0.25mg sent to pharmacy. Discussed dose titration. Use and side effect profile reviewed      - Patient denies personal history of pancreatitis. Patient also denies personal and family history of thyroid cancer and multiple endocrine neoplasia type 2 (MEN 2 tumor).   -follow up in 3 months or sooner if needed   -Wellbutrin not an option due to previous side effects  -Phentermine would be second option but due to history of depression and anxiety not first choice, EKG done while in office today normal sinus rhythm    Orders:    Semaglutide-Weight Management (WEGOVY) 0.25 MG/0.5ML; Inject 0.5 mL (0.25 mg total) under the skin once a week for 28 days    POCT ECG

## 2024-12-09 NOTE — PROGRESS NOTES
Name: Floridalma Tejada      : 1995      MRN: [id number]  Encounter Provider: CORRIE Ma  Encounter Date: 2024   Encounter department: [facility]  :  Assessment & Plan  BMI 33.0-33.9,adult  -Discussed calorie counting and increased exercise  Contraception: OCP  Start wegovy escalation - 0.25mg sent to pharmacy. Discussed dose titration. Use and side effect profile reviewed      - Patient denies personal history of pancreatitis. Patient also denies personal and family history of thyroid cancer and multiple endocrine neoplasia type 2 (MEN 2 tumor).   -follow up in 3 months or sooner if needed   -Wellbutrin not an option due to previous side effects  -Phentermine would be second option but due to history of depression and anxiety not first choice, EKG done while in office today normal sinus rhythm    Orders:    Semaglutide-Weight Management (WEGOVY) 0.25 MG/0.5ML; Inject 0.5 mL (0.25 mg total) under the skin once a week for 28 days    POCT ECG             History of Present Illness     Patient presents today with concerns for heat flashes, struggling to lose weight and low sex drive.  Reviewed blood work while in office today.    CMP unremarkable, TSH unremarkable  Cholesterol 170, triglycerides 108, HDL 63, LDL 85  hCG is negative    She is postpartum 10 months.   She recently stopped breast-feeding    She has been walking and eating well, but she has not been losing weight   She reports that she has been riding her bike more often     Heat flashes at night    She also reports that she is having trouble focusing, completing tasks and lack of concentration    Low sex drive            Review of Systems   Constitutional:  Negative for activity change, appetite change, chills, diaphoresis and fever.   HENT:  Negative for congestion, ear discharge, ear pain, postnasal drip, rhinorrhea, sinus pressure, sinus pain and sore throat.    Eyes:  Negative for pain,  "discharge, itching and visual disturbance.   Respiratory:  Negative for cough, chest tightness, shortness of breath and wheezing.    Cardiovascular:  Negative for chest pain, palpitations and leg swelling.   Gastrointestinal:  Negative for abdominal pain, constipation, diarrhea, nausea and vomiting.   Endocrine: Negative for polydipsia, polyphagia and polyuria.   Genitourinary:  Negative for difficulty urinating, dysuria and urgency.   Musculoskeletal:  Negative for arthralgias, back pain and neck pain.   Skin:  Negative for rash and wound.   Neurological:  Negative for dizziness, weakness, numbness and headaches.        Objective   /84 (BP Location: Left arm, Patient Position: Sitting, Cuff Size: Large)   Pulse 73   Temp 97.8 °F (36.6 °C) (Temporal)   Ht 5' 5\" (1.651 m)   Wt 93.5 kg (206 lb 3.2 oz)   SpO2 97%   BMI 34.31 kg/m²      Physical Exam  Constitutional:       General: She is not in acute distress.     Appearance: She is well-developed. She is not diaphoretic.   HENT:      Head: Normocephalic and atraumatic.      Right Ear: External ear normal.      Left Ear: External ear normal.      Nose: Nose normal.      Mouth/Throat:      Mouth: Mucous membranes are moist.      Pharynx: No oropharyngeal exudate or posterior oropharyngeal erythema.   Eyes:      General:         Right eye: No discharge.         Left eye: No discharge.      Conjunctiva/sclera: Conjunctivae normal.      Pupils: Pupils are equal, round, and reactive to light.   Neck:      Thyroid: No thyromegaly.   Cardiovascular:      Rate and Rhythm: Normal rate and regular rhythm.      Heart sounds: Normal heart sounds. No murmur heard.     No friction rub. No gallop.   Pulmonary:      Effort: Pulmonary effort is normal. No respiratory distress.      Breath sounds: Normal breath sounds. No stridor. No wheezing or rales.   Abdominal:      General: Bowel sounds are normal. There is no distension.      Palpations: Abdomen is soft.      " Tenderness: There is no abdominal tenderness.   Musculoskeletal:      Cervical back: Normal range of motion and neck supple.   Lymphadenopathy:      Cervical: No cervical adenopathy.   Skin:     General: Skin is warm and dry.      Findings: No erythema or rash.   Neurological:      Mental Status: She is alert and oriented to person, place, and time.   Psychiatric:         Behavior: Behavior normal.         Thought Content: Thought content normal.         Judgment: Judgment normal.

## 2024-12-10 ENCOUNTER — TELEPHONE (OUTPATIENT)
Age: 29
End: 2024-12-10

## 2024-12-10 NOTE — TELEPHONE ENCOUNTER
PA for Wegovy 0.25mg SUBMITTED to [employer]    via    []CMM-KEY:   [x]Surescripts-Case ID #: not provided  []Availity-Auth ID #   []Faxed to plan   []Other website   []Phone call Case ID #     []PA sent as URGENT    All office notes, labs and other pertaining documents and studies sent. Clinical questions answered. Awaiting determination from insurance company.     Turnaround time for your insurance to make a decision on your Prior Authorization can take 7-21 business days.

## 2024-12-11 NOTE — TELEPHONE ENCOUNTER
PA for Wegovy 0.25mg EXCLUDED    Reason:(Screenshot if applicable)        Message sent to office clinical pool Yes    Denial letter scanned into Media Yes      **Please follow up with your patient regarding denial and next steps**

## 2024-12-11 NOTE — TELEPHONE ENCOUNTER
Please call notify patient of denial, please find out if any other medications are covered for weight loss.

## 2024-12-13 ENCOUNTER — TELEMEDICINE (OUTPATIENT)
Dept: OTHER | Facility: HOSPITAL | Age: 29
End: 2024-12-13
Payer: COMMERCIAL

## 2024-12-13 DIAGNOSIS — J01.90 ACUTE SINUSITIS, RECURRENCE NOT SPECIFIED, UNSPECIFIED LOCATION: Primary | ICD-10-CM

## 2024-12-13 PROCEDURE — 99213 OFFICE O/P EST LOW 20 MIN: CPT | Performed by: NURSE PRACTITIONER

## 2024-12-13 RX ORDER — AMOXICILLIN 875 MG/1
875 TABLET, COATED ORAL 2 TIMES DAILY
Qty: 20 TABLET | Refills: 0 | Status: SHIPPED | OUTPATIENT
Start: 2024-12-13 | End: 2024-12-23

## 2024-12-13 RX ORDER — FLUTICASONE PROPIONATE 50 MCG
1 SPRAY, SUSPENSION (ML) NASAL DAILY
Qty: 16 G | Refills: 0 | Status: SHIPPED | OUTPATIENT
Start: 2024-12-13 | End: 2024-12-20

## 2024-12-13 NOTE — PATIENT INSTRUCTIONS
"Rest and drink extra fluids.  Start antibiotic.  Take probiotic.  OTC cough and cold as needed.  Flonase can also be helpful.  Nasal saline flushes or wai pot can help flush the sinuses.  Follow up with PCP if no improvement.  Go to ER with any worsening symptoms.     Patient Education     Sinusitis in adults   The Basics   Written by the doctors and editors at [facility]   What is sinusitis? -- Sinusitis is a condition that can cause a stuffy nose, pain in the face, and discharge or \"mucus\" from the nose.  The sinuses are hollow areas in the bones of the face (figure 1). They have a thin lining that normally makes a small amount of mucus. When this lining gets irritated or infected, it swells and makes extra mucus. This causes symptoms.  Sinusitis usually happens after a person gets sick with a cold. The germs causing the cold can infect the sinuses, too. Sometimes, other germs can be the cause of the infection. Often, a person feels like their cold is getting better. But then, they get sinusitis and begin to feel sick again.  What are the symptoms of sinusitis? -- Common symptoms of sinusitis include:   Stuffy or blocked nose   Thick white, yellow, or green discharge from the nose   Pain in the teeth   Pain or pressure in the face - This often feels worse when a person bends forward.  People with sinusitis can also have other symptoms, such as:   Fever   Cough   Trouble smelling   Ear pressure or fullness   Headache   Bad breath   Feeling tired  Most of the time, symptoms start to improve in 7 to 10 days.  Should I see a doctor or nurse? -- See your doctor or nurse if your symptoms last more than 10 days, or if your symptoms first get better but then get worse.  Rarely, sinusitis can lead to serious problems. See your doctor or nurse right away (do not wait 10 days) if you have:   Fever higher than 102°F (38.9°C)   Sudden and severe pain in the face and head   Trouble seeing, or seeing double   Trouble thinking " clearly   Swelling or redness around 1 or both eyes   Stiff neck  Is there anything I can do on my own to feel better? -- Yes. To help with your symptoms, you can:   Take an over-the-counter pain reliever to reduce the pain.   Rinse your nose and sinuses with salt water a few times a day - Ask your doctor or nurse about the best way to do this.   Drink plenty of fluids - Staying hydrated might help to thin the mucus and make it drain more easily.  Your doctor might also recommend a steroid nose spray to reduce the swelling in your nose, especially if you have allergies. Talk to your doctor if you are thinking of using a steroid spray.  How is sinusitis treated? -- Most of the time, sinusitis does not need to be treated with antibiotic medicines. This is because most sinusitis is caused by viruses, not bacteria, and antibiotics do not kill viruses. In fact, even sinusitis caused by bacteria will usually get better on its own without antibiotics.  Some people with sinusitis do need treatment with antibiotics. If your symptoms have not improved after 10 days, ask your doctor if you should take antibiotics. They might recommend that you wait 1 more week to see if your symptoms improve. But if you have symptoms such as a fever or a lot of pain, they might prescribe antibiotics. If you do get antibiotics, follow all of your doctor's instructions about taking them.  What if my symptoms do not get better? -- If your symptoms do not get better, talk with your doctor or nurse. They might order tests to figure out why you still have symptoms. These can include:   CT scan or other imaging tests - Imaging tests create pictures of the inside of the body.   A test to look inside the sinuses - For this test, a doctor puts a thin tube with a camera on the end into the nose and up into the sinuses.  Some people get a lot of sinus infections or have symptoms that last at least 3 months. These people can have a different type of  "sinusitis called \"chronic sinusitis.\" Chronic sinusitis can be caused by different things. For example, some people have growths inside their nose or sinuses that are called \"polyps.\" Other people have allergies that cause their symptoms.  Chronic sinusitis can be treated in different ways. If you have chronic sinusitis, talk with your doctor about which treatments are right for you.  All topics are updated as new evidence becomes available and our peer review process is complete.  This topic retrieved from [employer] on: Feb 28, 2024.  Topic 13328 Version 21.0  Release: 32.2.4 - C32.58  © 2024 UpToDate, Inc. and/or its affiliates. All rights reserved.  figure 1: Sinuses of the face     The sinuses are hollow areas in the bones of the face. This drawing shows where the sinuses are, from the side and front views. There are 4 pairs of sinuses, named for the bones around them: sphenoid, frontal, ethmoid, and maxillary.  Graphic 405707 Version 3.0  Consumer Information Use and Disclaimer   Disclaimer: This generalized information is a limited summary of diagnosis, treatment, and/or medication information. It is not meant to be comprehensive and should be used as a tool to help the user understand and/or assess potential diagnostic and treatment options. It does NOT include all information about conditions, treatments, medications, side effects, or risks that may apply to a specific patient. It is not intended to be medical advice or a substitute for the medical advice, diagnosis, or treatment of a health care provider based on the health care provider's examination and assessment of a patient's specific and unique circumstances. Patients must speak with a health care provider for complete information about their health, medical questions, and treatment options, including any risks or benefits regarding use of medications. This information does not endorse any treatments or medications as safe, effective, or approved for " treating a specific patient. UpToDate, Inc. and its affiliates disclaim any warranty or liability relating to this information or the use thereof.The use of this information is governed by the Terms of Use, available at https://www.wolEarlyDocuwer.com/en/know/clinical-effectiveness-terms. 2024© UpToDate, Inc. and its affiliates and/or licensors. All rights reserved.  Copyright   © 2024 UpToDate, Inc. and/or its affiliates. All rights reserved.

## 2024-12-13 NOTE — PROGRESS NOTES
Virtual Regular Visit  Name: Floridalma Tejada      : 1995      MRN: [id number]  Encounter Provider: CORRIE Chambers  Encounter Date: 2024   Encounter department: VIRTUAL CARE       Verification of patient location:  Patient is located at Home in the following state in which I hold an active license [place] :  Assessment & Plan  Acute sinusitis, recurrence not specified, unspecified location    Orders:    amoxicillin (AMOXIL) 875 mg tablet; Take 1 tablet (875 mg total) by mouth 2 (two) times a day for 10 days    fluticasone (FLONASE) 50 mcg/act nasal spray; 1 spray into each nostril daily for 7 days        Encounter provider CORRIE Chambers    The patient was identified by name and date of birth. Floridalma Tejada was informed that this is a telemedicine visit and that the visit is being conducted through the Epic Embedded platform. She agrees to proceed..  My office door was closed. No one else was in the room.  She acknowledged consent and understanding of privacy and security of the video platform. The patient has agreed to participate and understands they can discontinue the visit at any time.    Patient is aware this is a billable service.     History was obtained from: History obtained from: patient  History of Present Illness     This is a 29 year old female here today for video visit.  She states she has had congestion.  She states yesterday it worsened in her sinuses and felt like her ears were clogged.  She had some bloody drainage from her ear.  She saw this on a qtip.  She states the congestion has been for about about 1 week.  She states clogged ear and pain since last night. No fevers.  She states she has a mild cough.  She did not test covid.  No chest pain or SOB.        Review of Systems   Constitutional:  Negative for activity change, chills, fatigue and fever.   HENT:  Positive for congestion, rhinorrhea, sinus pressure and sinus pain.    Respiratory:  Positive for cough.     Cardiovascular: Negative.    Neurological: Negative.    Psychiatric/Behavioral: Negative.         Objective   There were no vitals taken for this visit.    Physical Exam  Constitutional:       General: She is not in acute distress.     Appearance: Normal appearance. She is not ill-appearing or toxic-appearing.   HENT:      Head: Normocephalic and atraumatic.      Nose: Congestion and rhinorrhea present.   Pulmonary:      Effort: Pulmonary effort is normal. No respiratory distress.   Neurological:      Mental Status: She is alert and oriented to person, place, and time.   Psychiatric:         Mood and Affect: Mood normal.         Behavior: Behavior normal.         Thought Content: Thought content normal.         Judgment: Judgment normal.         Visit Time  Total Visit Duration: 6 minutes not including the time spent for establishing the audio/video connection.

## 2025-01-13 ENCOUNTER — TELEPHONE (OUTPATIENT)
Dept: OBGYN CLINIC | Facility: CLINIC | Age: 30
End: 2025-01-13

## 2025-01-13 NOTE — TELEPHONE ENCOUNTER
Patient asked if she should still be taking current bc pills being that there are no sugar pills     She is no longer breast feeding - needs to know if she should switch pills

## 2025-01-13 NOTE — TELEPHONE ENCOUNTER
Should continue birth control pill and take every day. Can plan to discuss alternate methods at upcoming annual or schedule for VV if desires change sooner.

## 2025-01-13 NOTE — TELEPHONE ENCOUNTER
Spoke with patient to review recommendations per Michelle BECKMAN. Will continue pill and discuss at annual appointment in March.

## 2025-01-15 DIAGNOSIS — N94.89 SUPPRESSION OF MENSES: Primary | ICD-10-CM

## 2025-01-15 RX ORDER — MEDROXYPROGESTERONE ACETATE 10 MG
10 TABLET ORAL DAILY
Qty: 10 TABLET | Refills: 0 | Status: SHIPPED | OUTPATIENT
Start: 2025-01-15

## 2025-01-29 ENCOUNTER — TELEPHONE (OUTPATIENT)
Age: 30
End: 2025-01-29

## 2025-02-10 ENCOUNTER — TELEPHONE (OUTPATIENT)
Age: 30
End: 2025-02-10

## 2025-02-10 NOTE — TELEPHONE ENCOUNTER
Patient called stating her pharmacy has not filled her prescription for Semaglutide-Weight Management (WEGOVY) 0.25 MG/0.5ML due to not having any prior auth information. She mentioned she called her insurance and was instructed to reach out to the prescriber. Please advise to further assist.

## 2025-02-11 NOTE — TELEPHONE ENCOUNTER
PA for Wegovy 0.25MG/0.5ML auto-injectors SUBMITTED to [employer]    via    [x]CMM-KEY: [id number]  []Surescripts-Case ID #   []Availity-Auth ID # NDC #   []Faxed to plan   []Other website   []Phone call Case ID #     [x]PA sent as URGENT    All office notes, labs and other pertaining documents and studies sent. Clinical questions answered. Awaiting determination from insurance company.     Turnaround time for your insurance to make a decision on your Prior Authorization can take 7-21 business days.

## 2025-02-17 NOTE — TELEPHONE ENCOUNTER
PA for Wegovy 0.25MG/0.5ML auto-injectors EXCLUDED from plan       Reason:(Screenshot if applicable)        Message sent to office clinical pool Yes

## 2025-03-09 DIAGNOSIS — F41.8 DEPRESSION WITH ANXIETY: ICD-10-CM

## 2025-03-10 RX ORDER — SERTRALINE HYDROCHLORIDE 100 MG/1
100 TABLET, FILM COATED ORAL DAILY
Qty: 90 TABLET | Refills: 1 | Status: SHIPPED | OUTPATIENT
Start: 2025-03-10

## 2025-03-11 ENCOUNTER — OFFICE VISIT (OUTPATIENT)
Age: 30
End: 2025-03-11
Payer: COMMERCIAL

## 2025-03-11 VITALS
HEART RATE: 78 BPM | HEIGHT: 66 IN | WEIGHT: 189.9 LBS | BODY MASS INDEX: 30.52 KG/M2 | SYSTOLIC BLOOD PRESSURE: 110 MMHG | OXYGEN SATURATION: 99 % | DIASTOLIC BLOOD PRESSURE: 80 MMHG | TEMPERATURE: 98.4 F

## 2025-03-11 DIAGNOSIS — F41.9 ANXIETY: Primary | ICD-10-CM

## 2025-03-11 DIAGNOSIS — F32.2 SEVERE MAJOR DEPRESSIVE DISORDER (HCC): ICD-10-CM

## 2025-03-11 PROCEDURE — 99213 OFFICE O/P EST LOW 20 MIN: CPT | Performed by: NURSE PRACTITIONER

## 2025-03-11 RX ORDER — PROPRANOLOL HCL 20 MG
40 TABLET ORAL DAILY PRN
Qty: 30 TABLET | Refills: 0 | Status: SHIPPED | OUTPATIENT
Start: 2025-03-11 | End: 2025-03-21

## 2025-03-11 NOTE — PROGRESS NOTES
Name: Floridalma Tejada      : 1995      MRN: [id number]  Encounter Provider: CORRIE Ma  Encounter Date: 3/11/2025   Encounter department: [facility]  :  Assessment & Plan  Anxiety  -Will start propranolol 40 mg as needed for anxiety    Orders:    propranolol (INDERAL) 20 mg tablet; Take 2 tablets (40 mg total) by mouth daily as needed (anxiety)    Severe major depressive disorder (HCC)  -Well-controlled on Zoloft                  History of Present Illness   Patient presents today with concerns for a nervous twitch of yawning.   She reports that it is becoming more excessive.   Feels like it gets worse when she is nervous or uncomfortable situations.  She reports that she has had this for years   Happens with job interviews or when meeting new people     She continues to follow with her therapist       Review of Systems   Constitutional:  Negative for activity change, appetite change, chills, diaphoresis and fever.   HENT:  Negative for congestion, ear discharge, ear pain, postnasal drip, rhinorrhea, sinus pressure, sinus pain and sore throat.    Eyes:  Negative for pain, discharge, itching and visual disturbance.   Respiratory:  Negative for cough, chest tightness, shortness of breath and wheezing.    Cardiovascular:  Negative for chest pain, palpitations and leg swelling.   Gastrointestinal:  Negative for abdominal pain, constipation, diarrhea, nausea and vomiting.   Endocrine: Negative for polydipsia, polyphagia and polyuria.   Genitourinary:  Negative for difficulty urinating, dysuria and urgency.   Musculoskeletal:  Negative for arthralgias, back pain and neck pain.   Skin:  Negative for rash and wound.   Neurological:  Negative for dizziness, weakness, numbness and headaches.   Psychiatric/Behavioral:  Negative for dysphoric mood. The patient is nervous/anxious.        Objective   /80 (BP Location: Left arm, Patient Position: Sitting, Cuff  "Size: Standard)   Pulse 78   Temp 98.4 °F (36.9 °C) (Temporal)   Ht 5' 6.1\" (1.679 m)   Wt 86.1 kg (189 lb 14.4 oz)   SpO2 99%   BMI 30.56 kg/m²      Physical Exam  Constitutional:       General: She is not in acute distress.     Appearance: She is well-developed. She is not diaphoretic.   HENT:      Head: Normocephalic and atraumatic.      Right Ear: External ear normal.      Left Ear: External ear normal.      Nose: Nose normal.      Mouth/Throat:      Mouth: Mucous membranes are moist.      Pharynx: No oropharyngeal exudate or posterior oropharyngeal erythema.   Eyes:      General:         Right eye: No discharge.         Left eye: No discharge.      Conjunctiva/sclera: Conjunctivae normal.      Pupils: Pupils are equal, round, and reactive to light.   Neck:      Thyroid: No thyromegaly.   Cardiovascular:      Rate and Rhythm: Normal rate and regular rhythm.      Heart sounds: Normal heart sounds. No murmur heard.     No friction rub. No gallop.   Pulmonary:      Effort: Pulmonary effort is normal. No respiratory distress.      Breath sounds: Normal breath sounds. No stridor. No wheezing or rales.   Abdominal:      General: Bowel sounds are normal. There is no distension.      Palpations: Abdomen is soft.      Tenderness: There is no abdominal tenderness.   Musculoskeletal:      Cervical back: Normal range of motion and neck supple.   Lymphadenopathy:      Cervical: No cervical adenopathy.   Skin:     General: Skin is warm and dry.      Findings: No erythema or rash.   Neurological:      Mental Status: She is alert and oriented to person, place, and time.   Psychiatric:         Behavior: Behavior normal.         Thought Content: Thought content normal.         Judgment: Judgment normal.       "

## 2025-03-11 NOTE — ASSESSMENT & PLAN NOTE
-Will start propranolol 40 mg as needed for anxiety    Orders:    propranolol (INDERAL) 20 mg tablet; Take 2 tablets (40 mg total) by mouth daily as needed (anxiety)

## 2025-03-12 ENCOUNTER — NURSE TRIAGE (OUTPATIENT)
Age: 30
End: 2025-03-12

## 2025-03-12 NOTE — TELEPHONE ENCOUNTER
Agree with plan. Very likely from missed pill. Can plan to f/u at appt next week but sooner with any changes or concerns.

## 2025-03-12 NOTE — TELEPHONE ENCOUNTER
"FOLLOW UP: Injose let sent to Michelle Andrew. Annual appointment 3/19    REASON FOR CONVERSATION: Vaginal Bleeding    SYMPTOMS: vaginal bleeding started yesterday, darker color than normal with stronger cramping and small dime size clots @ times. Not soaking through pads. Not lightheaded, not dizzy.     OTHER: on BCP ([employer])  missed 1 pill 2 weeks ago. Today bleeding is less but still has moderate cramping. Using Tylenol for pain. Patient states she will check pregnancy test. Stopped nursing in Nov, had 1st period Dec. Periods have been not been very regular.  Reviewed Ibuprofen and warm moist heat. Discussed bleeding precautions and advised when to seek Uc/ED care. Patient will call back if pregnancy test +. Patient verbalzied understanding , no questions.    DISPOSITION: Discuss with Provider and Call Back Patient  Answer Assessment - Initial Assessment Questions  1. BLEEDING SEVERITY: \"Describe the bleeding that you are having.\" \"How much bleeding is there?\"       Bleeding is lighter flow amount but color darker and passing small dimes at times  2. ONSET: \"When did the bleeding begin?\" \"Is it continuing now?\"      Yesterday started bleeding and cramping   3. MENSTRUAL PERIOD: \"When was the last normal menstrual period?\" \"How is this different than your period?\"      LMP 2/25/25  4. REGULARITY: \"How regular are your periods?\"      BCP since 4 months post partum, periods only started in Dec   5. ABDOMEN PAIN: \"Do you have any pain?\" \"How bad is the pain?\"  (e.g., Scale 0-10; none, mild, moderate, or severe)      Cramping today is intermittent in intensity,using Tylenol   6. PREGNANCY: \"Is there any chance you are pregnant?\" \"When was your last menstrual period?\"      On BCP   7. BREASTFEEDING: \"Are you breastfeeding?\"      Stopped breastfeeding  in Nov   8. HORMONE MEDICINES: \"Are you taking any hormone medicines, prescription or over-the-counter?\" (e.g., birth control pills, estrogen)      BCP   9. BLOOD THINNER " "MEDICINES: \"Do you take any blood thinners?\" (e.g., Coumadin / warfarin, Pradaxa / dabigatran, aspirin)      Denies   10. CAUSE: \"What do you think is causing the bleeding?\" (e.g., recent gyn surgery, recent gyn procedure; known bleeding disorder, cervical cancer, polycystic ovarian disease, fibroids)          Unsure, she will take a pregnancy   11. HEMODYNAMIC STATUS: \"Are you weak or feeling lightheaded?\" If Yes, ask: \"Can you stand and walk normally?\"         Denies  12. OTHER SYMPTOMS: \"What other symptoms are you having with the bleeding?\" (e.g., passed tissue, vaginal discharge, fever, menstrual-type cramps)        LMP 2/25/25 4 days of bleeding    Protocols used: Vaginal Bleeding - Abnormal-Adult-OH    "

## 2025-03-18 DIAGNOSIS — F41.9 ANXIETY: ICD-10-CM

## 2025-03-19 ENCOUNTER — ANNUAL EXAM (OUTPATIENT)
Dept: OBGYN CLINIC | Facility: CLINIC | Age: 30
End: 2025-03-19
Payer: COMMERCIAL

## 2025-03-19 VITALS
HEIGHT: 66 IN | WEIGHT: 187.4 LBS | BODY MASS INDEX: 30.12 KG/M2 | DIASTOLIC BLOOD PRESSURE: 80 MMHG | SYSTOLIC BLOOD PRESSURE: 108 MMHG

## 2025-03-19 DIAGNOSIS — Z30.011 INITIATION OF ORAL CONTRACEPTION: ICD-10-CM

## 2025-03-19 DIAGNOSIS — Z01.419 ENCOUNTER FOR ANNUAL ROUTINE GYNECOLOGICAL EXAMINATION: Primary | ICD-10-CM

## 2025-03-19 DIAGNOSIS — Z12.4 SCREENING FOR CERVICAL CANCER: ICD-10-CM

## 2025-03-19 DIAGNOSIS — N94.10 DYSPAREUNIA IN FEMALE: ICD-10-CM

## 2025-03-19 DIAGNOSIS — Z83.49: ICD-10-CM

## 2025-03-19 PROCEDURE — G0145 SCR C/V CYTO,THINLAYER,RESCR: HCPCS | Performed by: PHYSICIAN ASSISTANT

## 2025-03-19 PROCEDURE — S0612 ANNUAL GYNECOLOGICAL EXAMINA: HCPCS | Performed by: PHYSICIAN ASSISTANT

## 2025-03-19 RX ORDER — NORGESTREL-ETHINYL ESTRADIOL 0.3-0.03MG
1 TABLET ORAL DAILY
Qty: 90 TABLET | Refills: 4 | Status: SHIPPED | OUTPATIENT
Start: 2025-03-19

## 2025-03-19 NOTE — PROGRESS NOTES
Name: Floridalma Tejada      : 1995      MRN: [id number]  Encounter Provider: Michelle Andrew PA-C  Encounter Date: 3/19/2025   Encounter department: [facility]  :  Assessment & Plan  Encounter for annual routine gynecological examination  Cervical cancer screening:  Normal cervical exam. Pap collected today  STD screening:  Patient declines.   Breast cancer screening:  Normal breast exam. Reviewed breast self-awareness. Mammo due @ 42  Depression Screening: Patient's depression screening was assessed with a PHQ-2 score of 0.   BMI Counseling: Body mass index is 30.48 kg/m². Discussed diet and exercise recommendations.  Contraception:  POP change to COCP.  Return to office 1 year for annual exam, sooner PRN / otherwise directed .         Screening for cervical cancer    Orders:  •  Liquid-based pap, screening    Family history of primary ovarian failure    Orders:  •  Antimullerian hormone (AMH); Future    Initiation of oral contraception  Change from POP to COCP. Previously on cryselle. Denies contraindications to estrogen. Rx sent. Use, s/e, time to efficacy reviewed. She will see PCP within the next 3 months for annual exam. Will have BP reviewed at that time. She is aware to call office if elevated. Otherwise will plan to call in with any concerns/complaints.     Orders:  •  norgestrel-ethinyl estradiol (Cryselle-28) 0.3 mg-30 mcg per tablet; Take 1 tablet by mouth daily    Dyspareunia in female  Continues with dyspareunia. Declines STD testing. No pain, reproducibility on exam.  Recommended pelvic US to evaluate for structural cause. Reviewed benefit of PFPT. Will consider.     Orders:  •  US pelvis complete w transvaginal; Future  •  Ambulatory referral to Physical Therapy; Future        History of Present Illness   Floridalma Tejada is a 29 y.o.  presenting for annual exam. She is without complaint.    Doing well. Her daughter Junior is nearly 1.5  and is thriving. She and  are thinking about more children. Floridalma didn't enjoy pregnancy and would be content with one. Her  would like another and feels they need to do this sooner rather than later due to her FH of POF and low AMH. Requests to repeat today.     Last Pap: 03/21/2022 NILM . Due today     Periods are regular every 4 weeks on OCP.  Lasting 4 days.  Dysmenorrhea:moderate, occurring premenstrually and first 1-2 days of flow.   Cyclic symptoms - mild.   Would like to change back to COCP as she was less symptomatic on this and enjoyed being able to skip period when needed. She stopped breast feeding 4 months ago.   Denies contraindications to estrogen as reviewed.     Sexually active: with her   Concerns: denies bleeding, dryness. Continues with dyspareunia. Feels deep, most prominent with first penetration but does not always improve. Doesn't seem positional.   Contraception: Micronor      Hx Abnormal pap: no  We reviewed ASCCP guidelines for Pap testing today.  Gardasil: She has completed the Gardasil series.      Review of Systems   Constitutional: Negative.         No breast concerns.   Gastrointestinal: Negative.    Genitourinary:  Negative for difficulty urinating, dysuria, frequency, pelvic pain, urgency, vaginal bleeding and vaginal discharge.   Skin: Negative.    Neurological:  Negative for headaches.   Psychiatric/Behavioral: Negative.       Medical History Reviewed by provider this encounter:     .  Current Outpatient Medications on File Prior to Visit   Medication Sig Dispense Refill   • NON FORMULARY Take 10 mg by mouth in the morning biotin     • propranolol (INDERAL) 20 mg tablet Take 2 tablets (40 mg total) by mouth daily as needed (anxiety) 30 tablet 0   • sertraline (ZOLOFT) 100 mg tablet TAKE 1 TABLET BY MOUTH EVERY DAY 90 tablet 1   • [DISCONTINUED] norethindrone (MICRONOR) 0.35 MG tablet Take 1 tablet (0.35 mg total) by mouth daily 90 tablet 4   • fluticasone  "(FLONASE) 50 mcg/act nasal spray 1 spray into each nostril daily for 7 days 16 g 0   • Prenatal Vit-Fe Fumarate-FA (PRENATAL VITAMIN PO) Take 1 tablet by mouth in the morning (Patient not taking: Reported on 12/9/2024)     • [DISCONTINUED] medroxyPROGESTERone (PROVERA) 10 mg tablet Take 1 tablet (10 mg total) by mouth daily (Patient not taking: Reported on 3/11/2025) 10 tablet 0     No current facility-administered medications on file prior to visit.      Social History     Tobacco Use   • Smoking status: Never   • Smokeless tobacco: Never   Vaping Use   • Vaping status: Never Used   Substance and Sexual Activity   • Alcohol use: Yes     Alcohol/week: 3.0 standard drinks of alcohol     Types: 3 Glasses of wine per week   • Drug use: No   • Sexual activity: Yes     Partners: Male     Birth control/protection: OCP        Objective   /80 (BP Location: Right arm, Patient Position: Sitting, Cuff Size: Standard)   Ht 5' 5.75\" (1.67 m)   Wt 85 kg (187 lb 6.4 oz)   LMP 02/28/2025   Breastfeeding No   BMI 30.48 kg/m²      Physical Exam  Vitals and nursing note reviewed.   Constitutional:       General: She is not in acute distress.     Appearance: Normal appearance.   HENT:      Head: Normocephalic and atraumatic.   Eyes:      Conjunctiva/sclera: Conjunctivae normal.   Pulmonary:      Effort: Pulmonary effort is normal.   Chest:   Breasts:     Breasts are symmetrical.      Right: Normal.      Left: Normal.   Abdominal:      General: There is no distension.      Palpations: Abdomen is soft.      Tenderness: There is no abdominal tenderness.   Genitourinary:     General: Normal vulva.      Pubic Area: No rash.       Labia:         Right: No rash or lesion.         Left: No rash or lesion.       Vagina: Normal.      Cervix: Normal. Eversion present.      Uterus: Normal.       Adnexa: Right adnexa normal and left adnexa normal.   Lymphadenopathy:      Upper Body:      Right upper body: No supraclavicular or axillary " adenopathy.      Left upper body: No supraclavicular or axillary adenopathy.      Lower Body: No right inguinal adenopathy. No left inguinal adenopathy.   Skin:     General: Skin is warm and dry.   Neurological:      General: No focal deficit present.      Mental Status: She is alert.      Cranial Nerves: No cranial nerve deficit.   Psychiatric:         Mood and Affect: Mood normal.         Behavior: Behavior normal.

## 2025-03-21 RX ORDER — PROPRANOLOL HCL 20 MG
TABLET ORAL
Qty: 180 TABLET | Refills: 1 | Status: SHIPPED | OUTPATIENT
Start: 2025-03-21

## 2025-03-24 ENCOUNTER — RESULTS FOLLOW-UP (OUTPATIENT)
Dept: OBGYN CLINIC | Facility: CLINIC | Age: 30
End: 2025-03-24

## 2025-03-24 LAB
LAB AP GYN PRIMARY INTERPRETATION: NORMAL
Lab: NORMAL

## 2025-03-26 ENCOUNTER — APPOINTMENT (OUTPATIENT)
Dept: LAB | Age: 30
End: 2025-03-26
Payer: COMMERCIAL

## 2025-03-26 DIAGNOSIS — Z83.49: ICD-10-CM

## 2025-03-26 PROCEDURE — 82166 ASSAY ANTI-MULLERIAN HORM: CPT

## 2025-04-05 LAB — MIS SERPL-MCNC: 0.73 NG/ML

## 2025-04-19 ENCOUNTER — HOSPITAL ENCOUNTER (OUTPATIENT)
Dept: ULTRASOUND IMAGING | Facility: HOSPITAL | Age: 30
Discharge: HOME | End: 2025-04-19
Payer: COMMERCIAL

## 2025-04-19 DIAGNOSIS — N94.10 DYSPAREUNIA IN FEMALE: ICD-10-CM

## 2025-04-19 PROCEDURE — 76856 US EXAM PELVIC COMPLETE: CPT

## 2025-04-19 PROCEDURE — 76830 TRANSVAGINAL US NON-OB: CPT

## 2025-04-25 ENCOUNTER — TELEPHONE (OUTPATIENT)
Dept: INTERNAL MEDICINE CLINIC | Facility: OTHER | Age: 30
End: 2025-04-25

## 2025-04-25 NOTE — TELEPHONE ENCOUNTER
Received a [employer] virtual card in the mail from [employer] for an amount of $45.92.  Credit amount was put towards patients 3/11/25 billed visit

## 2025-06-16 DIAGNOSIS — Z30.011 INITIATION OF ORAL CONTRACEPTION: ICD-10-CM

## 2025-06-16 RX ORDER — NORGESTREL-ETHINYL ESTRADIOL 0.3-0.03MG
1 TABLET ORAL DAILY
Qty: 90 TABLET | Refills: 1 | Status: SHIPPED | OUTPATIENT
Start: 2025-06-16

## 2025-08-22 ENCOUNTER — OFFICE VISIT (OUTPATIENT)
Age: 30
End: 2025-08-22
Payer: COMMERCIAL

## 2025-08-22 VITALS
SYSTOLIC BLOOD PRESSURE: 120 MMHG | WEIGHT: 172 LBS | DIASTOLIC BLOOD PRESSURE: 80 MMHG | OXYGEN SATURATION: 100 % | HEART RATE: 81 BPM | HEIGHT: 66 IN | BODY MASS INDEX: 27.64 KG/M2 | TEMPERATURE: 98 F

## 2025-08-22 DIAGNOSIS — Z13.228 SCREENING FOR METABOLIC DISORDER: ICD-10-CM

## 2025-08-22 DIAGNOSIS — F41.8 DEPRESSION WITH ANXIETY: ICD-10-CM

## 2025-08-22 DIAGNOSIS — E78.2 MIXED HYPERLIPIDEMIA: ICD-10-CM

## 2025-08-22 DIAGNOSIS — E03.9 HYPOTHYROIDISM, UNSPECIFIED TYPE: ICD-10-CM

## 2025-08-22 DIAGNOSIS — E61.1 IRON DEFICIENCY: ICD-10-CM

## 2025-08-22 DIAGNOSIS — F41.9 ANXIETY: Primary | ICD-10-CM

## 2025-08-22 DIAGNOSIS — R79.89 ABNORMAL TSH: ICD-10-CM

## 2025-08-22 PROCEDURE — 99213 OFFICE O/P EST LOW 20 MIN: CPT | Performed by: NURSE PRACTITIONER

## 2025-08-22 RX ORDER — HYDROXYZINE HYDROCHLORIDE 25 MG/1
25 TABLET, FILM COATED ORAL
Qty: 30 TABLET | Refills: 0 | Status: SHIPPED | OUTPATIENT
Start: 2025-08-22

## 2025-08-22 RX ORDER — BUSPIRONE HYDROCHLORIDE 10 MG/1
10 TABLET ORAL 2 TIMES DAILY
Qty: 120 TABLET | Refills: 0 | Status: SHIPPED | OUTPATIENT
Start: 2025-08-22